# Patient Record
Sex: FEMALE | Race: WHITE | NOT HISPANIC OR LATINO | Employment: OTHER | URBAN - METROPOLITAN AREA
[De-identification: names, ages, dates, MRNs, and addresses within clinical notes are randomized per-mention and may not be internally consistent; named-entity substitution may affect disease eponyms.]

---

## 2019-06-26 ENCOUNTER — TRANSFERRED RECORDS (OUTPATIENT)
Dept: HEALTH INFORMATION MANAGEMENT | Facility: CLINIC | Age: 74
End: 2019-06-26

## 2019-06-26 LAB
CREAT SERPL-MCNC: 0.7 MG/DL (ref 0.6–1.3)
GLUCOSE SERPL-MCNC: 92 MG/DL (ref 70–99)
POTASSIUM SERPL-SCNC: 4.7 MMOL/L (ref 3.5–5.1)

## 2019-07-09 ENCOUNTER — TRANSFERRED RECORDS (OUTPATIENT)
Dept: HEALTH INFORMATION MANAGEMENT | Facility: CLINIC | Age: 74
End: 2019-07-09

## 2019-07-17 ENCOUNTER — HOSPITAL ENCOUNTER (INPATIENT)
Facility: CLINIC | Age: 74
LOS: 1 days | Discharge: HOME HEALTH | DRG: 312 | End: 2019-07-20
Attending: EMERGENCY MEDICINE | Admitting: INTERNAL MEDICINE
Payer: MEDICARE

## 2019-07-17 ENCOUNTER — APPOINTMENT (OUTPATIENT)
Dept: GENERAL RADIOLOGY | Facility: CLINIC | Age: 74
DRG: 312 | End: 2019-07-17
Attending: EMERGENCY MEDICINE
Payer: MEDICARE

## 2019-07-17 DIAGNOSIS — R52 PAIN: ICD-10-CM

## 2019-07-17 DIAGNOSIS — R53.1 GENERALIZED WEAKNESS: Primary | ICD-10-CM

## 2019-07-17 DIAGNOSIS — I95.1 ORTHOSTATIC HYPOTENSION: ICD-10-CM

## 2019-07-17 DIAGNOSIS — R26.81 UNSTEADY GAIT: ICD-10-CM

## 2019-07-17 DIAGNOSIS — R45.87 IMPULSIVE: ICD-10-CM

## 2019-07-17 DIAGNOSIS — M62.81 GENERALIZED MUSCLE WEAKNESS: ICD-10-CM

## 2019-07-17 LAB
ALBUMIN UR-MCNC: NEGATIVE MG/DL
AMORPH CRY #/AREA URNS HPF: ABNORMAL /HPF
ANION GAP SERPL CALCULATED.3IONS-SCNC: 6 MMOL/L (ref 3–14)
APPEARANCE UR: ABNORMAL
BACTERIA #/AREA URNS HPF: ABNORMAL /HPF
BASOPHILS # BLD AUTO: 0.1 10E9/L (ref 0–0.2)
BASOPHILS NFR BLD AUTO: 1.1 %
BILIRUB UR QL STRIP: NEGATIVE
BUN SERPL-MCNC: 17 MG/DL (ref 7–30)
CALCIUM SERPL-MCNC: 9.4 MG/DL (ref 8.5–10.1)
CHLORIDE SERPL-SCNC: 94 MMOL/L (ref 94–109)
CK SERPL-CCNC: 111 U/L (ref 30–225)
CO2 SERPL-SCNC: 27 MMOL/L (ref 20–32)
COLOR UR AUTO: ABNORMAL
CREAT SERPL-MCNC: 0.54 MG/DL (ref 0.52–1.04)
DIFFERENTIAL METHOD BLD: ABNORMAL
EOSINOPHIL # BLD AUTO: 0.2 10E9/L (ref 0–0.7)
EOSINOPHIL NFR BLD AUTO: 1.9 %
ERYTHROCYTE [DISTWIDTH] IN BLOOD BY AUTOMATED COUNT: 12.6 % (ref 10–15)
GFR SERPL CREATININE-BSD FRML MDRD: >90 ML/MIN/{1.73_M2}
GLUCOSE SERPL-MCNC: 111 MG/DL (ref 70–99)
GLUCOSE UR STRIP-MCNC: NEGATIVE MG/DL
HCT VFR BLD AUTO: 35.6 % (ref 35–47)
HGB BLD-MCNC: 12.1 G/DL (ref 11.7–15.7)
HGB UR QL STRIP: NEGATIVE
IMM GRANULOCYTES # BLD: 0.1 10E9/L (ref 0–0.4)
IMM GRANULOCYTES NFR BLD: 0.8 %
KETONES UR STRIP-MCNC: NEGATIVE MG/DL
LEUKOCYTE ESTERASE UR QL STRIP: NEGATIVE
LYMPHOCYTES # BLD AUTO: 2.6 10E9/L (ref 0.8–5.3)
LYMPHOCYTES NFR BLD AUTO: 32.6 %
MCH RBC QN AUTO: 31.7 PG (ref 26.5–33)
MCHC RBC AUTO-ENTMCNC: 34 G/DL (ref 31.5–36.5)
MCV RBC AUTO: 93 FL (ref 78–100)
MONOCYTES # BLD AUTO: 0.9 10E9/L (ref 0–1.3)
MONOCYTES NFR BLD AUTO: 11.5 %
MUCOUS THREADS #/AREA URNS LPF: PRESENT /LPF
NEUTROPHILS # BLD AUTO: 4.1 10E9/L (ref 1.6–8.3)
NEUTROPHILS NFR BLD AUTO: 52.1 %
NITRATE UR QL: NEGATIVE
NRBC # BLD AUTO: 0 10*3/UL
NRBC BLD AUTO-RTO: 0 /100
PH UR STRIP: 7 PH (ref 5–7)
PLATELET # BLD AUTO: 608 10E9/L (ref 150–450)
POTASSIUM SERPL-SCNC: 4.3 MMOL/L (ref 3.4–5.3)
RBC # BLD AUTO: 3.82 10E12/L (ref 3.8–5.2)
RBC #/AREA URNS AUTO: 2 /HPF (ref 0–2)
SODIUM SERPL-SCNC: 127 MMOL/L (ref 133–144)
SOURCE: ABNORMAL
SP GR UR STRIP: 1.01 (ref 1–1.03)
SQUAMOUS #/AREA URNS AUTO: 3 /HPF (ref 0–1)
TROPONIN I SERPL-MCNC: <0.015 UG/L (ref 0–0.04)
TSH SERPL DL<=0.005 MIU/L-ACNC: 1.16 MU/L (ref 0.4–4)
UROBILINOGEN UR STRIP-MCNC: NORMAL MG/DL (ref 0–2)
WBC # BLD AUTO: 7.9 10E9/L (ref 4–11)
WBC #/AREA URNS AUTO: 3 /HPF (ref 0–5)

## 2019-07-17 PROCEDURE — 93005 ELECTROCARDIOGRAM TRACING: CPT

## 2019-07-17 PROCEDURE — 82550 ASSAY OF CK (CPK): CPT | Performed by: EMERGENCY MEDICINE

## 2019-07-17 PROCEDURE — 84484 ASSAY OF TROPONIN QUANT: CPT | Performed by: EMERGENCY MEDICINE

## 2019-07-17 PROCEDURE — 99285 EMERGENCY DEPT VISIT HI MDM: CPT | Mod: 25

## 2019-07-17 PROCEDURE — 84443 ASSAY THYROID STIM HORMONE: CPT | Performed by: EMERGENCY MEDICINE

## 2019-07-17 PROCEDURE — 96361 HYDRATE IV INFUSION ADD-ON: CPT

## 2019-07-17 PROCEDURE — 87086 URINE CULTURE/COLONY COUNT: CPT | Performed by: EMERGENCY MEDICINE

## 2019-07-17 PROCEDURE — 25800030 ZZH RX IP 258 OP 636: Performed by: PHYSICIAN ASSISTANT

## 2019-07-17 PROCEDURE — G0378 HOSPITAL OBSERVATION PER HR: HCPCS

## 2019-07-17 PROCEDURE — 80048 BASIC METABOLIC PNL TOTAL CA: CPT | Performed by: EMERGENCY MEDICINE

## 2019-07-17 PROCEDURE — 99222 1ST HOSP IP/OBS MODERATE 55: CPT | Mod: AI | Performed by: PHYSICIAN ASSISTANT

## 2019-07-17 PROCEDURE — 96360 HYDRATION IV INFUSION INIT: CPT

## 2019-07-17 PROCEDURE — 25000132 ZZH RX MED GY IP 250 OP 250 PS 637: Mod: GY | Performed by: PHYSICIAN ASSISTANT

## 2019-07-17 PROCEDURE — 25000132 ZZH RX MED GY IP 250 OP 250 PS 637: Mod: GY | Performed by: EMERGENCY MEDICINE

## 2019-07-17 PROCEDURE — 85025 COMPLETE CBC W/AUTO DIFF WBC: CPT | Performed by: EMERGENCY MEDICINE

## 2019-07-17 PROCEDURE — 71046 X-RAY EXAM CHEST 2 VIEWS: CPT

## 2019-07-17 PROCEDURE — 81001 URINALYSIS AUTO W/SCOPE: CPT | Performed by: EMERGENCY MEDICINE

## 2019-07-17 RX ORDER — TRAMADOL HYDROCHLORIDE 50 MG/1
50 TABLET ORAL EVERY 6 HOURS PRN
COMMUNITY
End: 2019-07-17

## 2019-07-17 RX ORDER — DOXYCYCLINE HYCLATE 100 MG/1
TABLET, DELAYED RELEASE ORAL
COMMUNITY
End: 2019-07-17

## 2019-07-17 RX ORDER — POLYETHYLENE GLYCOL 3350 17 G/17G
17 POWDER, FOR SOLUTION ORAL DAILY PRN
Status: DISCONTINUED | OUTPATIENT
Start: 2019-07-17 | End: 2019-07-17

## 2019-07-17 RX ORDER — AMOXICILLIN 250 MG
2 CAPSULE ORAL 2 TIMES DAILY PRN
Status: DISCONTINUED | OUTPATIENT
Start: 2019-07-17 | End: 2019-07-20 | Stop reason: HOSPADM

## 2019-07-17 RX ORDER — ACETAMINOPHEN 325 MG/1
975 TABLET ORAL ONCE
Status: COMPLETED | OUTPATIENT
Start: 2019-07-17 | End: 2019-07-17

## 2019-07-17 RX ORDER — ACETAMINOPHEN 325 MG/1
650 TABLET ORAL EVERY 4 HOURS PRN
Status: DISCONTINUED | OUTPATIENT
Start: 2019-07-17 | End: 2019-07-20 | Stop reason: HOSPADM

## 2019-07-17 RX ORDER — POLYETHYLENE GLYCOL 3350 17 G/17G
17 POWDER, FOR SOLUTION ORAL DAILY
Status: DISCONTINUED | OUTPATIENT
Start: 2019-07-17 | End: 2019-07-20 | Stop reason: HOSPADM

## 2019-07-17 RX ORDER — ONDANSETRON 4 MG/1
4 TABLET, ORALLY DISINTEGRATING ORAL EVERY 6 HOURS PRN
Status: DISCONTINUED | OUTPATIENT
Start: 2019-07-17 | End: 2019-07-20 | Stop reason: HOSPADM

## 2019-07-17 RX ORDER — DOXYCYCLINE 100 MG/1
100 CAPSULE ORAL 2 TIMES DAILY
Status: DISCONTINUED | OUTPATIENT
Start: 2019-07-17 | End: 2019-07-19

## 2019-07-17 RX ORDER — NALOXONE HYDROCHLORIDE 0.4 MG/ML
.1-.4 INJECTION, SOLUTION INTRAMUSCULAR; INTRAVENOUS; SUBCUTANEOUS
Status: DISCONTINUED | OUTPATIENT
Start: 2019-07-17 | End: 2019-07-20 | Stop reason: HOSPADM

## 2019-07-17 RX ORDER — SODIUM CHLORIDE 9 MG/ML
INJECTION, SOLUTION INTRAVENOUS CONTINUOUS
Status: ACTIVE | OUTPATIENT
Start: 2019-07-17 | End: 2019-07-17

## 2019-07-17 RX ORDER — AMOXICILLIN 250 MG
1 CAPSULE ORAL 2 TIMES DAILY PRN
Status: DISCONTINUED | OUTPATIENT
Start: 2019-07-17 | End: 2019-07-20 | Stop reason: HOSPADM

## 2019-07-17 RX ORDER — ONDANSETRON 2 MG/ML
4 INJECTION INTRAMUSCULAR; INTRAVENOUS EVERY 6 HOURS PRN
Status: DISCONTINUED | OUTPATIENT
Start: 2019-07-17 | End: 2019-07-20 | Stop reason: HOSPADM

## 2019-07-17 RX ORDER — LOSARTAN POTASSIUM 25 MG/1
12.5-25 TABLET ORAL DAILY PRN
Status: ON HOLD | COMMUNITY
End: 2019-07-20

## 2019-07-17 RX ORDER — DOXYCYCLINE 100 MG/1
100 CAPSULE ORAL 2 TIMES DAILY
Status: ON HOLD | COMMUNITY
Start: 2019-07-10 | End: 2019-07-20

## 2019-07-17 RX ORDER — LIDOCAINE 40 MG/G
CREAM TOPICAL
Status: DISCONTINUED | OUTPATIENT
Start: 2019-07-17 | End: 2019-07-20 | Stop reason: HOSPADM

## 2019-07-17 RX ADMIN — DOXYCYCLINE HYCLATE 100 MG: 100 CAPSULE ORAL at 11:20

## 2019-07-17 RX ADMIN — SODIUM CHLORIDE: 9 INJECTION, SOLUTION INTRAVENOUS at 11:12

## 2019-07-17 RX ADMIN — DOXYCYCLINE HYCLATE 100 MG: 100 CAPSULE ORAL at 20:09

## 2019-07-17 RX ADMIN — ACETAMINOPHEN 975 MG: 325 TABLET, FILM COATED ORAL at 07:59

## 2019-07-17 RX ADMIN — POLYETHYLENE GLYCOL 3350 17 G: 17 POWDER, FOR SOLUTION ORAL at 11:20

## 2019-07-17 RX ADMIN — ACETAMINOPHEN 650 MG: 325 TABLET, FILM COATED ORAL at 16:16

## 2019-07-17 ASSESSMENT — ENCOUNTER SYMPTOMS
BACK PAIN: 1
FEVER: 0
SLEEP DISTURBANCE: 1
DIZZINESS: 1
COUGH: 1
ARTHRALGIAS: 1
VOMITING: 0
ACTIVITY CHANGE: 1
SHORTNESS OF BREATH: 0
WEAKNESS: 1
DIARRHEA: 0
APPETITE CHANGE: 1

## 2019-07-17 ASSESSMENT — MIFFLIN-ST. JEOR: SCORE: 984.85

## 2019-07-17 NOTE — H&P
[facility] Outpatient / Observation Unit  History and Physical Exam     David Hogan MRN# [id number]   YOB: 1945 Age: 74 year old      Date of Admission:  7/17/2019    Primary care provider: Jessica Thompson          Assessment:   David Hogan is a 74 year old female with a PMH significant for HTN and chronic back pain due to lumbar stenosis and recent hx of influenza and subsequent pneumonia, still on doxycycline, who presents with generalized weakness.  Work up in the ED reveals: VSS. BMP and CBC are fairly unremarkable other than Na of 127. Troponin is undetectable, CK is within normal limits and TSH is 1.16. UA is unremarkable. CXR shows markings in left lung base and anterior inferior lungs consistent with middle lobe infiltrate. She received 975 mg PO Tylenol in the ED.   Patient will be registered to Observation for further evaluation and symptom management.     1. Generalized weakness - likely multifactorial, but most likely deconditioning from recent influenza and pneumonia illness and mild dehydration from poor PO intake due to previous illness. Also has lumbar stenosis and is supposed to have surgery, which has been delayed due to recent illness. Continues on PO abx. IVFs, encourage PO intake and PT consult. May need home care services vs TCU  2. Abdominal discomfort - seems to be more of a chronic issue. Does not recent constipation, will start daily Miralax. Discussed trial of elimination diet with pt. Has not had a colonoscopy in her lifetime.  3. HTN - resume home losartan with parameters    Will request records from [facility]         Plan:     1. [place] to Observation  2. Continue home abx  3. IV hydration with Normal saline, @, 100 ml/hr for 10 hrs  4. Supportive care with anti-emetics, pain meds PRN  5. Regular diet as tolerated  6. Follow labs  7. PT consult  8. DVT prophylaxis: pt at low risk, encourage ambulation.    9. SW consult                Chief Complaint:   Generalized  weakness         History of Present Illness:   David Hogan is a 74 year old female with a PMH significant for HTN and chronic back pain due to lumbar stenosis and recent hx of influenza and subsequent pneumonia, still on doxycycline, who presents with generalized weakness. The  and pt were diagnosed with influenza ~3 weeks ago. The patient subsequently developed pneumonia and was started on PO Doxycycline. She was seen at [facility]. Over the past 3 weeks, she has gotten progressively weaker. She states her cough is improving but has had a poor appetite and poor PO intake. She has been fairly immobile due to fatigue. She also has chronic back pain due to spinal stenosis and was scheduled for surgery but this was postponed due to acute illness. She also notes dizziness with sitting or standing up. She denies fever, chills, chest pain, SOB, nausea, vomiting, diarrhea or dysuria. She does note intermittent mild diffuse abd pain that has been present for quite some time. She also notes recent constipation, had a small BM 2 days ago after going 4 days without a BM. She has never had a colonoscopy.           Past Medical History:     Past Medical History:   Diagnosis Date     Hypertension                Past Surgical History:   History reviewed. No pertinent surgical history.            Social History:     Social History     Socioeconomic History     Marital status:      Spouse name: Not on file     Number of children: Not on file     Years of education: Not on file     Highest education level: Not on file   Occupational History     Not on file   Social Needs     Financial resource strain: Not on file     Food insecurity:     Worry: Not on file     Inability: Not on file     Transportation needs:     Medical: Not on file     Non-medical: Not on file   Tobacco Use     Smoking status: Current Every Day Smoker     Packs/day: 0.25   Substance and Sexual Activity     Alcohol use: Not Currently     Drug use: Not  on file     Sexual activity: Not on file   Lifestyle     Physical activity:     Days per week: Not on file     Minutes per session: Not on file     Stress: Not on file   Relationships     Social connections:     Talks on phone: Not on file     Gets together: Not on file     Attends Restorationist service: Not on file     Active member of club or organization: Not on file     Attends meetings of clubs or organizations: Not on file     Relationship status: Not on file     Intimate partner violence:     Fear of current or ex partner: Not on file     Emotionally abused: Not on file     Physically abused: Not on file     Forced sexual activity: Not on file   Other Topics Concern     Not on file   Social History Narrative     Not on file               Family History:   Reviewed and non contributory         Allergies:      Allergies   Allergen Reactions     Amoxicillin      Hydrochlorothiazide W/Triamterene Other (See Comments)     Hyponatremia     Sertraline Other (See Comments)     Diarrhea               Medications:     Prior to Admission medications    Medication Sig Last Dose Taking? Auth Provider   doxycycline hyclate (VIBRAMYCIN) 100 MG capsule Take 100 mg by mouth 2 times daily 7/16/2019 at has 5 doses remaining Yes Unknown, Entered By History   losartan (COZAAR) 25 MG tablet Take 12.5-25 mg by mouth daily as needed (high BP (measures at home))  Past Month at Unknown time Yes Reported, Patient              Review of Systems:   A Comprehensive greater than 10 system review of systems was carried out.  Pertinent positives and negatives are noted above.  Otherwise negative for contributory information.     Constitutional, neuro, ENT, endocrine, pulmonary, cardiac, gastrointestinal, genitourinary, musculoskeletal, integument and psychiatric systems are negative, except as otherwise noted.         Physical Exam:   Blood pressure (!) 145/91, pulse 81, temperature 97.8  F (36.6  C), temperature source Oral, resp. rate 14,  "height 1.626 m (5' 4\"), weight 50.5 kg (111 lb 4.8 oz), SpO2 97 %.    GENERAL:  Comfortable.  PSYCH: pleasant, oriented, No acute distress.  HEENT:  Atraumatic, normocephalic. Normal conjunctiva, normal hearing, and oropharynx is normal.  NECK:  Supple, no neck vein distention  HEART:  Normal S1, S2 with no murmur, no pericardial rub, gallops or S3 or S4.  LUNGS:  Clear to auscultation, normal Respiratory effort. No wheezing, rales or ronchi.  GI:  Soft, normal bowel sounds. Mild diffuse tenderness, non distended.   EXTREMITIES:  No pedal edema, +2 pulses bilateral and equal.  SKIN:  Dry to touch, No rash, wound or ulcerations.  NEUROLOGIC:  CN 2-12 intact, BL 5/5 symmetric upper and lower extremity strength, sensation is intact with no focal deficits.              Data:     EKG demonstrates:  Normal Sinus Rhythm.    Recent Labs   Lab 07/17/19  0617   WBC 7.9   HGB 12.1   HCT 35.6   MCV 93   *     Recent Labs   Lab 07/17/19  0617   *   POTASSIUM 4.3   CHLORIDE 94   CO2 27   ANIONGAP 6   *   BUN 17   CR 0.54   GFRESTIMATED >90   GFRESTBLACK >90   ANN-MARIE 9.4     Recent Labs   Lab 07/17/19  0617        Recent Labs   Lab 07/17/19  0617   TSH 1.16     Recent Labs   Lab 07/17/19  0617   TROPI <0.015     Recent Labs   Lab 07/17/19  0700   COLOR Light Yellow   APPEARANCE Slightly Cloudy   URINEGLC Negative   URINEBILI Negative   URINEKETONE Negative   SG 1.012   UBLD Negative   URINEPH 7.0   PROTEIN Negative   NITRITE Negative   LEUKEST Negative   RBCU 2   WBCU 3         Recent Results (from the past 48 hour(s))   XR Chest 2 Views    Narrative    CHEST TWO VIEWS  7/17/2019 7:10 AM     HISTORY: Cough, right lower lobe wheezing    COMPARISON: None.      Impression    IMPRESSION: Hyperinflated lungs consistent with emphysematous changes.  Mild apical scarring. Minimal increased markings at the left lung base  and anterior inferior lungs on lateral film consistent with mild  lingular or middle lobe " infiltrate. Follow-up to ensure complete  resolution. No evidence for pleural effusion. Heart and pulmonary  vessels within normal limits.    MD Whitney BARRIENTOS PA-C

## 2019-07-17 NOTE — CONSULTS
RN CTS acknowledges consult.  SW consulted for discharge assessment and planning.     CM available for any further discharge planning or needs.    Mary Ann Nava MA-CAL  Care Transition Services  [phone]

## 2019-07-17 NOTE — ED NOTES
C/o increasing weakness over last few weeks, assisted to bed by 2, also c/o pain RUE with no know injury

## 2019-07-17 NOTE — PROGRESS NOTES
ROOM #221    Living Situation (if not independent, order SW consult): Lives with , single family home  Facility name:  : [facility]    Activity level at baseline: Assist  Of 1  Activity level on admit: assist 1-2, gait belt      Patient registered to observation; given Patient Bill of Rights; given the opportunity to ask questions about observation status and their plan of care.  Patient has been oriented to the observation room, bathroom and call light is in place.    Discussed discharge goals and expectations with patient/family.

## 2019-07-17 NOTE — ED TRIAGE NOTES
Here for generalized weakness since 2 weeks with dizziness. Recently diagnose with pneumonia and is currently on antibiotics. ABCs intact.

## 2019-07-17 NOTE — PLAN OF CARE
PRIMARY DIAGNOSIS: GENERALIZED WEAKNESS    OUTPATIENT/OBSERVATION GOALS TO BE MET BEFORE DISCHARGE  1. Orthostatic performed: No    2. Tolerating PO medications: Yes    3. Return to near baseline physical activity: No    4. Cleared for discharge by consultants (if involved): N/A    Discharge Planner Nurse   Safe discharge environment identified: Yes  Barriers to discharge: Yes       Entered by: Era Cooper 07/17/2019 11:31 AM    Arrived from ED 1015. A & Ox4, forgetful and impulsive.  Restless, frequently moving from bed to chair, readjusting in bed.  Bed alarm on. Instructed to call for assist with all activity.  Up with assist 1, unsteady.  Reports generalized weakness.    at bedside at present.  Reports chronic low back pain rated 6/10.  Tylenol given in ED. Offered ice pack, declined at present, declines heating pad. Na 127.  NS at 100cc/hr infusing.  Voiding without difficulty.  Continues on Doxycycline for previously diagnosed pneumonia.  Miralax daily, last BM 7/15.  Plan for IV fluids, pain management, PT consult.  Will continue to monitor.     Please review provider order for any additional goals.   Nurse to notify provider when observation goals have been met and patient is ready for discharge.

## 2019-07-17 NOTE — ED NOTES
[facility]  ED Nurse Handoff Report    David Hogan is a 74 year old female   ED Chief complaint: Generalized Weakness  . ED Diagnosis:   Final diagnoses:   Generalized muscle weakness   Pain   Unsteady gait   Impulsive     Allergies:   Allergies   Allergen Reactions     Amoxicillin      Hydrochlorothiazide W/Triamterene Other (See Comments)     Hyponatremia     Sertraline Other (See Comments)     Diarrhea       Code Status: Full Code  Activity level - Baseline/Home:  Assist X 1. Activity Level - Current:   Assist X 1. Lift room needed: No. Bariatric: No   Needed: No   Isolation: No. Infection: Not Applicable.     Vital Signs:   Vitals:    07/17/19 0730 07/17/19 0745 07/17/19 0800 07/17/19 0815   BP: 122/59 167/85 112/63 (!) 126/91   Pulse: 87  82 93   Resp: 22 19 9 20   Temp:       TempSrc:       SpO2: 96%          Cardiac Rhythm:  ,      Pain level:    Patient confused: No. Patient Falls Risk: Yes.   Elimination Status: Has voided   Patient Report - Initial Complaint: weakness. Focused Assessment:weakness s/p pneumonia   Tests Performed:   Labs Ordered and Resulted from Time of ED Arrival Up to the Time of Departure from the ED   CBC WITH PLATELETS DIFFERENTIAL - Abnormal; Notable for the following components:       Result Value    Platelet Count 608 (*)     All other components within normal limits   BASIC METABOLIC PANEL - Abnormal; Notable for the following components:    Sodium 127 (*)     Glucose 111 (*)     All other components within normal limits   ROUTINE UA WITH MICROSCOPIC - Abnormal; Notable for the following components:    Bacteria Urine Few (*)     Squamous Epithelial /HPF Urine 3 (*)     Mucous Urine Present (*)     Amorphous Crystals Few (*)     All other components within normal limits   TSH WITH FREE T4 REFLEX   TROPONIN I   CK TOTAL   URINE CULTURE AEROBIC BACTERIAL   . Abnormal Results:see above   Treatments provided: labs , iv , ambulation  Family Comments:   at bedside  OBS brochure/video discussed/provided to patient:  Yes  ED Medications:   Medications   acetaminophen (TYLENOL) tablet 975 mg (975 mg Oral Given 7/17/19 9080)     Drips infusing:  No  For the majority of the shift, the patient's behavior Green. Interventions performed were patient has become increasing weak and uncomfortable , restless legs , c/o back pain and leg pain,  having increasing difficulty handling her .recently on antibiotics for pneumonia .     Severe Sepsis OR Septic Shock Diagnosis Present: No      ED Nurse Name/Phone Number: Aylin Real,   8:55 AM    RECEIVING UNIT ED HANDOFF REVIEW    Above ED Nurse Handoff Report was reviewed: Yes  Reviewed by: Era Cooper on July 17, 2019 at 9:31 AM

## 2019-07-17 NOTE — PHARMACY-ADMISSION MEDICATION HISTORY
Admission medication history interview status for this patient is complete. See [facility] admission navigator for allergy information, prior to admission medications and immunization status.     Medication history interview source(s):Patient  Medication history resources (including written lists, pill bottles, clinic record):None  Primary pharmacy: [facility]/pharmacy #[id number] - [place], [place] - [address]    Changes made to PTA medication list:  Added: none  Deleted: tramadol  Changed: added dose/sig to doxycycline, losartan changed to PRN    Actions taken by pharmacist (provider contacted, etc):None   Additional medication history information: has 5 doses remaining of antibiotic prescription  Medication reconciliation/reorder completed by provider prior to medication history? No        Prior to Admission medications    Medication Sig Last Dose Taking? Auth Provider   doxycycline hyclate (VIBRAMYCIN) 100 MG capsule Take 100 mg by mouth 2 times daily 7/16/2019 at has 5 doses remaining Yes Unknown, Entered By History   losartan (COZAAR) 25 MG tablet Take 12.5-25 mg by mouth daily as needed (high BP (measures at home))  Past Month at Unknown time Yes Reported, Patient

## 2019-07-17 NOTE — ED PROVIDER NOTES
"  History     Chief Complaint:  Generalized Weakness      HPI   David Hogan is a 74 year old female with a history of hypertension, smoking (about one cigarette daily currently), adenoma of left adrenal gland, lumbar spondylosis and recent pneumonia on doxycycline who presents with generalized weakness.  states that he was diagnosed with Influenza A about 3 weeks ago, and the patient was subsequently diagnosed with this. Then, she was diagnosed with pneumonia, and has been on a course of doxycycline for this.  Patient became increasingly weak over the last 2-3 weeks with these illnesses, alongside her cough, decreased appetite, decreased activity level, and fatigue.  describes that she has had to crawl to get to the bed, but he is not sure if this is related to her ongoing pain or her illness; in addition to the influenza and pneumonia, patient has ongoing pain from spinal stenosis and sciatica. She is supposed to have surgery for this soon, and has had ongoing decreased mobility. She had a pre-op scheduled for the surgery, but because of these two illnesses and her worsening symptoms, they have not gone forward with this. Alongside her weakness, patient has been more dizzy about \"50%\" of the day, and has been unable to sleep well at night. Patient and  made an appointment at [facility] today, but decided to present to the emergency department because of the severity of her symptoms. Denies fever, chest pain, shortness of breath, diarrhea, and vomiting.     Allergies:  Amoxicillin  Hydrochlorothiazide W/Triamterene  Sertraline      Medications:     Cozaar   Doxycycline   Tramadol     Past Medical History:    Hypertension   Adenoma of left adrenal gland   Lumbar spondylosis   Smoking   Sciatica, left side  Myofascial pain   Age related osteoporosis     Past Surgical History:    History reviewed. No pertinent past surgical history.    Family History:    History reviewed. No pertinent family " history.      Social History:  The patient was accompanied to the ED by her .  Smoking Status: Current every day smoker  Smokeless Tobacco: Never  Alcohol Use: yes   Marital Status:       Review of Systems   Constitutional: Positive for activity change and appetite change. Negative for fever.   Respiratory: Positive for cough. Negative for shortness of breath.    Cardiovascular: Negative for chest pain.   Gastrointestinal: Negative for diarrhea and vomiting.   Musculoskeletal: Positive for arthralgias and back pain.   Neurological: Positive for dizziness and weakness.   Psychiatric/Behavioral: Positive for sleep disturbance.   All other systems reviewed and are negative.      Physical Exam     Patient Vitals for the past 24 hrs:   BP Temp Temp src Pulse Heart Rate Resp SpO2   07/17/19 0525 134/80 97.8  F (36.6  C) Oral 93 93 18 94 %      Physical Exam  General: Resting on the bed.  Head: No obvious trauma to head.  Ears, Nose, Throat:  External ears normal.  Nose normal.  No pharyngeal erythema, swelling or exudate.  Midline uvula.    Eyes:  Conjunctivae clear.  Pupils are equal, round, and reactive.   Neck: Normal range of motion.  Neck supple.   CV: Regular rate and rhythm.      Respiratory: Effort normal and breath sounds normal.  coarse with wheezing in the LLL   Gastrointestinal: Soft.  No distension. There is no tenderness.    Musculoskeletal:Non tender non edematous calves  Neuro: Alert. Moving all extremities appropriately.  Normal speech.  CN II-XII grossly intact,  visual fields intact.  Gross muscle strength intact of the proximal and distal bilateral upper and lower extremities.  Sensation intact to light touch in all 4 extremities.    Skin: Skin is warm and dry.  No rash noted.       Emergency Department Course     ECG:  Indication: Generalized weakness  Completed at 0534.  Read at 0605.   Normal sinus rhythm   Normal ECG   Rate 86 bpm. WV interval 114. QRS duration 92. QT/QTc 364/435.  P-R-T axes -7 70 75.    Imaging:  Radiology findings were communicated with the patient, family and Admitting MD who voiced understanding of the findings.    XR Chest 2 views  IMPRESSION: Hyperinflated lungs consistent with emphysematous changes.  Mild apical scarring. Minimal increased markings at the left lung base  and anterior inferior lungs on lateral film consistent with mild  lingular or middle lobe infiltrate. Follow-up to ensure complete  resolution. No evidence for pleural effusion. Heart and pulmonary  vessels within normal limits.  Report per radiology     Laboratory:  Laboratory findings were communicated with the patient, family and Admitting MD who voiced understanding of the findings.    CBC:  (H) o/w WNL. (WBC 7.9, HGB 12.1)   BMP: Sodium 127 (L), Glucose 111 (H) o/w WNL (Creatinine 0.54)    TSH with free T4 reflex: 1.16  Troponin (Collected 0617): <0.015    UA: Light yellow, slightly cloudy urine. Bacteria urine few, squamous epithelial/HPF urine 3 (H), mucous urine present, amorphous crystals few o/w WNL   Urine Culture Aerobic Bacterial: Pending    CK total: 111     Interventions:  0759 - Tylenol 975mg PO      Emergency Department Course:  Nursing notes and vitals reviewed.  EKG obtained in the ED, see results above.   IV was inserted and blood was drawn for laboratory testing, results above.  The patient provided a urine sample here in the emergency department. This was sent for laboratory testing, findings above.  The patient was sent for a CXR while in the emergency department, results above.     0612: I performed an exam of the patient as documented above.    0757: Patient rechecked and updated.      0841: I spoke with the Feliciano BURNHAM, on for Dr. Monahan of the Hospitalist service regarding patient's presentation, findings, and plan of care.    Findings and plan explained to the Patient and spouse who consents to admission. Discussed the patient with the Feliciano BURNHAM, for Dr. Monahan, who will  admit the patient to a Observation bed for further monitoring, evaluation, and treatment.     I personally reviewed the laboratory and imavging results with the Patient and spouse and answered all related questions prior to admission.     Impression & Plan      Medical Decision Making:  David Hogan is a 74 year old female who presents for evaluation of weakness.  Associated symptoms today have included fatigue, decreased appetite and dizziness. The workup here in the emergency room was to evaluate for infections, metabolic, electrolyte, neurologic or cardiovascular causes.  There are no signs of worsening pneumonia or new UTI causing the symptoms. She is undergoing treatment for pneumonia with a course of doxycycline, and CXR today is negative for new or worsening infiltrate.  In addition, I do not believe symptoms are due to CVA, TIA, myasthesia gravis, myopathy or acute coronary syndromes and there are no indications at this point for a further workup with a benign history, exam and laboratory tests.  EKG showed sinus rhythm no acute ST-T wave changes.  No evidence of ischemia.  Patient denies any chest pain or shortness of breath.  Troponin is negative.  TSH is normal not concerning for hyper or hypothyroidism.  CBC shows no leukocytosis or anemia.  BMP shows no acute electrolyte metabolic or renal dysfunction.  Mild hyponatremia likely secondary to poor p.o. intake and possible mild dehydration.  CK is normal not consistent with myositis.  Patient's neurologic symptoms are nonfocal, just generalized weakness, do not suspect CVA.  Patient and  do note chronic back pain associated with her lumbar spondylosis and sciatica, and this could certainly be contributing to her overall malaise.  Patient does not have any acute red flags related to her lumbar stenosis such as incontinence, groin paresthesias, new weakness in the legs, no numbness or tingling that is one-sided.  Do not feel any emergent MRI is  required of her back at this time.    The patient remains persistently weak and is a fall risk.  She was not able to ambulate well in the emergency department due to weakness and pain. I spoke with the hospitalist Dr. Monahan who graciously agreed to admit the patient for further evaluation and treatment.     Diagnosis:    ICD-10-CM   1. Generalized muscle weakness M62.81   2. Pain R52   3. Unsteady gait R26.81   4. Impulsive R45.87       Disposition:  Admitted to Observation      Scribe Disclosure:  I, Malia Murrell, am serving as a scribe at 6:12 AM on 7/17/2019 to document services personally performed by Yaz Feliz MD based on my observations and the provider's statements to me.   7/17/2019   [facility] EMERGENCY DEPARTMENT       Yaz Feliz MD  07/17/19 1942

## 2019-07-17 NOTE — PLAN OF CARE
"PRIMARY DIAGNOSIS: GENERALIZED WEAKNESS     OUTPATIENT/OBSERVATION GOALS TO BE MET BEFORE DISCHARGE  1. Orthostatic performed: No     2. Tolerating PO medications: Yes     3. Return to near baseline physical activity: Yes     4. Cleared for discharge by consultants (if involved): N/A     Discharge Planner Nurse   Safe discharge environment identified: Yes  Barriers to discharge: Yes         A&Ox4, able to answer orientation questions, forgetful. Continues with Low back pain/chronic. Tylenol prn and repositioning.  Up to void with assist 1/gait belt. Unsteady.  IV fluids as ordered.  Tolerating diet, good appetite.  Had a coughing spell, stated \"its hard to breath\", O2 sats upper 90's on RA.  Stated she has had these \"coughing fits\" since diagnosed with pneumonia.  LS clear/diminished.  Bed alarm on for safety.  Will continue to monitor.  Please review provider order for any additional goals.   Nurse to notify provider when observation goals have been met and patient is ready for discharge.        "

## 2019-07-18 ENCOUNTER — APPOINTMENT (OUTPATIENT)
Dept: PHYSICAL THERAPY | Facility: CLINIC | Age: 74
DRG: 312 | End: 2019-07-18
Attending: PHYSICIAN ASSISTANT
Payer: MEDICARE

## 2019-07-18 LAB
ANION GAP SERPL CALCULATED.3IONS-SCNC: 5 MMOL/L (ref 3–14)
BACTERIA SPEC CULT: NORMAL
BUN SERPL-MCNC: 9 MG/DL (ref 7–30)
CALCIUM SERPL-MCNC: 9.1 MG/DL (ref 8.5–10.1)
CHLORIDE SERPL-SCNC: 98 MMOL/L (ref 94–109)
CO2 SERPL-SCNC: 27 MMOL/L (ref 20–32)
CREAT SERPL-MCNC: 0.44 MG/DL (ref 0.52–1.04)
ERYTHROCYTE [DISTWIDTH] IN BLOOD BY AUTOMATED COUNT: 12.7 % (ref 10–15)
GFR SERPL CREATININE-BSD FRML MDRD: >90 ML/MIN/{1.73_M2}
GLUCOSE SERPL-MCNC: 98 MG/DL (ref 70–99)
HCT VFR BLD AUTO: 35.3 % (ref 35–47)
HGB BLD-MCNC: 11.8 G/DL (ref 11.7–15.7)
INTERPRETATION ECG - MUSE: NORMAL
Lab: NORMAL
MCH RBC QN AUTO: 31.5 PG (ref 26.5–33)
MCHC RBC AUTO-ENTMCNC: 33.4 G/DL (ref 31.5–36.5)
MCV RBC AUTO: 94 FL (ref 78–100)
PLATELET # BLD AUTO: 584 10E9/L (ref 150–450)
POTASSIUM SERPL-SCNC: 3.6 MMOL/L (ref 3.4–5.3)
RBC # BLD AUTO: 3.75 10E12/L (ref 3.8–5.2)
SODIUM SERPL-SCNC: 130 MMOL/L (ref 133–144)
SPECIMEN SOURCE: NORMAL
WBC # BLD AUTO: 6.5 10E9/L (ref 4–11)

## 2019-07-18 PROCEDURE — 25000128 H RX IP 250 OP 636: Performed by: PHYSICIAN ASSISTANT

## 2019-07-18 PROCEDURE — 99207 ZZC CDG-CODE CATEGORY CHANGED: CPT | Performed by: PHYSICIAN ASSISTANT

## 2019-07-18 PROCEDURE — 36415 COLL VENOUS BLD VENIPUNCTURE: CPT | Performed by: PHYSICIAN ASSISTANT

## 2019-07-18 PROCEDURE — 99232 SBSQ HOSP IP/OBS MODERATE 35: CPT | Performed by: PHYSICIAN ASSISTANT

## 2019-07-18 PROCEDURE — G0378 HOSPITAL OBSERVATION PER HR: HCPCS

## 2019-07-18 PROCEDURE — 80048 BASIC METABOLIC PNL TOTAL CA: CPT | Performed by: PHYSICIAN ASSISTANT

## 2019-07-18 PROCEDURE — 25000132 ZZH RX MED GY IP 250 OP 250 PS 637: Mod: GY | Performed by: PHYSICIAN ASSISTANT

## 2019-07-18 PROCEDURE — 85027 COMPLETE CBC AUTOMATED: CPT | Performed by: PHYSICIAN ASSISTANT

## 2019-07-18 PROCEDURE — 97161 PT EVAL LOW COMPLEX 20 MIN: CPT | Mod: GP | Performed by: PHYSICAL THERAPIST

## 2019-07-18 PROCEDURE — 25800030 ZZH RX IP 258 OP 636: Performed by: PHYSICIAN ASSISTANT

## 2019-07-18 PROCEDURE — 96361 HYDRATE IV INFUSION ADD-ON: CPT

## 2019-07-18 RX ORDER — SODIUM CHLORIDE 9 MG/ML
INJECTION, SOLUTION INTRAVENOUS CONTINUOUS
Status: DISCONTINUED | OUTPATIENT
Start: 2019-07-18 | End: 2019-07-19

## 2019-07-18 RX ORDER — SODIUM CHLORIDE AND POTASSIUM CHLORIDE 150; 900 MG/100ML; MG/100ML
INJECTION, SOLUTION INTRAVENOUS CONTINUOUS
Status: DISCONTINUED | OUTPATIENT
Start: 2019-07-18 | End: 2019-07-20

## 2019-07-18 RX ORDER — ALENDRONATE SODIUM 70 MG/1
70 TABLET ORAL WEEKLY
Refills: 1 | Status: ON HOLD | COMMUNITY
Start: 2019-05-08 | End: 2020-10-10

## 2019-07-18 RX ORDER — ALENDRONATE SODIUM 70 MG/1
70 TABLET ORAL WEEKLY
Status: DISCONTINUED | OUTPATIENT
Start: 2019-07-19 | End: 2019-07-19

## 2019-07-18 RX ORDER — SODIUM CHLORIDE 9 MG/ML
INJECTION, SOLUTION INTRAVENOUS CONTINUOUS
Status: DISCONTINUED | OUTPATIENT
Start: 2019-07-18 | End: 2019-07-18

## 2019-07-18 RX ADMIN — DOXYCYCLINE HYCLATE 100 MG: 100 CAPSULE ORAL at 19:01

## 2019-07-18 RX ADMIN — ACETAMINOPHEN 650 MG: 325 TABLET, FILM COATED ORAL at 18:56

## 2019-07-18 RX ADMIN — DOXYCYCLINE HYCLATE 100 MG: 100 CAPSULE ORAL at 08:44

## 2019-07-18 RX ADMIN — POTASSIUM CHLORIDE AND SODIUM CHLORIDE: 900; 150 INJECTION, SOLUTION INTRAVENOUS at 18:53

## 2019-07-18 RX ADMIN — SODIUM CHLORIDE: 9 INJECTION, SOLUTION INTRAVENOUS at 08:43

## 2019-07-18 RX ADMIN — POLYETHYLENE GLYCOL 3350 17 G: 17 POWDER, FOR SOLUTION ORAL at 08:44

## 2019-07-18 RX ADMIN — ACETAMINOPHEN 650 MG: 325 TABLET, FILM COATED ORAL at 08:49

## 2019-07-18 NOTE — PLAN OF CARE
PRIMARY DIAGNOSIS: GENERALIZED WEAKNESS    OUTPATIENT/OBSERVATION GOALS TO BE MET BEFORE DISCHARGE  1. Orthostatic performed: N/A    2. Tolerating PO medications: Yes    3. Return to near baseline physical activity: Yes    4. Cleared for discharge by consultants (if involved): N/A    Discharge Planner Nurse   Safe discharge environment identified: Yes  Barriers to discharge: Yes- NA level, PT in AM.       Entered by: Mike Swanson 07/17/2019 9:23 PM     Please review provider order for any additional goals.   Nurse to notify provider when observation goals have been met and patient is ready for discharge.

## 2019-07-18 NOTE — PLAN OF CARE
PRIMARY DIAGNOSIS: GENERALIZED WEAKNESS    OUTPATIENT/OBSERVATION GOALS TO BE MET BEFORE DISCHARGE  1. Orthostatic performed: Yes:          Lying Orthostatic BP: 160/79         Sitting Orthostatic BP: 106/60         Standing Orthostatic BP: 71/52     2. Tolerating PO medications: Yes    3. Return to near baseline physical activity: Yes    4. Cleared for discharge by consultants (if involved): N/A    Discharge Planner Nurse   Safe discharge environment identified: Yes  Barriers to discharge: No       Entered by: Na Hernandez 07/18/2019 4:11 PM     Please review provider order for any additional goals.   Nurse to notify provider when observation goals have been met and patient is ready for discharge.      VSS. A and Ox4, but impulsive and forgetful. Pt. Resting in bed. Per pt. , he had disarmed bed alarm, bed alarm is now on and audible. Will continue to monitor.

## 2019-07-18 NOTE — CONSULTS
Care Transition Initial Assessment - SW     Met with: Patient and spouse  Active Problems:    Generalized weakness       DATA  Lives With: spouse      Quality of Family Relationships: helpful, involved  Description of Support System: Supportive, Involved  Who is your support system?:   Support Assessment: Adequate family and caregiver support, Adequate social supports.   Identified issues/concerns regarding health management: Pt resides at home with her . Admitted under observation for generalized weakness. At baseline, she ambulates independently and is fairly independent with ADLs and IADLs.  assists as needed. Pt currently going to outpatient PT at [facility]. PT evaluated pt and recommended HH PT. Pt agreeable, and has elected [facility]. Referral sent via email. Pt to be sent home with a walker.     Quality of Family Relationships: helpful, involved     ASSESSMENT  Cognitive Status:  awake, alert and oriented, some confusion  Concerns to be addressed: Discharge planning. Referral sent to [facility] via email.      PLAN  Financial costs for the patient includes: None anticipated.  Patient given options and choices for discharge: Yes.  Patient/family is agreeable to the plan?  Yes: Agreeable to HC.  Transportation/person available to transport on day of discharge  is , time TBD.  Patient Goals and Preferences: Return home.  Patient anticipates discharging to:  Return home with HH PT-referral sent to [facility]. Anticipated discharge tomorrow.  to transport. SW to continue following.

## 2019-07-18 NOTE — PROGRESS NOTES
07/18/19 1124   Quick Adds   Type of Visit Initial PT Evaluation   Living Environment   Lives With spouse   Living Arrangements house   Home Accessibility stairs to enter home   Number of Stairs, Main Entrance 2   Stair Railings, Main Entrance none   Transportation Anticipated family or friend will provide   Self-Care   Usual Activity Tolerance moderate   Current Activity Tolerance fair   Regular Exercise No   Equipment Currently Used at Home none   Functional Level Prior   Ambulation 0-->independent   Transferring 0-->independent   Toileting 0-->independent   Bathing 0-->independent   Fall history within last six months no   Which of the above functional risks had a recent onset or change? ambulation;transferring;toileting;bathing;dressing   General Information   Onset of Illness/Injury or Date of Surgery - Date 07/17/19   Referring Physician Whitney Johnson PA-C   Patient/Family Goals Statement Discharge home   Pertinent History of Current Problem (include personal factors and/or comorbidities that impact the POC) David Hogan is a 74 year old female with PMH significant for HTN and chronic back pain due to lumbar stenosis and recent h/o influenza and subsequent pneumonia, still on doxycycline, who was admitted on 7/17/2019 for generalized weakness.   Precautions/Limitations fall precautions   Weight-Bearing Status - LLE full weight-bearing   Weight-Bearing Status - RLE full weight-bearing   General Observations Pt supine upon initiation, agreeable to session.    Cognitive Status Examination   Orientation orientation to person, place and time   Level of Consciousness alert   Follows Commands and Answers Questions 100% of the time   Personal Safety and Judgment intact   Memory intact   Pain Assessment   Patient Currently in Pain Yes, see Vital Sign flowsheet   Integumentary/Edema   Integumentary/Edema no deficits were identifed   Posture    Posture Forward head position;Protracted shoulders   Range of  "Motion (ROM)   ROM Comment LE ROM WNL   Strength   Strength Comments Able to SLR B LE   Bed Mobility   Bed Mobility Comments sit<>supine SBA, pt requesitng assist throughout   Transfer Skills   Transfer Comments sit<>stand SBA/indep   Gait   Gait Comments SBA with no AD, Leon with walker   Balance   Balance Comments benefits from B UE support for safe dynamic mobility   Sensory Examination   Sensory Perception no deficits were identified   Coordination   Coordination no deficits were identified   Muscle Tone   Muscle Tone no deficits were identified   Clinical Impression   Criteria for Skilled Therapeutic Intervention evaluation only   PT Diagnosis impaired functional mobility   Influenced by the following impairments weakness   Functional limitations due to impairments Difficulty with bed mobility, transfers, ambulation   Clinical Presentation Stable/Uncomplicated   Clinical Presentation Rationale clear medical POC   Clinical Decision Making (Complexity) Low complexity   Predicted Duration of Therapy Intervention (days/wks) eval only   Anticipated Equipment Needs at Discharge walker   Anticipated Discharge Disposition Home with Home Therapy   Risk & Benefits of therapy have been explained Yes   Patient, Family & other staff in agreement with plan of care Yes   [facility] [employer] \"6 Clicks\"   2016, Trustees of [facility], under license to [employer].  All rights reserved.   6 Clicks Short Forms Basic Mobility Inpatient Short Form   [facility] [employer]  \"6 Clicks\" V.2 Basic Mobility Inpatient Short Form   1. Turning from your back to your side while in a flat bed without using bedrails? 4 - None   2. Moving from lying on your back to sitting on the side of a flat bed without using bedrails? 4 - None   3. Moving to and from a bed to a chair (including a wheelchair)? 4 - None   4. Standing up from a chair using your arms (e.g., wheelchair, or bedside chair)? 4 - None   5. To walk in hospital " room? 3 - A Little   6. Climbing 3-5 steps with a railing? 3 - A Little   Basic Mobility Raw Score (Score out of 24.Lower scores equate to lower levels of function) 22   Total Evaluation Time   Total Evaluation Time (Minutes) 16

## 2019-07-18 NOTE — PLAN OF CARE
PRIMARY DIAGNOSIS: GENERALIZED WEAKNESS     OUTPATIENT/OBSERVATION GOALS TO BE MET BEFORE DISCHARGE  1. Orthostatic performed: N/A     2. Tolerating PO medications: Yes     3. Return to near baseline physical activity: Yes     4. Cleared for discharge by consultants (if involved): N/A     Discharge Planner Nurse   Safe discharge environment identified: Yes  Barriers to discharge: Yes- NA level, PT in AM.         PT in AM. Pt is up frequently through the night to use the BR. A&O but forgetful. Pt felt restless but declined any pain meds.    Entered by: Mike Swanson 07/18/2019 1:13 AM  Please review provider order for any additional goals.   Nurse to notify provider when observation goals have been met and patient is ready for discharge.

## 2019-07-18 NOTE — PROGRESS NOTES
[facility]    Observation Unit  Hospitalist Progress Note  Name: David Hogan    MRN: [id number]  Provider:  Anh Wiggins PA-C  Date of Service: 07/18/2019    Assessment & Plan   Summary of Stay: David Hogan is a 74 year old female with PMH significant for HTN and chronic back pain due to lumbar stenosis and recent h/o influenza and subsequent pneumonia, still on doxycycline, who was admitted on 7/17/2019 for generalized weakness.     #Orthostatic hypotension: due to reports of ongoing dizziness today, checked orthostatic blood pressures with acute drop in SBP from 130/71 lying to 71/52 standing. Will continue IVFs today and continue to monitor.   - IVF hydration with NS at 100 ml/hour   - Recheck orthostatics in AM    #Generalized weakness: likely multifactorial and related to orthostatic hypotension from dehydration and deconditioning from influenza and pneumonia illness. As well patient has decreased mobility due to lumbar stenosis and was supposed to have surgery, which has been delayed due to recent illness.   - Continue PTA Doxycycline   - IVF hydration with NS  - PT consulted and recommended discharging with use of walker and home PT  - SW consulted to assist with setting up home PT    #Hyponatremia: sodium of 127 on admission with improvement to 130 today. Likely 2/2 dehydration, should improve with additional IVFs.     #Abdominal discomfort: reportedly a chronic issue. Started on daily Miralax on admission. Admitting provider discussed elimination diet with patient. Denies abdominal pain or distention today.   - continue Miralax daily     #HTN: only takes Losartan PRN, currently on hold     Still awaiting records requested from [facility].      DVT Prophylaxis: Low Risk/Ambulatory with no VTE prophylaxis indicated  Code Status: Full Code  Disposition: Expected discharge tomorrow once orthostatic blood pressure improves     Interval History   Patient reports ongoing dizziness today,  especially with standing. Patient was unable to walk very far with PT due to dizziness. She reports mild improvement in her weakness today. She notes mild intermittent dry cough. Denies lightheadedness, chest pain, shortness of breath, nausea, or vomiting.     -Data reviewed today: I reviewed all new labs and imaging reports over the last 24 hours.  Physical Exam   Temp: 97.4  F (36.3  C) Temp src: Oral BP: 105/69 Pulse: 83 Heart Rate: 80 Resp: 16 SpO2: 95 % O2 Device: None (Room air)    Vitals:    07/17/19 1023   Weight: 50.5 kg (111 lb 4.8 oz)     Vital Signs with Ranges  Temp:  [96.9  F (36.1  C)-98.6  F (37  C)] 97.4  F (36.3  C)  Pulse:  [76-83] 83  Heart Rate:  [76-88] 80  Resp:  [14-20] 16  BP: (105-153)/(66-87) 105/69  SpO2:  [93 %-98 %] 95 %  No intake/output data recorded.    GEN:  Alert, oriented x 3, appears comfortable, NAD.  HEENT:  Normocephalic/atraumatic, no scleral icterus, no nasal discharge, mouth moist.  CV:  Regular rate and rhythm, no murmur or JVD.  S1 + S2 noted, no S3 or S4.  LUNGS:  Clear to auscultation bilaterally without rales/rhonchi/wheezing/retractions.  Symmetric chest rise on inhalation noted.  ABD:  Active bowel sounds, soft, non-tender/non-distended.  No rebound/guarding/rigidity.  EXT:  No edema.  No cyanosis.  No acute joint synovitis noted.  SKIN:  Dry to touch, no exanthems noted in the visualized areas.    Medications     sodium chloride 100 mL/hr at 07/18/19 1243       [START ON 7/19/2019] alendronate  70 mg Oral Weekly     doxycycline hyclate  100 mg Oral BID     polyethylene glycol  17 g Oral Daily     sodium chloride (PF)  3 mL Intracatheter Q8H     Data     Results for orders placed or performed during the hospital encounter of 07/17/19   XR Chest 2 Views    Narrative    CHEST TWO VIEWS  7/17/2019 7:10 AM     HISTORY: Cough, right lower lobe wheezing    COMPARISON: None.      Impression    IMPRESSION: Hyperinflated lungs consistent with emphysematous changes.  Mild  apical scarring. Minimal increased markings at the left lung base  and anterior inferior lungs on lateral film consistent with mild  lingular or middle lobe infiltrate. Follow-up to ensure complete  resolution. No evidence for pleural effusion. Heart and pulmonary  vessels within normal limits.    SHIVANI MASSEY MD   CBC with platelets differential   Result Value Ref Range    WBC 7.9 4.0 - 11.0 10e9/L    RBC Count 3.82 3.8 - 5.2 10e12/L    Hemoglobin 12.1 11.7 - 15.7 g/dL    Hematocrit 35.6 35.0 - 47.0 %    MCV 93 78 - 100 fl    MCH 31.7 26.5 - 33.0 pg    MCHC 34.0 31.5 - 36.5 g/dL    RDW 12.6 10.0 - 15.0 %    Platelet Count 608 (H) 150 - 450 10e9/L    Diff Method Automated Method     % Neutrophils 52.1 %    % Lymphocytes 32.6 %    % Monocytes 11.5 %    % Eosinophils 1.9 %    % Basophils 1.1 %    % Immature Granulocytes 0.8 %    Nucleated RBCs 0 0 /100    Absolute Neutrophil 4.1 1.6 - 8.3 10e9/L    Absolute Lymphocytes 2.6 0.8 - 5.3 10e9/L    Absolute Monocytes 0.9 0.0 - 1.3 10e9/L    Absolute Eosinophils 0.2 0.0 - 0.7 10e9/L    Absolute Basophils 0.1 0.0 - 0.2 10e9/L    Abs Immature Granulocytes 0.1 0 - 0.4 10e9/L    Absolute Nucleated RBC 0.0    Basic metabolic panel   Result Value Ref Range    Sodium 127 (L) 133 - 144 mmol/L    Potassium 4.3 3.4 - 5.3 mmol/L    Chloride 94 94 - 109 mmol/L    Carbon Dioxide 27 20 - 32 mmol/L    Anion Gap 6 3 - 14 mmol/L    Glucose 111 (H) 70 - 99 mg/dL    Urea Nitrogen 17 7 - 30 mg/dL    Creatinine 0.54 0.52 - 1.04 mg/dL    GFR Estimate >90 >60 mL/min/[1.73_m2]    GFR Estimate If Black >90 >60 mL/min/[1.73_m2]    Calcium 9.4 8.5 - 10.1 mg/dL   UA with Microscopic   Result Value Ref Range    Color Urine Light Yellow     Appearance Urine Slightly Cloudy     Glucose Urine Negative NEG^Negative mg/dL    Bilirubin Urine Negative NEG^Negative    Ketones Urine Negative NEG^Negative mg/dL    Specific Gravity Urine 1.012 1.003 - 1.035    Blood Urine Negative NEG^Negative    pH Urine 7.0  5.0 - 7.0 pH    Protein Albumin Urine Negative NEG^Negative mg/dL    Urobilinogen mg/dL Normal 0.0 - 2.0 mg/dL    Nitrite Urine Negative NEG^Negative    Leukocyte Esterase Urine Negative NEG^Negative    Source Midstream Urine     WBC Urine 3 0 - 5 /HPF    RBC Urine 2 0 - 2 /HPF    Bacteria Urine Few (A) NEG^Negative /HPF    Squamous Epithelial /HPF Urine 3 (H) 0 - 1 /HPF    Mucous Urine Present (A) NEG^Negative /LPF    Amorphous Crystals Few (A) NEG^Negative /HPF   TSH with free T4 reflex   Result Value Ref Range    TSH 1.16 0.40 - 4.00 mU/L   Troponin I   Result Value Ref Range    Troponin I ES <0.015 0.000 - 0.045 ug/L   CK total   Result Value Ref Range    CK Total 111 30 - 225 U/L   Basic metabolic panel   Result Value Ref Range    Sodium 130 (L) 133 - 144 mmol/L    Potassium 3.6 3.4 - 5.3 mmol/L    Chloride 98 94 - 109 mmol/L    Carbon Dioxide 27 20 - 32 mmol/L    Anion Gap 5 3 - 14 mmol/L    Glucose 98 70 - 99 mg/dL    Urea Nitrogen 9 7 - 30 mg/dL    Creatinine 0.44 (L) 0.52 - 1.04 mg/dL    GFR Estimate >90 >60 mL/min/[1.73_m2]    GFR Estimate If Black >90 >60 mL/min/[1.73_m2]    Calcium 9.1 8.5 - 10.1 mg/dL   CBC with platelets   Result Value Ref Range    WBC 6.5 4.0 - 11.0 10e9/L    RBC Count 3.75 (L) 3.8 - 5.2 10e12/L    Hemoglobin 11.8 11.7 - 15.7 g/dL    Hematocrit 35.3 35.0 - 47.0 %    MCV 94 78 - 100 fl    MCH 31.5 26.5 - 33.0 pg    MCHC 33.4 31.5 - 36.5 g/dL    RDW 12.7 10.0 - 15.0 %    Platelet Count 584 (H) 150 - 450 10e9/L   EKG 12 lead   Result Value Ref Range    Interpretation ECG Click View Image link to view waveform and result    Care Coordinator IP Consult    Narrative    Mary Ann Nava RN     7/17/2019 12:03 PM  RN CTS acknowledges consult.  SW consulted for discharge assessment and planning.     CM available for any further discharge planning or needs.    Mayr Ann Nava MA-CAL  Care Transition Services  [phone]     Urine Culture   Result Value Ref Range    Specimen Description Midstream  Urine     Special Requests Specimen received in preservative     Culture Micro       <10,000 colonies/mL  urogenital amado  Susceptibility testing not routinely done       Anh Wiggins PA-C

## 2019-07-18 NOTE — PLAN OF CARE
PRIMARY DIAGNOSIS: GENERALIZED WEAKNESS     OUTPATIENT/OBSERVATION GOALS TO BE MET BEFORE DISCHARGE  1. Orthostatic performed: N/A     2. Tolerating PO medications: Yes     3. Return to near baseline physical activity: Yes     4. Cleared for discharge by consultants (if involved): N/A     Discharge Planner Nurse   Safe discharge environment identified: Yes  Barriers to discharge: Yes- NA level, PT in AM.     PT in AM. Pt is up frequently through the night to use the BR. A&O but forgetful. Pt states feeling stronger. SL. PT/SW will see this AM.     Entered by: Mike Swanson 07/18/2019 3:52 AM.  Please review provider order for any additional goals.   Nurse to notify provider when observation goals have been met and patient is ready for discharge.

## 2019-07-18 NOTE — PLAN OF CARE
"PT: Orders received. PT evaluation completed. Pt reports living in a house with 2 steps to enter and no stairs within. Pt lives with her spouse who is able to help/supervise as needed. Pt planning on lumbar surgery later this month.     Discharge Planner PT   Patient plan for discharge: Home  Current status: Pt supine upon initiation, agreeable to session. Pt completes supine>sit with SBA. Pt completes sit<>stand with SBA. Pt ambulates ~10' with use of no assistive device. Pt initially steady, but then reports feeling very weak. Returns to sitting EOB, BP assessed at 102/58. Pt supine at end of session, \"weakness\" improved. BP reassessed at 121/61. Pt encouraged to ambulate with nursing later this date. Also recommend use of walker at discharge-pt appears agreeable-will place order. Throughout session, pt asking this writer for assist with mobility-however, when encouraged to complete on her own she is able to. Recommend nursing encouraged pt to complete basic mobility with SBA.   Barriers to return to prior living situation: Decreased activity tolerance  Recommendations for discharge: home with HHPT  Rationale for recommendations: Anticipate pt will be able to mobilize safely at home with use of a walker. Recommend HHPT as pt would require assist of a person and an assistive device to leave the home for OPPT. Pt needs to maximize strength/mobility in the home before spinal surgery later this month.        Entered by: Hannah Cottrell 07/18/2019 11:27 AM       "

## 2019-07-18 NOTE — PLAN OF CARE
PRIMARY DIAGNOSIS: GENERALIZED WEAKNESS     OUTPATIENT/OBSERVATION GOALS TO BE MET BEFORE DISCHARGE  1. Orthostatic performed: N/A     2. Tolerating PO medications: Yes     3. Return to near baseline physical activity: Yes     4. Cleared for discharge by consultants (if involved): N/A     Discharge Planner Nurse   Safe discharge environment identified: Yes  Barriers to discharge: Yes     A&O, forgetful.  VSS.  Back pain managed with rest, repositioning, prn Tylenol.  Up with walker/gait belt/assist 1, unsteady at times, holds onto furniture if no walker.  Na 130, NS at 100cc/hr IV for 6 hrs infusing as ordered.   at bedside.  PT pending.  Will continue to monitor.       Please review provider order for any additional goals.   Nurse to notify provider when observation goals have been met and patient is ready for discharge.

## 2019-07-18 NOTE — PROGRESS NOTES
"Patient calling about every 15 minutes to use bathroom, voiding small amounts.  When approached on frequency denies pain/burning, became agitated when encouraged to wait to use bathroom, stated \"I can use the bathroom when I need to\", pushed IV pole aggressively, calmed when nurse addressed behavior and safety of patient and staff.  Bed alarm on for safety.  Will continue to monitor.  "

## 2019-07-18 NOTE — PLAN OF CARE
PRIMARY DIAGNOSIS: GENERALIZED WEAKNESS     OUTPATIENT/OBSERVATION GOALS TO BE MET BEFORE DISCHARGE  1. Orthostatic performed: N/A     2. Tolerating PO medications: Yes     3. Return to near baseline physical activity: Yes     4. Cleared for discharge by consultants (if involved): N/A     Discharge Planner Nurse   Safe discharge environment identified: Yes  Barriers to discharge: Yes     A&O, forgetful.  VSS. BP softer than this am 105/69.   Back pain managed with rest, repositioning, prn Tylenol.  Up with walker/gait belt/assist 1, up with PT, was too weak to ambulate outside room.   Will attempt walk in [place] with staff after lunch.   Na 130, NS at 100cc/hr IV for 6 hrs infusing as ordered.   at bedside.   Will continue to monitor.        Please review provider order for any additional goals.   Nurse to notify provider when observation goals have been met and patient is ready for discharge.

## 2019-07-19 PROBLEM — I95.1 ORTHOSTATIC HYPOTENSION: Status: ACTIVE | Noted: 2019-07-19

## 2019-07-19 LAB
ANION GAP SERPL CALCULATED.3IONS-SCNC: 4 MMOL/L (ref 3–14)
BUN SERPL-MCNC: 9 MG/DL (ref 7–30)
CALCIUM SERPL-MCNC: 8.6 MG/DL (ref 8.5–10.1)
CHLORIDE SERPL-SCNC: 100 MMOL/L (ref 94–109)
CO2 SERPL-SCNC: 27 MMOL/L (ref 20–32)
CREAT SERPL-MCNC: 0.45 MG/DL (ref 0.52–1.04)
GFR SERPL CREATININE-BSD FRML MDRD: >90 ML/MIN/{1.73_M2}
GLUCOSE SERPL-MCNC: 90 MG/DL (ref 70–99)
POTASSIUM SERPL-SCNC: 3.9 MMOL/L (ref 3.4–5.3)
SODIUM SERPL-SCNC: 131 MMOL/L (ref 133–144)

## 2019-07-19 PROCEDURE — 25000132 ZZH RX MED GY IP 250 OP 250 PS 637: Mod: GY | Performed by: PHYSICIAN ASSISTANT

## 2019-07-19 PROCEDURE — 12000011 ZZH R&B MS OVERFLOW

## 2019-07-19 PROCEDURE — 80048 BASIC METABOLIC PNL TOTAL CA: CPT | Performed by: PHYSICIAN ASSISTANT

## 2019-07-19 PROCEDURE — G0378 HOSPITAL OBSERVATION PER HR: HCPCS

## 2019-07-19 PROCEDURE — 36415 COLL VENOUS BLD VENIPUNCTURE: CPT | Performed by: PHYSICIAN ASSISTANT

## 2019-07-19 PROCEDURE — 99232 SBSQ HOSP IP/OBS MODERATE 35: CPT | Performed by: PHYSICIAN ASSISTANT

## 2019-07-19 PROCEDURE — 25000128 H RX IP 250 OP 636: Performed by: PHYSICIAN ASSISTANT

## 2019-07-19 RX ORDER — SENNOSIDES 8.6 MG
1-2 TABLET ORAL DAILY
Status: DISCONTINUED | OUTPATIENT
Start: 2019-07-19 | End: 2019-07-20 | Stop reason: HOSPADM

## 2019-07-19 RX ORDER — ALENDRONATE SODIUM 70 MG/1
70 TABLET ORAL WEEKLY
Status: DISCONTINUED | OUTPATIENT
Start: 2019-07-20 | End: 2019-07-20 | Stop reason: HOSPADM

## 2019-07-19 RX ADMIN — ACETAMINOPHEN 650 MG: 325 TABLET, FILM COATED ORAL at 16:50

## 2019-07-19 RX ADMIN — DOXYCYCLINE HYCLATE 100 MG: 100 CAPSULE ORAL at 10:27

## 2019-07-19 RX ADMIN — POTASSIUM CHLORIDE AND SODIUM CHLORIDE: 900; 150 INJECTION, SOLUTION INTRAVENOUS at 15:59

## 2019-07-19 RX ADMIN — SENNOSIDES 1 TABLET: 8.6 TABLET, FILM COATED ORAL at 16:04

## 2019-07-19 RX ADMIN — SODIUM CHLORIDE 1000 ML: 9 INJECTION, SOLUTION INTRAVENOUS at 13:45

## 2019-07-19 RX ADMIN — POLYETHYLENE GLYCOL 3350 17 G: 17 POWDER, FOR SOLUTION ORAL at 10:26

## 2019-07-19 RX ADMIN — POTASSIUM CHLORIDE AND SODIUM CHLORIDE: 900; 150 INJECTION, SOLUTION INTRAVENOUS at 04:51

## 2019-07-19 NOTE — PLAN OF CARE
PRIMARY DIAGNOSIS: GENERALIZED WEAKNESS     OUTPATIENT/OBSERVATION GOALS TO BE MET BEFORE DISCHARGE  1. Orthostatic performed: Yes:          Lying Orthostatic BP: 160/79         Sitting Orthostatic BP: 106/60         Standing Orthostatic BP: 71/52      2. Tolerating PO medications: Yes     3. Return to near baseline physical activity: Yes     4. Cleared for discharge by consultants (if involved): N/A     Discharge Planner Nurse   Safe discharge environment identified: Yes  Barriers to discharge: No       Entered by: Na Hernandez 07/18/2019 9:20 PM  Please review provider order for any additional goals.   Nurse to notify provider when observation goals have been met and patient is ready for discharge.        VSS. A and Ox4, but impulsive and forgetful. Pt. Has been restless and up to bathroom several times throughout the evening, bed alarm is on and audible. Pt. Is resting in bed, will continue to monitor.

## 2019-07-19 NOTE — PROGRESS NOTES
[facility]    Observation Unit  Hospitalist Progress Note  Name: David Hogan    MRN: [id number]  Provider:  Anh Wiggins PA-C  Date of Service: 07/19/2019    Assessment & Plan   Summary of Stay: David Hogan is a 74 year old female with PMH significant for HTN and chronic back pain due to lumbar stenosis and recent h/o influenza and subsequent pneumonia, still on doxycycline, who was admitted on 7/17/2019 for generalized weakness.     #Orthostatic hypotension: continues to have positive orthostatics today with SBP dropping from 140 to 80 when standing. Patient is symptomatic with reported dizziness.   - Give 1 L NS bolus over 2 hours then restart maintenance fluids   - Continue IVF hydration with NS at 100 ml/hour   - Continue to monitor orthostatic blood pressures  - Trial compression stockings     #Generalized weakness: slowly improving, likely multifactorial and related to orthostatic hypotension from dehydration and deconditioning from influenza and pneumonia illness. As well patient has decreased mobility due to lumbar stenosis and was supposed to have surgery, which has been delayed due to recent illness.   - Completed last dose of Doxycycline this morning (had 5 doses to finish at time of admission)  - IVF hydration with NS  - PT consulted and recommended discharging with use of walker and home PT  - SW consulted to assist with setting up home PT    #Hyponatremia: sodium of 127 on admission with improvement to 131 today. Likely 2/2 dehydration with slow improvement with IVFs.     #Abdominal discomfort: reportedly a chronic issue. Started on daily Miralax on admission. Admitting provider discussed elimination diet with patient. Denies abdominal pain or distention today.   - continue Miralax daily     #HTN: only takes Losartan PRN, currently on hold     Still awaiting records requested from [facility].      DVT Prophylaxis: Low Risk/Ambulatory with no VTE prophylaxis indicated  Code Status:  Full Code  Disposition: Expected discharge tomorrow once orthostatic blood pressure improves     Interval History   Patient reports ongoing weakness that has improved since admission. She notes dizziness with standing. Patient is upset and wishes she could discharge home today.    Patient's  is not in the room during interview today, will update when he returns this afternoon.     -Data reviewed today: I reviewed all new labs and imaging reports over the last 24 hours.  Physical Exam   Temp: 96.4  F (35.8  C) Temp src: Oral BP: 146/77 Pulse: 83 Heart Rate: 84 Resp: 18 SpO2: 99 % O2 Device: None (Room air)    Vitals:    07/17/19 1023   Weight: 50.5 kg (111 lb 4.8 oz)     Vital Signs with Ranges  Temp:  [96.4  F (35.8  C)-98.3  F (36.8  C)] 96.4  F (35.8  C)  Pulse:  [81-83] 83  Heart Rate:  [84-87] 84  Resp:  [14-18] 18  BP: (117-175)/(72-86) 146/77  SpO2:  [93 %-99 %] 99 %  I/O last 3 completed shifts:  In: -   Out: 200 [Urine:200]    GEN:  Alert, oriented x 3, appears comfortable, NAD.  HEENT:  Normocephalic/atraumatic, no scleral icterus, no nasal discharge, mouth moist.  CV:  Regular rate and rhythm, no murmur or JVD.  S1 + S2 noted, no S3 or S4.  LUNGS:  Clear to auscultation bilaterally without rales/rhonchi/wheezing/retractions. Symmetric chest rise on inhalation noted.  ABD:  Active bowel sounds, soft, non-tender/non-distended.  No rebound/guarding/rigidity.  EXT:  No edema.  No cyanosis.  No acute joint synovitis noted.  SKIN:  Dry to touch, no exanthems noted in the visualized areas.    Medications     0.9% sodium chloride + KCl 20 mEq/L 100 mL/hr at 07/19/19 0451     sodium chloride 100 mL/hr at 07/18/19 1243       alendronate  70 mg Oral Weekly     doxycycline hyclate  100 mg Oral BID     polyethylene glycol  17 g Oral Daily     sodium chloride (PF)  3 mL Intracatheter Q8H     Data     Results for orders placed or performed during the hospital encounter of 07/17/19   XR Chest 2 Views    Narrative     CHEST TWO VIEWS  7/17/2019 7:10 AM     HISTORY: Cough, right lower lobe wheezing    COMPARISON: None.      Impression    IMPRESSION: Hyperinflated lungs consistent with emphysematous changes.  Mild apical scarring. Minimal increased markings at the left lung base  and anterior inferior lungs on lateral film consistent with mild  lingular or middle lobe infiltrate. Follow-up to ensure complete  resolution. No evidence for pleural effusion. Heart and pulmonary  vessels within normal limits.    SHIVANI MASSEY MD   CBC with platelets differential   Result Value Ref Range    WBC 7.9 4.0 - 11.0 10e9/L    RBC Count 3.82 3.8 - 5.2 10e12/L    Hemoglobin 12.1 11.7 - 15.7 g/dL    Hematocrit 35.6 35.0 - 47.0 %    MCV 93 78 - 100 fl    MCH 31.7 26.5 - 33.0 pg    MCHC 34.0 31.5 - 36.5 g/dL    RDW 12.6 10.0 - 15.0 %    Platelet Count 608 (H) 150 - 450 10e9/L    Diff Method Automated Method     % Neutrophils 52.1 %    % Lymphocytes 32.6 %    % Monocytes 11.5 %    % Eosinophils 1.9 %    % Basophils 1.1 %    % Immature Granulocytes 0.8 %    Nucleated RBCs 0 0 /100    Absolute Neutrophil 4.1 1.6 - 8.3 10e9/L    Absolute Lymphocytes 2.6 0.8 - 5.3 10e9/L    Absolute Monocytes 0.9 0.0 - 1.3 10e9/L    Absolute Eosinophils 0.2 0.0 - 0.7 10e9/L    Absolute Basophils 0.1 0.0 - 0.2 10e9/L    Abs Immature Granulocytes 0.1 0 - 0.4 10e9/L    Absolute Nucleated RBC 0.0    Basic metabolic panel   Result Value Ref Range    Sodium 127 (L) 133 - 144 mmol/L    Potassium 4.3 3.4 - 5.3 mmol/L    Chloride 94 94 - 109 mmol/L    Carbon Dioxide 27 20 - 32 mmol/L    Anion Gap 6 3 - 14 mmol/L    Glucose 111 (H) 70 - 99 mg/dL    Urea Nitrogen 17 7 - 30 mg/dL    Creatinine 0.54 0.52 - 1.04 mg/dL    GFR Estimate >90 >60 mL/min/[1.73_m2]    GFR Estimate If Black >90 >60 mL/min/[1.73_m2]    Calcium 9.4 8.5 - 10.1 mg/dL   UA with Microscopic   Result Value Ref Range    Color Urine Light Yellow     Appearance Urine Slightly Cloudy     Glucose Urine Negative  NEG^Negative mg/dL    Bilirubin Urine Negative NEG^Negative    Ketones Urine Negative NEG^Negative mg/dL    Specific Gravity Urine 1.012 1.003 - 1.035    Blood Urine Negative NEG^Negative    pH Urine 7.0 5.0 - 7.0 pH    Protein Albumin Urine Negative NEG^Negative mg/dL    Urobilinogen mg/dL Normal 0.0 - 2.0 mg/dL    Nitrite Urine Negative NEG^Negative    Leukocyte Esterase Urine Negative NEG^Negative    Source Midstream Urine     WBC Urine 3 0 - 5 /HPF    RBC Urine 2 0 - 2 /HPF    Bacteria Urine Few (A) NEG^Negative /HPF    Squamous Epithelial /HPF Urine 3 (H) 0 - 1 /HPF    Mucous Urine Present (A) NEG^Negative /LPF    Amorphous Crystals Few (A) NEG^Negative /HPF   TSH with free T4 reflex   Result Value Ref Range    TSH 1.16 0.40 - 4.00 mU/L   Troponin I   Result Value Ref Range    Troponin I ES <0.015 0.000 - 0.045 ug/L   CK total   Result Value Ref Range    CK Total 111 30 - 225 U/L   Basic metabolic panel   Result Value Ref Range    Sodium 130 (L) 133 - 144 mmol/L    Potassium 3.6 3.4 - 5.3 mmol/L    Chloride 98 94 - 109 mmol/L    Carbon Dioxide 27 20 - 32 mmol/L    Anion Gap 5 3 - 14 mmol/L    Glucose 98 70 - 99 mg/dL    Urea Nitrogen 9 7 - 30 mg/dL    Creatinine 0.44 (L) 0.52 - 1.04 mg/dL    GFR Estimate >90 >60 mL/min/[1.73_m2]    GFR Estimate If Black >90 >60 mL/min/[1.73_m2]    Calcium 9.1 8.5 - 10.1 mg/dL   CBC with platelets   Result Value Ref Range    WBC 6.5 4.0 - 11.0 10e9/L    RBC Count 3.75 (L) 3.8 - 5.2 10e12/L    Hemoglobin 11.8 11.7 - 15.7 g/dL    Hematocrit 35.3 35.0 - 47.0 %    MCV 94 78 - 100 fl    MCH 31.5 26.5 - 33.0 pg    MCHC 33.4 31.5 - 36.5 g/dL    RDW 12.7 10.0 - 15.0 %    Platelet Count 584 (H) 150 - 450 10e9/L   Basic metabolic panel   Result Value Ref Range    Sodium 131 (L) 133 - 144 mmol/L    Potassium 3.9 3.4 - 5.3 mmol/L    Chloride 100 94 - 109 mmol/L    Carbon Dioxide 27 20 - 32 mmol/L    Anion Gap 4 3 - 14 mmol/L    Glucose 90 70 - 99 mg/dL    Urea Nitrogen 9 7 - 30 mg/dL     Creatinine 0.45 (L) 0.52 - 1.04 mg/dL    GFR Estimate >90 >60 mL/min/[1.73_m2]    GFR Estimate If Black >90 >60 mL/min/[1.73_m2]    Calcium 8.6 8.5 - 10.1 mg/dL   EKG 12 lead   Result Value Ref Range    Interpretation ECG Click View Image link to view waveform and result    Care Coordinator IP Consult    Mary Ann Cota RN     7/17/2019 12:03 PM  RN CTS acknowledges consult.  SW consulted for discharge assessment and planning.     CM available for any further discharge planning or needs.    Mary Ann DEMPSEY  Care Transition Services  [phone]     Social Work IP Consult    Narrative    Emely Ellison BSW     7/18/2019  2:38 PM  Care Transition Initial Assessment - SW     Met with: Patient and spouse  Active Problems:    Generalized weakness       DATA  Lives With: spouse      Quality of Family Relationships: helpful, involved  Description of Support System: Supportive, Involved  Who is your support system?:   Support Assessment: Adequate family and caregiver support,   Adequate social supports.   Identified issues/concerns regarding health management: Pt   resides at home with her . Admitted under observation for   generalized weakness. At baseline, she ambulates independently   and is fairly independent with ADLs and IADLs.  assists as   needed. Pt currently going to outpatient PT at [facility]. PT   evaluated pt and recommended HH PT. Pt agreeable, and has elected   [facility]. Referral sent via email. Pt to be sent home with a walker.     Quality of Family Relationships: helpful, involved     ASSESSMENT  Cognitive Status:  awake, alert and oriented, some confusion  Concerns to be addressed: Discharge planning. Referral sent to   [facility] via email.      PLAN  Financial costs for the patient includes: None anticipated.  Patient given options and choices for discharge: Yes.  Patient/family is agreeable to the plan?  Yes: Agreeable to HC.  Transportation/person available to transport on  day of discharge    is , time TBD.  Patient Goals and Preferences: Return home.  Patient anticipates discharging to:  Return home with HH   PT-referral sent to [facility]. Anticipated discharge tomorrow.    to transport. SW to continue following.          Urine Culture   Result Value Ref Range    Specimen Description Midstream Urine     Special Requests Specimen received in preservative     Culture Micro       <10,000 colonies/mL  urogenital amado  Susceptibility testing not routinely done       Anh Wiggins PA-C

## 2019-07-19 NOTE — PLAN OF CARE
Patient continues to have orthostatic hypotension, with SBP dropping into 70-80's today. Pt received 1 liter NS bolus. Encouraged guarded activity as able. Afebrile. Tolerating diet. Pt's  at bedside. Of note, the patient's  has been helping the patient sit up at the side of the bed and turns the bed alarm off. Patient and family was educated on safety reasons for alarms.     Of note, patient did ambulate to bathroom without dizziness. Pt then sat up in the chair for approximately 15 minutes; BP at that time was 127/70. Pt continued to be asymptomatic (no dizziness/lightheadedness).

## 2019-07-19 NOTE — PLAN OF CARE
PRIMARY DIAGNOSIS: GENERALIZED WEAKNESS    OUTPATIENT/OBSERVATION GOALS TO BE MET BEFORE DISCHARGE  1. Orthostatic performed: Yes:          Lying Orthostatic BP: 157/82         Sitting Orthostatic BP: 138/85         Standing Orthostatic BP: 97/62     2. Tolerating PO medications: Yes    3. Return to near baseline physical activity: Yes    4. Cleared for discharge by consultants (if involved): Yes    Discharge Planner Nurse   Safe discharge environment identified: Yes  Barriers to discharge: Yes       Entered by: Whitney Allen 07/19/2019 4:03 AM     Please review provider order for any additional goals.   Nurse to notify provider when observation goals have been met and patient is ready for discharge.    AxO, Forgetful. VSS. Afebrile. 3/10 right wrist pain. Declined PRN pain mediation. Tolerating regular diet. Up frequently to bathroom. Refusing bladder scan. Upset about hat in toilet to measure urine. Hat removed with improvement in patient's demeanor. Wanting IV removed. Spoke to pt at length regarding plan of care and reasoning for IV. Ok with remaining in place at this time. Will continue to monitor.

## 2019-07-19 NOTE — PLAN OF CARE
PRIMARY DIAGNOSIS: GENERALIZED WEAKNESS    OUTPATIENT/OBSERVATION GOALS TO BE MET BEFORE DISCHARGE  1. Orthostatic performed: Yes:          Lying Orthostatic BP: 157/82         Sitting Orthostatic BP: 138/85         Standing Orthostatic BP: 97/62     2. Tolerating PO medications: Yes    3. Return to near baseline physical activity: Yes    4. Cleared for discharge by consultants (if involved): Yes    Discharge Planner Nurse   Safe discharge environment identified: Yes  Barriers to discharge: Yes       Entered by: Whitney Allen 07/19/2019 12:57 AM     Please review provider order for any additional goals.   Nurse to notify provider when observation goals have been met and patient is ready for discharge.    AxO, Forgetful. VSS. Afebrile. 3/10 right wrist pain. Declined PRN pain mediation. Tolerating regular diet. Up frequently to bathroom. Refusing bladder scan. Upset about hat in toilet to measure urine. Wanting IV removed. Spoke to pt at length regarding plan of care and reasoning for IV. Ok with remaining in place at this time. Will continue to monitor.

## 2019-07-20 ENCOUNTER — APPOINTMENT (OUTPATIENT)
Dept: PHYSICAL THERAPY | Facility: CLINIC | Age: 74
DRG: 312 | End: 2019-07-20
Attending: INTERNAL MEDICINE
Payer: MEDICARE

## 2019-07-20 VITALS
OXYGEN SATURATION: 97 % | HEART RATE: 76 BPM | DIASTOLIC BLOOD PRESSURE: 74 MMHG | TEMPERATURE: 97.6 F | WEIGHT: 111.3 LBS | SYSTOLIC BLOOD PRESSURE: 137 MMHG | HEIGHT: 64 IN | RESPIRATION RATE: 16 BRPM | BODY MASS INDEX: 19 KG/M2

## 2019-07-20 LAB
ANION GAP SERPL CALCULATED.3IONS-SCNC: 5 MMOL/L (ref 3–14)
BASOPHILS # BLD AUTO: 0.1 10E9/L (ref 0–0.2)
BASOPHILS NFR BLD AUTO: 1.8 %
BUN SERPL-MCNC: 7 MG/DL (ref 7–30)
CALCIUM SERPL-MCNC: 8.6 MG/DL (ref 8.5–10.1)
CHLORIDE SERPL-SCNC: 100 MMOL/L (ref 94–109)
CO2 SERPL-SCNC: 26 MMOL/L (ref 20–32)
CREAT SERPL-MCNC: 0.41 MG/DL (ref 0.52–1.04)
DIFFERENTIAL METHOD BLD: ABNORMAL
EOSINOPHIL # BLD AUTO: 0.1 10E9/L (ref 0–0.7)
EOSINOPHIL NFR BLD AUTO: 2.1 %
ERYTHROCYTE [DISTWIDTH] IN BLOOD BY AUTOMATED COUNT: 12.7 % (ref 10–15)
GFR SERPL CREATININE-BSD FRML MDRD: >90 ML/MIN/{1.73_M2}
GLUCOSE SERPL-MCNC: 91 MG/DL (ref 70–99)
HCT VFR BLD AUTO: 33 % (ref 35–47)
HGB BLD-MCNC: 10.9 G/DL (ref 11.7–15.7)
IMM GRANULOCYTES # BLD: 0 10E9/L (ref 0–0.4)
IMM GRANULOCYTES NFR BLD: 0.4 %
LYMPHOCYTES # BLD AUTO: 2.3 10E9/L (ref 0.8–5.3)
LYMPHOCYTES NFR BLD AUTO: 33.6 %
MCH RBC QN AUTO: 31 PG (ref 26.5–33)
MCHC RBC AUTO-ENTMCNC: 33 G/DL (ref 31.5–36.5)
MCV RBC AUTO: 94 FL (ref 78–100)
MONOCYTES # BLD AUTO: 0.8 10E9/L (ref 0–1.3)
MONOCYTES NFR BLD AUTO: 12.4 %
NEUTROPHILS # BLD AUTO: 3.4 10E9/L (ref 1.6–8.3)
NEUTROPHILS NFR BLD AUTO: 49.7 %
NRBC # BLD AUTO: 0 10*3/UL
NRBC BLD AUTO-RTO: 0 /100
PLATELET # BLD AUTO: 472 10E9/L (ref 150–450)
POTASSIUM SERPL-SCNC: 3.9 MMOL/L (ref 3.4–5.3)
RBC # BLD AUTO: 3.52 10E12/L (ref 3.8–5.2)
SODIUM SERPL-SCNC: 131 MMOL/L (ref 133–144)
WBC # BLD AUTO: 6.8 10E9/L (ref 4–11)

## 2019-07-20 PROCEDURE — 80048 BASIC METABOLIC PNL TOTAL CA: CPT | Performed by: PHYSICIAN ASSISTANT

## 2019-07-20 PROCEDURE — 99239 HOSP IP/OBS DSCHRG MGMT >30: CPT | Performed by: INTERNAL MEDICINE

## 2019-07-20 PROCEDURE — 85025 COMPLETE CBC W/AUTO DIFF WBC: CPT | Performed by: PHYSICIAN ASSISTANT

## 2019-07-20 PROCEDURE — 97530 THERAPEUTIC ACTIVITIES: CPT | Mod: GP | Performed by: PHYSICAL THERAPIST

## 2019-07-20 PROCEDURE — 97164 PT RE-EVAL EST PLAN CARE: CPT | Mod: GP | Performed by: PHYSICAL THERAPIST

## 2019-07-20 PROCEDURE — 25000132 ZZH RX MED GY IP 250 OP 250 PS 637: Mod: GY | Performed by: PHYSICIAN ASSISTANT

## 2019-07-20 PROCEDURE — 36415 COLL VENOUS BLD VENIPUNCTURE: CPT | Performed by: PHYSICIAN ASSISTANT

## 2019-07-20 PROCEDURE — 97116 GAIT TRAINING THERAPY: CPT | Mod: GP | Performed by: PHYSICAL THERAPIST

## 2019-07-20 RX ADMIN — ACETAMINOPHEN 650 MG: 325 TABLET, FILM COATED ORAL at 12:33

## 2019-07-20 RX ADMIN — POLYETHYLENE GLYCOL 3350 17 G: 17 POWDER, FOR SOLUTION ORAL at 07:50

## 2019-07-20 RX ADMIN — ALENDRONATE SODIUM 70 MG: 70 TABLET ORAL at 07:42

## 2019-07-20 RX ADMIN — ACETAMINOPHEN 650 MG: 325 TABLET, FILM COATED ORAL at 07:50

## 2019-07-20 NOTE — PLAN OF CARE
Physical Therapy Discharge Summary    Reason for therapy discharge:    Discharged to home with home therapy.    Progress towards therapy goal(s). See goals on Care Plan in [facility] electronic health record for goal details.  Goals partially met.  Barriers to achieving goals:   discharge from facility.    Therapy recommendation(s):    Continued therapy is recommended.  Rationale/Recommendations:  Home PT/OT.

## 2019-07-20 NOTE — DISCHARGE INSTRUCTIONS
[facility] [phone]  They should call you in 24-48 hours following d.c     1. Make sure to drink and eat throughout the day to prevent dehydration.  2. Follow up with your primary care doctor within one week.  At that time you should have your sodium rechecked. You will also need your blood pressure checked (I would also recommend checking orthostatics at that time).  3. You will need a repeat chest xray in 6-8 weeks to ensure that it has normalized after being treated for pneumonia.

## 2019-07-20 NOTE — PLAN OF CARE
"/66 (BP Location: Left arm)   Pulse 83   Temp 97.4  F (36.3  C) (Oral)   Resp 16   Ht 1.626 m (5' 4\")   Wt 50.5 kg (111 lb 4.8 oz)   SpO2 94%   BMI 19.10 kg/m    Neuro: A&Ox4 with some forgetfulness  Cardiac: WDL  Lungs: WDL, LS clear and equal bilateral  GI: WDL  : Very frequent, calls for assistance  Pain: Denies  IV: NS w/ K+ 20 mEq infusing @ 75mL/hr  Diet: Reg  Activity: SBA  Plan: discharge home w/ PT today    "

## 2019-07-20 NOTE — PLAN OF CARE
"/74 (BP Location: Left arm)   Pulse 83   Temp 97.6  F (36.4  C)   Resp 14   Ht 1.626 m (5' 4\")   Wt 50.5 kg (111 lb 4.8 oz)   SpO2 98%   BMI 19.10 kg/m        Neuro: Ex. Has intermittent forgetfulness.   Cardiac: WNL   Lungs: WNL, Infrequent cough at times.   GI: WNL. LBM: 07/19/19  : Frequency. Continent   Pain: Denied on assessment.   IV: IVF running per orders.   Meds: PO meds ordered.   Diet: Regular diet   Activity: SBA.     Plan: Discharge tomorrow with  and home care PT.     Will continue to monitor and provide cares.    "

## 2019-07-20 NOTE — DISCHARGE SUMMARY
[facility]  Discharge Summary  Name: David Hogan    MRN: [id number]  YOB: 1945    Age: 74 year old  Date of Discharge:  7/20/2019  Date of Admission: 7/17/2019  Primary Care Provider: Jessica Thompson  Discharge Physician:  Amanda Paige MD  Discharging Service:  Hospitalist      Discharge Diagnoses:  1.  Orthostatic hypotension  2.  Generalized weakness  3.  Hyponatremia  4.  Hypertension     Follow-ups Needed After Discharge   Follow-up with primary care doctor within 1 week, at that time BMP should be repeated.  Will need repeat chest x-ray in approximately 8 weeks.    Unresulted Labs Ordered in the Past 30 Days of this Admission     No orders found from 10/16/2018 to 12/16/2018.        Hospital Course:  David Hogan is a 74 year old female with PMH significant for HTN and chronic back pain due to lumbar stenosis and recent h/o influenza and subsequent pneumonia (still on doxycycline on admission) who was admitted on 7/17/2019 for generalized weakness and was subsequently found to have hyponatremia and orthostatic hypotension.  Patient has improved and will discharge home with home therapies.     Orthostatic hypotension:  Patient initially had dizziness and weakness.  She persistently had positive orthostatics during her hospital stay.  She was treated with IV fluids and on the day of discharge despite having positive orthostatics she no longer felt dizzy or lightheaded with movement.  I suspect given persistent positive orthostatics without symptoms she does have some orthostasis at baseline.  She had recently been taken off blood pressure medications for relative hypotension.  Discussed extensively with the patient and her .  Encouraged her to drink fluids throughout the day as well as eat regularly.  Recommend blood pressure check with orthostatics at her follow-up primary care appointment.       Generalized weakness: slowly improving, likely multifactorial and  "related to orthostatic hypotension from dehydration and deconditioning from influenza and pneumonia illness. As well patient has decreased mobility due to lumbar stenosis and was supposed to have surgery, which has been delayed due to recent illness.   was very concerned about the patient possibly needing TCU.  Physical therapy performed a reassessment on the day of discharge and she did very well, does not need TCU.  She will go home with home PT and OT.    Recent diagnosis of pneumonia: Patient had been on doxycycline prior to admission, she continued her course of antibiotics prior to discharge.  She will need repeat chest x-ray in 6 to 8 weeks to ensure resolution of infiltrates.  This was discussed with the patient and her .     Hyponatremia: sodium of 127 on admission with improvement to 131 today. Likely 2/2 dehydration with slow improvement with IVFs.   Recommend repeat BMP with primary care follow-up.     HTN: Had been on losartan but recently this was changed to only as needed.  She did not receive any antihypertensives while here.  Follow-up with primary care within 1 week to recheck blood pressure.     Discharge Disposition:  Discharged to home     Allergies:  Allergies   Allergen Reactions     Amoxicillin      Hydrochlorothiazide W/Triamterene Other (See Comments)     Hyponatremia     Sertraline Other (See Comments)     Diarrhea        Condition on Discharge:  Discharge condition: Stable   Discharge vitals: Blood pressure 170/85, pulse 76, temperature 97.8  F (36.6  C), temperature source Oral, resp. rate 16, height 1.626 m (5' 4\"), weight 50.5 kg (111 lb 4.8 oz), SpO2 98 %.   Code status on discharge: Full Code     History of Illness:  See detailed admission note for full details.    Physical Exam:  Blood pressure 170/85, pulse 76, temperature 97.8  F (36.6  C), temperature source Oral, resp. rate 16, height 1.626 m (5' 4\"), weight 50.5 kg (111 lb 4.8 oz), SpO2 98 %.  Wt Readings from " Last 1 Encounters:   07/17/19 50.5 kg (111 lb 4.8 oz)     General: Alert, awake, no acute distress.  HEENT: Normocephalic, atraumatic, eyes anicteric and without scleral injection, EOMI, MMM.  Cardiac: RRR, normal S1, S2.  No m/g/r. No LE edema.  Pulmonary: Normal chest rise, normal work of breathing.  Lungs CTAB  Abdomen: soft, non-tender, non-distended.  Normoactive BS.  No guarding or rebound tenderness.  Extremities: no deformities.  Warm, well perfused.  Skin: no rashes or lesions noted.  Warm and Dry.  Neuro: No focal deficits noted.  Speech clear.  Coordination and strength grossly normal. Ambulates with walker  Psych: Appropriate affect. Alert to self, place, situation.    Procedures other than Imaging:  IVF     Imaging:  Results for orders placed or performed during the hospital encounter of 07/17/19   XR Chest 2 Views    Narrative    CHEST TWO VIEWS  7/17/2019 7:10 AM     HISTORY: Cough, right lower lobe wheezing    COMPARISON: None.      Impression    IMPRESSION: Hyperinflated lungs consistent with emphysematous changes.  Mild apical scarring. Minimal increased markings at the left lung base  and anterior inferior lungs on lateral film consistent with mild  lingular or middle lobe infiltrate. Follow-up to ensure complete  resolution. No evidence for pleural effusion. Heart and pulmonary  vessels within normal limits.    SHIVANI MASSEY MD        Consultations:  Consultations This Hospital Stay   PHYSICAL THERAPY ADULT IP CONSULT  CARE COORDINATOR IP CONSULT  SOCIAL WORK IP CONSULT  PHYSICAL THERAPY ADULT IP CONSULT     Recent Lab Results:  Recent Labs   Lab 07/20/19  0614 07/18/19  0557 07/17/19  0617   WBC 6.8 6.5 7.9   HGB 10.9* 11.8 12.1   HCT 33.0* 35.3 35.6   MCV 94 94 93   * 584* 608*     Recent Labs   Lab 07/20/19  0614 07/19/19  0621 07/18/19  0557   * 131* 130*   POTASSIUM 3.9 3.9 3.6   CHLORIDE 100 100 98   CO2 26 27 27   ANIONGAP 5 4 5   GLC 91 90 98   BUN 7 9 9   CR 0.41* 0.45*  0.44*   GFRESTIMATED >90 >90 >90   GFRESTBLACK >90 >90 >90   ANN-MARIE 8.6 8.6 9.1          Pending Results:    Unresulted Labs Ordered in the Past 30 Days of this Admission     No orders found from 6/17/2019 to 7/18/2019.           Discharge Instructions and Follow-Up:   Discharge Orders      Home Care PT Referral for Hospital Discharge      Home Care OT Referral for Hospital Discharge      Reason for your hospital stay    You were hospitalized for weakness and low blood pressure in the setting of having pneumonia.  You are improving and will have home therapies to continue to work on improving your strength.     Follow-up and recommended labs and tests     Follow up with primary care provider, Jessica Thompson, within 7 days for hospital follow- up.  The following labs/tests are recommended: BMP (to check your sodium).     Activity    Your activity upon discharge: activity as tolerated     MD face to face encounter    Documentation of Face to Face and Certification for Home Health Services    I certify that patient: David Hogan is under my care and that I, or a nurse practitioner or physician's assistant working with me, had a face-to-face encounter that meets the physician face-to-face encounter requirements with this patient on: 7/20/2019.    This encounter with the patient was in whole, or in part, for the following medical condition, which is the primary reason for home health care: PNA, weakness, dehydration.    I certify that, based on my findings, the following services are medically necessary home health services: Occupational Therapy and Physical Therapy.    My clinical findings support the need for the above services because: Occupational Therapy Services are needed to assess and treat cognitive ability and address ADL safety due to impairment in memory, cognition. and Physical Therapy Services are needed to assess and treat the following functional impairments: weakness, strength.    Further, I  certify that my clinical findings support that this patient is homebound (i.e. absences from home require considerable and taxing effort and are for medical reasons or [employer] services or infrequently or of short duration when for other reasons) because: Requires assistance of another person or specialized equipment to access medical services because patient: Is prone to wander/get lost without assistance...    Based on the above findings. I certify that this patient is confined to the home and needs intermittent skilled nursing care, physical therapy and/or speech therapy.  The patient is under my care, and I have initiated the establishment of the plan of care.  This patient will be followed by a physician who will periodically review the plan of care.  Physician/Provider to provide follow up care: Jessica Thompson    Attending hospital physician (the Medicare certified [place] provider): Amanda Paige  Physician Signature: See electronic signature associated with these discharge orders.  Date: 7/20/2019     Full Code     Diet    Follow this diet upon discharge: Orders Placed This Encounter      Regular Diet Adult     Discharge Medications   Current Discharge Medication List      START taking these medications    Details   order for DME Equipment being ordered: Walker Wheels () and Walker ()  Treatment Diagnosis: Impaired functional mobility  Qty: 1 each, Refills: 0    Associated Diagnoses: Generalized weakness         CONTINUE these medications which have NOT CHANGED    Details   alendronate (FOSAMAX) 70 MG tablet Take 70 mg by mouth once a week 30 minutes before food on Fridays.  Refills: 1         STOP taking these medications       doxycycline hyclate (VIBRAMYCIN) 100 MG capsule Comments:   Reason for Stopping:         losartan (COZAAR) 25 MG tablet Comments:   Reason for Stopping:               Time Spent on this Encounter   I, Amanda Paige, personally saw the patient today and spent greater  than 30 minutes discharging this patient.    Amanda Paige MD

## 2019-07-20 NOTE — PROGRESS NOTES
Discharge Planner   Discharge Plans in progress: Met with spouse and Pt following PT session today. Recommendations are to return home with home care. Adding OT with PT for support   Barriers to discharge plan: none anticipated. Spouse would like to speak with MD about how to manage low BP issues home home  Follow up plan: will update HC on d.c referral        Entered by: Corinne C. White 07/20/2019 11:26 AM

## 2019-07-20 NOTE — PLAN OF CARE
Discharge Planner PT   Patient plan for discharge: Home with assist of spouse and Home therapy  Current status: PT: Order received for re-evaluation as spouse concerned patient needing a TCU stay; prior recommendation was home with Home therapy; patient with low BP but not symptomatic; noted to have cognitive impairment and is very forgetful; oriented to self; difficulty following commands at times; impulsive but redirectable; able to perform bed mobility with min A and cues; spouse educated in how to best assist her; sit>stand with CGA to SBA and safety cues; use of walker; impulsively standing x 3 secondary to wanting to return to bed; able to ambulate 200 feet with use of walker and SBA; cues for proximity to walker and assist to manage IV pole; issued walker for discharge per spouse request and current patient need. Appears cognitive needs are > physical needs currently;Plans for home today with Home PT/OT; No significant changes from prior eval noted  Barriers to return to prior living situation: current need for 24/7 assist of spouse for all mobility and ADL's; impaired cognition (has assist of spouse for this)  Recommendations for discharge: 24/7 assist of spouse for all mobility and ADL's; Home PT/OT  Rationale for recommendations: Patient's cognitive concerns appear greater than physical needs; spouse has noticed some decline of memory; may benefit from further testing by Home OT; Home PT/OT safety evals; spouse with need to provide assist and safety cues for mobility/assist with cares as needed. Patient would benefit from ongoing PT for strengthening and maximize return of independence with all functional mobility       Entered by: Joseline Rothman 07/20/2019 11:32 AM

## 2019-07-20 NOTE — PLAN OF CARE
Patient's After Visit Summary was reviewed with patient and/or family and spouse.   Patient verbalized understanding of After Visit Summary, recommended follow up and was given an opportunity to ask questions.   Discharge medications sent home with patient/family: No, Not applicable   Discharged with spouse.  All personal belongings returned. IV access discontinued. Patient received walker for home from PT. Sent with patient. Escorted to exit via wheelchair.     Patient PASSED the Vehicle Transfer Screen    Passed:  Walker and Gait Belt/Mobility    Failed:  Care Transitions Notified No, Not applicable     Plan for discharge: Home with family or friend    Name and relation of person helping if known: YARI BERGER, spouse    Person completing Vehicle Transfer Screen: Kendra Zhang

## 2019-07-20 NOTE — PLAN OF CARE
"A&Ox2. Forgetful at times. Up w/ SBA, gait belt and walker. Ambulated in hallway multiple times, tolerated well. Stated she felt \"weak\" but denied dizziness. Ortho's positive. Encouraged fluid intake. C/o generalized weakness, relieved w/ po Tylenol prn. BS active x4, tolerating regular diet, passing flatus. Voiding in adequate amt.'s w/ some frequency. PT and Social Work following. SL.  remains at bedside. Plan is to discharge to home with spouse w/ home PT and OT. Will continue to monitor.       "

## 2020-08-11 ENCOUNTER — TRANSFERRED RECORDS (OUTPATIENT)
Dept: HEALTH INFORMATION MANAGEMENT | Facility: CLINIC | Age: 75
End: 2020-08-11

## 2020-08-11 ENCOUNTER — HOSPITAL ENCOUNTER (OUTPATIENT)
Dept: LAB | Facility: CLINIC | Age: 75
Discharge: HOME | End: 2020-08-11
Attending: PSYCHIATRY & NEUROLOGY | Admitting: PSYCHIATRY & NEUROLOGY
Payer: MEDICARE

## 2020-08-11 DIAGNOSIS — M54.50 LUMBAGO: Primary | ICD-10-CM

## 2020-08-11 DIAGNOSIS — R20.0 TACTILE ANESTHESIA: ICD-10-CM

## 2020-08-11 DIAGNOSIS — I95.1 ORTHOSTATIC HYPOTENSION: ICD-10-CM

## 2020-08-11 DIAGNOSIS — M54.16 LUMBAR RADICULOPATHY: ICD-10-CM

## 2020-08-11 DIAGNOSIS — M54.2 CERVICALGIA: ICD-10-CM

## 2020-08-11 DIAGNOSIS — R53.1 GENERALIZED WEAKNESS: ICD-10-CM

## 2020-08-11 LAB
ALT SERPL W P-5'-P-CCNC: 23 U/L (ref 0–50)
ANION GAP SERPL CALCULATED.3IONS-SCNC: 6 MMOL/L (ref 3–14)
AST SERPL W P-5'-P-CCNC: 18 U/L (ref 0–45)
BASOPHILS # BLD AUTO: 0.1 10E9/L (ref 0–0.2)
BASOPHILS NFR BLD AUTO: 1 %
BILIRUB DIRECT SERPL-MCNC: 0.3 MG/DL (ref 0–0.2)
BILIRUB SERPL-MCNC: 0.3 MG/DL (ref 0.2–1.3)
BUN SERPL-MCNC: 16 MG/DL (ref 7–30)
CALCIUM SERPL-MCNC: 9 MG/DL (ref 8.5–10.1)
CHLORIDE SERPL-SCNC: 97 MMOL/L (ref 94–109)
CK SERPL-CCNC: 61 U/L (ref 30–225)
CO2 SERPL-SCNC: 26 MMOL/L (ref 20–32)
CREAT SERPL-MCNC: 0.61 MG/DL (ref 0.52–1.04)
DIFFERENTIAL METHOD BLD: NORMAL
EOSINOPHIL # BLD AUTO: 0.2 10E9/L (ref 0–0.7)
EOSINOPHIL NFR BLD AUTO: 2.3 %
ERYTHROCYTE [DISTWIDTH] IN BLOOD BY AUTOMATED COUNT: 12.6 % (ref 10–15)
ERYTHROCYTE [SEDIMENTATION RATE] IN BLOOD BY WESTERGREN METHOD: 12 MM/H (ref 0–30)
GFR SERPL CREATININE-BSD FRML MDRD: 88 ML/MIN/{1.73_M2}
GLUCOSE SERPL-MCNC: 90 MG/DL (ref 70–99)
HCT VFR BLD AUTO: 39 % (ref 35–47)
HGB BLD-MCNC: 12.6 G/DL (ref 11.7–15.7)
IMM GRANULOCYTES # BLD: 0 10E9/L (ref 0–0.4)
IMM GRANULOCYTES NFR BLD: 0.4 %
LYMPHOCYTES # BLD AUTO: 2.2 10E9/L (ref 0.8–5.3)
LYMPHOCYTES NFR BLD AUTO: 26.9 %
MCH RBC QN AUTO: 31 PG (ref 26.5–33)
MCHC RBC AUTO-ENTMCNC: 32.3 G/DL (ref 31.5–36.5)
MCV RBC AUTO: 96 FL (ref 78–100)
MISCELLANEOUS TEST: NORMAL
MONOCYTES # BLD AUTO: 0.6 10E9/L (ref 0–1.3)
MONOCYTES NFR BLD AUTO: 7.9 %
NEUTROPHILS # BLD AUTO: 4.9 10E9/L (ref 1.6–8.3)
NEUTROPHILS NFR BLD AUTO: 61.5 %
NRBC # BLD AUTO: 0 10*3/UL
NRBC BLD AUTO-RTO: 0 /100
PLATELET # BLD AUTO: 350 10E9/L (ref 150–450)
POTASSIUM SERPL-SCNC: 4.1 MMOL/L (ref 3.4–5.3)
RBC # BLD AUTO: 4.07 10E12/L (ref 3.8–5.2)
SODIUM SERPL-SCNC: 129 MMOL/L (ref 133–144)
WBC # BLD AUTO: 8 10E9/L (ref 4–11)

## 2020-08-11 PROCEDURE — 83520 IMMUNOASSAY QUANT NOS NONAB: CPT | Performed by: PSYCHIATRY & NEUROLOGY

## 2020-08-11 PROCEDURE — 86235 NUCLEAR ANTIGEN ANTIBODY: CPT | Performed by: PSYCHIATRY & NEUROLOGY

## 2020-08-11 PROCEDURE — 84460 ALANINE AMINO (ALT) (SGPT): CPT | Performed by: PSYCHIATRY & NEUROLOGY

## 2020-08-11 PROCEDURE — 86038 ANTINUCLEAR ANTIBODIES: CPT | Performed by: PSYCHIATRY & NEUROLOGY

## 2020-08-11 PROCEDURE — 82085 ASSAY OF ALDOLASE: CPT | Performed by: PSYCHIATRY & NEUROLOGY

## 2020-08-11 PROCEDURE — 84450 TRANSFERASE (AST) (SGOT): CPT | Performed by: PSYCHIATRY & NEUROLOGY

## 2020-08-11 PROCEDURE — 83516 IMMUNOASSAY NONANTIBODY: CPT | Mod: 91 | Performed by: PSYCHIATRY & NEUROLOGY

## 2020-08-11 PROCEDURE — 84999 UNLISTED CHEMISTRY PROCEDURE: CPT | Performed by: PSYCHIATRY & NEUROLOGY

## 2020-08-11 PROCEDURE — 82248 BILIRUBIN DIRECT: CPT | Performed by: PSYCHIATRY & NEUROLOGY

## 2020-08-11 PROCEDURE — 80048 BASIC METABOLIC PNL TOTAL CA: CPT | Performed by: PSYCHIATRY & NEUROLOGY

## 2020-08-11 PROCEDURE — 85652 RBC SED RATE AUTOMATED: CPT | Performed by: PSYCHIATRY & NEUROLOGY

## 2020-08-11 PROCEDURE — 36415 COLL VENOUS BLD VENIPUNCTURE: CPT | Performed by: PSYCHIATRY & NEUROLOGY

## 2020-08-11 PROCEDURE — 82247 BILIRUBIN TOTAL: CPT | Performed by: PSYCHIATRY & NEUROLOGY

## 2020-08-11 PROCEDURE — 82550 ASSAY OF CK (CPK): CPT | Performed by: PSYCHIATRY & NEUROLOGY

## 2020-08-11 PROCEDURE — 85025 COMPLETE CBC W/AUTO DIFF WBC: CPT | Performed by: PSYCHIATRY & NEUROLOGY

## 2020-08-12 LAB
ALDOLASE SERPL-CCNC: 2 U/L (ref 1.5–8.1)
ANA SER QL IF: NEGATIVE

## 2020-08-28 ENCOUNTER — MEDICAL CORRESPONDENCE (OUTPATIENT)
Dept: HEALTH INFORMATION MANAGEMENT | Facility: CLINIC | Age: 75
End: 2020-08-28

## 2020-08-28 LAB — LAB SCANNED RESULT: NORMAL

## 2020-08-31 DIAGNOSIS — G72.9 MYOPATHY, UNSPECIFIED: ICD-10-CM

## 2020-08-31 DIAGNOSIS — R53.1 WEAKNESS: Primary | ICD-10-CM

## 2020-08-31 DIAGNOSIS — R94.131 ELECTROMYOGRAM (EMG) ABNORMAL: ICD-10-CM

## 2020-08-31 DIAGNOSIS — Z11.59 ENCOUNTER FOR SCREENING FOR OTHER VIRAL DISEASES: Primary | ICD-10-CM

## 2020-09-06 DIAGNOSIS — Z11.59 ENCOUNTER FOR SCREENING FOR OTHER VIRAL DISEASES: ICD-10-CM

## 2020-09-06 PROCEDURE — U0003 INFECTIOUS AGENT DETECTION BY NUCLEIC ACID (DNA OR RNA); SEVERE ACUTE RESPIRATORY SYNDROME CORONAVIRUS 2 (SARS-COV-2) (CORONAVIRUS DISEASE [COVID-19]), AMPLIFIED PROBE TECHNIQUE, MAKING USE OF HIGH THROUGHPUT TECHNOLOGIES AS DESCRIBED BY CMS-2020-01-R: HCPCS | Performed by: SURGERY

## 2020-09-07 LAB
SARS-COV-2 RNA SPEC QL NAA+PROBE: NOT DETECTED
SPECIMEN SOURCE: NORMAL

## 2020-09-09 ENCOUNTER — TELEPHONE (OUTPATIENT)
Dept: OTHER | Facility: CLINIC | Age: 75
End: 2020-09-09

## 2020-09-09 RX ORDER — CLINDAMYCIN PHOSPHATE 900 MG/50ML
900 INJECTION, SOLUTION INTRAVENOUS
Status: CANCELLED | OUTPATIENT
Start: 2020-09-09

## 2020-09-10 ENCOUNTER — APPOINTMENT (OUTPATIENT)
Dept: SURGERY | Facility: PHYSICIAN GROUP | Age: 75
End: 2020-09-10
Payer: MEDICARE

## 2020-09-10 ENCOUNTER — HOSPITAL ENCOUNTER (OUTPATIENT)
Facility: CLINIC | Age: 75
Discharge: HOME | End: 2020-09-10
Attending: SURGERY | Admitting: SURGERY
Payer: MEDICARE

## 2020-09-10 VITALS
SYSTOLIC BLOOD PRESSURE: 133 MMHG | HEIGHT: 64 IN | WEIGHT: 135.4 LBS | OXYGEN SATURATION: 100 % | RESPIRATION RATE: 16 BRPM | TEMPERATURE: 98 F | BODY MASS INDEX: 23.12 KG/M2 | DIASTOLIC BLOOD PRESSURE: 74 MMHG

## 2020-09-10 DIAGNOSIS — G72.9 MYOPATHY, UNSPECIFIED: ICD-10-CM

## 2020-09-10 DIAGNOSIS — R94.131 ELECTROMYOGRAM (EMG) ABNORMAL: ICD-10-CM

## 2020-09-10 DIAGNOSIS — R53.1 WEAKNESS: ICD-10-CM

## 2020-09-10 LAB
COPATH REPORT: NORMAL
SEE SCANNED REPORT: NORMAL

## 2020-09-10 PROCEDURE — 71000012 ZZH RECOVERY PHASE 1 LEVEL 1 FIRST HR: Performed by: SURGERY

## 2020-09-10 PROCEDURE — 36000050 ZZH SURGERY LEVEL 2 1ST 30 MIN: Performed by: SURGERY

## 2020-09-10 PROCEDURE — 88305 TISSUE EXAM BY PATHOLOGIST: CPT | Performed by: SURGERY

## 2020-09-10 PROCEDURE — 88319 ENZYME HISTOCHEMISTRY: CPT | Performed by: SURGERY

## 2020-09-10 PROCEDURE — 40000169 ZZH STATISTIC PRE-PROCEDURE ASSESSMENT I: Performed by: SURGERY

## 2020-09-10 PROCEDURE — 00000159 ZZHCL STATISTIC H-SEND OUTS PREP: Performed by: SURGERY

## 2020-09-10 PROCEDURE — 27210794 ZZH OR GENERAL SUPPLY STERILE: Performed by: SURGERY

## 2020-09-10 PROCEDURE — 88348 ELECTRON MICROSCOPY DX: CPT | Performed by: SURGERY

## 2020-09-10 PROCEDURE — 88313 SPECIAL STAINS GROUP 2: CPT | Performed by: SURGERY

## 2020-09-10 PROCEDURE — 25000125 ZZHC RX 250: Performed by: SURGERY

## 2020-09-10 PROCEDURE — 36000052 ZZH SURGERY LEVEL 2 EA 15 ADDTL MIN: Performed by: SURGERY

## 2020-09-10 RX ORDER — ACETAMINOPHEN 325 MG/1
325-650 TABLET ORAL EVERY 6 HOURS PRN
Status: ON HOLD | COMMUNITY
End: 2020-10-10

## 2020-09-10 RX ORDER — BUPIVACAINE HYDROCHLORIDE AND EPINEPHRINE 2.5; 5 MG/ML; UG/ML
INJECTION, SOLUTION EPIDURAL; INFILTRATION; INTRACAUDAL; PERINEURAL
Status: DISCONTINUED
Start: 2020-09-10 | End: 2020-09-10 | Stop reason: HOSPADM

## 2020-09-10 RX ORDER — LIDOCAINE HYDROCHLORIDE 10 MG/ML
INJECTION, SOLUTION INFILTRATION; PERINEURAL
Status: DISCONTINUED
Start: 2020-09-10 | End: 2020-09-10 | Stop reason: HOSPADM

## 2020-09-10 RX ORDER — MAGNESIUM HYDROXIDE 1200 MG/15ML
LIQUID ORAL PRN
Status: DISCONTINUED | OUTPATIENT
Start: 2020-09-10 | End: 2020-09-10 | Stop reason: HOSPADM

## 2020-09-10 RX ORDER — SWAB
1 SWAB, NON-MEDICATED MISCELLANEOUS DAILY
Status: ON HOLD | COMMUNITY
End: 2020-10-10

## 2020-09-10 RX ORDER — BUPIVACAINE HYDROCHLORIDE AND EPINEPHRINE 2.5; 5 MG/ML; UG/ML
INJECTION, SOLUTION INFILTRATION; PERINEURAL PRN
Status: DISCONTINUED | OUTPATIENT
Start: 2020-09-10 | End: 2020-09-10 | Stop reason: HOSPADM

## 2020-09-10 RX ORDER — OMEGA-3 FATTY ACIDS/FISH OIL 300-1000MG
200 CAPSULE ORAL 3 TIMES DAILY
Status: ON HOLD | COMMUNITY
End: 2020-10-14

## 2020-09-10 ASSESSMENT — MIFFLIN-ST. JEOR: SCORE: 1089.17

## 2020-09-10 NOTE — OR NURSING
PNDS met, po per I&O sheet. Pt dressed, up in recliner and transported to Phase 2.   Discharge instructions read and given to family.

## 2020-09-10 NOTE — BRIEF OP NOTE
[facility]    Brief Operative Note    Pre-operative diagnosis: Weakness [R53.1]  Electromyogram (EMG) abnormal [R94.131]  Myopathy, unspecified [G72.9]  Post-operative diagnosis Same as pre-operative diagnosis    Procedure: Procedure(s):  LEFT QUAD MUSCLE BIOPSY  Surgeon: Surgeon(s) and Role:     * Yazan Chanel MD - Primary     * Micah Esteves MD - Assisting  Anesthesia: Local   Estimated blood loss: Less than 10 ml  Drains: None  Specimens:   ID Type Source Tests Collected by Time Destination   A : Left quad muscle biopsy Tissue Leg, left SURGICAL PATHOLOGY EXAM Yazan Chanel MD 9/10/2020  7:49 AM      Findings:   Muscle sent for biopsy and physiology studies, appeared grossly normal   Complications: None.  Implants: * No implants in log *

## 2020-09-10 NOTE — OP NOTE
Procedure Date: 09/10/2020      PREOPERATIVE DIAGNOSIS:  Muscle weakness and pain.      POSTOPERATIVE DIAGNOSIS:  Muscle weakness and pain.      PROCEDURE:  Excisional left quadriceps muscle biopsy.      SURGEON:  Yazan Chanel MD      FIRST ASSISTANT:  Micah Esteves ([facility] resident).      ANESTHESIA:  Local.      PREOPERATIVE MEDICATIONS:  None.      INDICATIONS:  A 75-year-old patient who has had progressive primarily leg muscle weakness along with discomfort.  She has been evaluated by Dr. Shubham Cagle who requested a left quadriceps muscle biopsy.  Risks and benefits discussed with the patient and her  preoperatively.      DESCRIPTION OF PROCEDURE:  The patient was brought to the operating room.  The left anterior thigh was prepped and draped.  A timeout was called and the sites were identified.  1% lidocaine was injected in a field block fashion to the skin and subcutaneous, but avoiding the fascia and muscle.  A 3 cm vertical incision was made with a #15 blade scalpel over the proximal one-third of the anterior thigh.  Electrocautery was used for hemostasis.  Retractors were placed and we opened the quadriceps fascia with the Metzenbaum scissors.  We then removed 2 segments of clinically normal muscle, transecting this with the Metzenbaum scissors proximally and distally.  Wounds were then infiltrated with 0.5% Marcaine.  The fascia was closed with interrupted 3-0 Vicryl.  Subcutaneous tissue was closed in layers with interrupted 3-0 Vicryl and the skin was closed with 4-0 Vicryl in a subcuticular fashion, followed by Steri-Strips, gauze and Tegaderm dressing.      On the back table, the first specimen was placed in a sterile tongue blade and secured in a resting normal length with 2-0 silk suture and placed within the EM fixative.  Two other specimens were placed on a sterile tongue blade followed by sterile 4 x 4s saturated with normal saline and placed within a urine cup on wet ice.  Both  these specimens were delivered personally by myself to the laboratory for appropriate transport to [facility] for further evaluation.      The patient tolerated the procedure well.      ESTIMATED BLOOD LOSS:  Less than 2 mL      COMPLICATIONS:  None.         YAZAN DE LA CRUZ MD             D: 09/10/2020   T: 09/10/2020   MT: SANGEETA      Name:     GEORGES BERGER   MRN:      [id number]        Account:        [id number]   :      1945           Procedure Date: 09/10/2020      Document: [id number]       cc: Yazan De La Cruz MD

## 2020-09-10 NOTE — DISCHARGE INSTRUCTIONS
Reasons to contact your surgeon:    1. Signs of possible infection: Check your incision daily for redness, swelling, warmth, red streaks or foul drainage.   2. Elevated temperature.  3. Pain not controlled with pain medication and/or rest.   4. Uncontrolled nausea or vomiting.  5. Any questions or concerns.      **If you have questions or concerns about your procedure  call Dr Yazan Chanel at [phone]**

## 2020-10-06 ENCOUNTER — HOSPITAL ENCOUNTER (OUTPATIENT)
Dept: LAB | Facility: CLINIC | Age: 75
End: 2020-10-06
Attending: PSYCHIATRY & NEUROLOGY
Payer: MEDICARE

## 2020-10-08 ENCOUNTER — HOSPITAL ENCOUNTER (OUTPATIENT)
Dept: LAB | Facility: CLINIC | Age: 75
Discharge: HOME | DRG: 481 | End: 2020-10-08
Attending: PSYCHIATRY & NEUROLOGY | Admitting: PSYCHIATRY & NEUROLOGY
Payer: MEDICARE

## 2020-10-08 DIAGNOSIS — G72.9 MYOPATHY, UNSPECIFIED: ICD-10-CM

## 2020-10-08 DIAGNOSIS — G71.11 MYOTONIC MUSCULAR DYSTROPHY (H): Primary | ICD-10-CM

## 2020-10-08 DIAGNOSIS — R53.1 WEAKNESS: ICD-10-CM

## 2020-10-08 DIAGNOSIS — R53.1 GENERALIZED WEAKNESS: ICD-10-CM

## 2020-10-08 PROCEDURE — 36415 COLL VENOUS BLD VENIPUNCTURE: CPT | Performed by: PSYCHIATRY & NEUROLOGY

## 2020-10-08 PROCEDURE — 81187 CNBP GENE DETC ABNOR ALLELE: CPT | Performed by: PSYCHIATRY & NEUROLOGY

## 2020-10-10 ENCOUNTER — APPOINTMENT (OUTPATIENT)
Dept: GENERAL RADIOLOGY | Facility: CLINIC | Age: 75
DRG: 481 | End: 2020-10-10
Attending: EMERGENCY MEDICINE
Payer: MEDICARE

## 2020-10-10 ENCOUNTER — HOSPITAL ENCOUNTER (INPATIENT)
Facility: CLINIC | Age: 75
LOS: 4 days | Discharge: NURSING, REHAB OR LONG-TERM CARE | DRG: 481 | End: 2020-10-14
Attending: EMERGENCY MEDICINE | Admitting: INTERNAL MEDICINE
Payer: MEDICARE

## 2020-10-10 DIAGNOSIS — S72.145A CLOSED NONDISPLACED INTERTROCHANTERIC FRACTURE OF LEFT FEMUR, INITIAL ENCOUNTER (H): ICD-10-CM

## 2020-10-10 DIAGNOSIS — M25.552 HIP PAIN, LEFT: ICD-10-CM
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
## 2020-10-10 LAB
ABO + RH BLD: NORMAL
ABO + RH BLD: NORMAL
ANION GAP SERPL CALCULATED.3IONS-SCNC: 7 MMOL/L (ref 3–14)
APTT PPP: 28 SEC (ref 22–37)
BASOPHILS # BLD AUTO: 0 10E9/L (ref 0–0.2)
BASOPHILS NFR BLD AUTO: 0.5 %
BLD GP AB SCN SERPL QL: NORMAL
BLOOD BANK CMNT PATIENT-IMP: NORMAL
BUN SERPL-MCNC: 13 MG/DL (ref 7–30)
CALCIUM SERPL-MCNC: 8.7 MG/DL (ref 8.5–10.1)
CHLORIDE SERPL-SCNC: 99 MMOL/L (ref 94–109)
CO2 SERPL-SCNC: 25 MMOL/L (ref 20–32)
CREAT SERPL-MCNC: 0.64 MG/DL (ref 0.52–1.04)
DIFFERENTIAL METHOD BLD: ABNORMAL
EOSINOPHIL # BLD AUTO: 0.1 10E9/L (ref 0–0.7)
EOSINOPHIL NFR BLD AUTO: 0.7 %
ERYTHROCYTE [DISTWIDTH] IN BLOOD BY AUTOMATED COUNT: 13 % (ref 10–15)
GFR SERPL CREATININE-BSD FRML MDRD: 86 ML/MIN/{1.73_M2}
GLUCOSE SERPL-MCNC: 93 MG/DL (ref 70–99)
HCT VFR BLD AUTO: 36.8 % (ref 35–47)
HGB BLD-MCNC: 11.6 G/DL (ref 11.7–15.7)
IMM GRANULOCYTES # BLD: 0.1 10E9/L (ref 0–0.4)
IMM GRANULOCYTES NFR BLD: 0.6 %
INR PPP: 0.95 (ref 0.86–1.14)
LABORATORY COMMENT REPORT: NORMAL
LYMPHOCYTES # BLD AUTO: 0.9 10E9/L (ref 0.8–5.3)
LYMPHOCYTES NFR BLD AUTO: 11.2 %
MCH RBC QN AUTO: 30.4 PG (ref 26.5–33)
MCHC RBC AUTO-ENTMCNC: 31.5 G/DL (ref 31.5–36.5)
MCV RBC AUTO: 97 FL (ref 78–100)
MONOCYTES # BLD AUTO: 0.3 10E9/L (ref 0–1.3)
MONOCYTES NFR BLD AUTO: 3.1 %
NEUTROPHILS # BLD AUTO: 6.8 10E9/L (ref 1.6–8.3)
NEUTROPHILS NFR BLD AUTO: 83.9 %
NRBC # BLD AUTO: 0 10*3/UL
NRBC BLD AUTO-RTO: 0 /100
PLATELET # BLD AUTO: 360 10E9/L (ref 150–450)
POTASSIUM SERPL-SCNC: 5 MMOL/L (ref 3.4–5.3)
RBC # BLD AUTO: 3.81 10E12/L (ref 3.8–5.2)
SARS-COV-2 RNA SPEC QL NAA+PROBE: NEGATIVE
SARS-COV-2 RNA SPEC QL NAA+PROBE: NORMAL
SODIUM SERPL-SCNC: 131 MMOL/L (ref 133–144)
SODIUM UR-SCNC: 90 MMOL/L
SPECIMEN EXP DATE BLD: NORMAL
SPECIMEN SOURCE: NORMAL
SPECIMEN SOURCE: NORMAL
WBC # BLD AUTO: 8.1 10E9/L (ref 4–11)

## 2020-10-10 PROCEDURE — 86900 BLOOD TYPING SEROLOGIC ABO: CPT | Performed by: EMERGENCY MEDICINE

## 2020-10-10 PROCEDURE — 73552 X-RAY EXAM OF FEMUR 2/>: CPT | Mod: LT

## 2020-10-10 PROCEDURE — 120N000001 HC R&B MED SURG/OB

## 2020-10-10 PROCEDURE — 84300 ASSAY OF URINE SODIUM: CPT | Performed by: PHYSICIAN ASSISTANT

## 2020-10-10 PROCEDURE — 258N000003 HC RX IP 258 OP 636: Performed by: INTERNAL MEDICINE

## 2020-10-10 PROCEDURE — 86901 BLOOD TYPING SEROLOGIC RH(D): CPT | Performed by: EMERGENCY MEDICINE

## 2020-10-10 PROCEDURE — 80048 BASIC METABOLIC PNL TOTAL CA: CPT | Performed by: EMERGENCY MEDICINE

## 2020-10-10 PROCEDURE — 93005 ELECTROCARDIOGRAM TRACING: CPT

## 2020-10-10 PROCEDURE — 85610 PROTHROMBIN TIME: CPT | Performed by: EMERGENCY MEDICINE

## 2020-10-10 PROCEDURE — U0003 INFECTIOUS AGENT DETECTION BY NUCLEIC ACID (DNA OR RNA); SEVERE ACUTE RESPIRATORY SYNDROME CORONAVIRUS 2 (SARS-COV-2) (CORONAVIRUS DISEASE [COVID-19]), AMPLIFIED PROBE TECHNIQUE, MAKING USE OF HIGH THROUGHPUT TECHNOLOGIES AS DESCRIBED BY CMS-2020-01-R: HCPCS | Performed by: EMERGENCY MEDICINE

## 2020-10-10 PROCEDURE — 85730 THROMBOPLASTIN TIME PARTIAL: CPT | Performed by: EMERGENCY MEDICINE

## 2020-10-10 PROCEDURE — 83930 ASSAY OF BLOOD OSMOLALITY: CPT | Performed by: INTERNAL MEDICINE

## 2020-10-10 PROCEDURE — 250N000013 HC RX MED GY IP 250 OP 250 PS 637: Performed by: PHYSICIAN ASSISTANT

## 2020-10-10 PROCEDURE — 96374 THER/PROPH/DIAG INJ IV PUSH: CPT

## 2020-10-10 PROCEDURE — 36415 COLL VENOUS BLD VENIPUNCTURE: CPT | Performed by: EMERGENCY MEDICINE

## 2020-10-10 PROCEDURE — 85025 COMPLETE CBC W/AUTO DIFF WBC: CPT | Performed by: EMERGENCY MEDICINE

## 2020-10-10 PROCEDURE — 72170 X-RAY EXAM OF PELVIS: CPT

## 2020-10-10 PROCEDURE — 250N000011 HC RX IP 250 OP 636: Performed by: EMERGENCY MEDICINE

## 2020-10-10 PROCEDURE — 99285 EMERGENCY DEPT VISIT HI MDM: CPT | Mod: 25

## 2020-10-10 PROCEDURE — 250N000011 HC RX IP 250 OP 636: Performed by: PHYSICIAN ASSISTANT

## 2020-10-10 PROCEDURE — 99223 1ST HOSP IP/OBS HIGH 75: CPT | Mod: AI | Performed by: PHYSICIAN ASSISTANT

## 2020-10-10 PROCEDURE — 86850 RBC ANTIBODY SCREEN: CPT | Performed by: EMERGENCY MEDICINE

## 2020-10-10 PROCEDURE — C9803 HOPD COVID-19 SPEC COLLECT: HCPCS

## 2020-10-10 RX ORDER — LOSARTAN POTASSIUM 25 MG/1
12.5 TABLET ORAL DAILY PRN
Status: ON HOLD | COMMUNITY
End: 2023-11-11

## 2020-10-10 RX ORDER — MEXILETINE HYDROCHLORIDE 150 MG/1
150 CAPSULE ORAL 2 TIMES DAILY
Status: ON HOLD | COMMUNITY
End: 2023-11-11

## 2020-10-10 RX ORDER — POLYETHYLENE GLYCOL 3350 17 G/17G
17 POWDER, FOR SOLUTION ORAL DAILY PRN
Status: DISCONTINUED | OUTPATIENT
Start: 2020-10-10 | End: 2020-10-14 | Stop reason: HOSPADM

## 2020-10-10 RX ORDER — HYDROMORPHONE HYDROCHLORIDE 1 MG/ML
0.2 INJECTION, SOLUTION INTRAMUSCULAR; INTRAVENOUS; SUBCUTANEOUS
Status: COMPLETED | OUTPATIENT
Start: 2020-10-10 | End: 2020-10-10

## 2020-10-10 RX ORDER — HYDROMORPHONE HYDROCHLORIDE 1 MG/ML
0.2 INJECTION, SOLUTION INTRAMUSCULAR; INTRAVENOUS; SUBCUTANEOUS
Status: DISCONTINUED | OUTPATIENT
Start: 2020-10-10 | End: 2020-10-12

## 2020-10-10 RX ORDER — MULTIVIT-MIN/IRON/FOLIC ACID/K 18-600-40
2 CAPSULE ORAL DAILY
Status: ON HOLD | COMMUNITY
End: 2023-11-11

## 2020-10-10 RX ORDER — SODIUM CHLORIDE 9 MG/ML
INJECTION, SOLUTION INTRAVENOUS CONTINUOUS
Status: DISCONTINUED | OUTPATIENT
Start: 2020-10-11 | End: 2020-10-14

## 2020-10-10 RX ORDER — ONDANSETRON 4 MG/1
4 TABLET, ORALLY DISINTEGRATING ORAL EVERY 6 HOURS PRN
Status: DISCONTINUED | OUTPATIENT
Start: 2020-10-10 | End: 2020-10-11

## 2020-10-10 RX ORDER — LIDOCAINE 40 MG/G
CREAM TOPICAL
Status: DISCONTINUED | OUTPATIENT
Start: 2020-10-10 | End: 2020-10-14 | Stop reason: HOSPADM

## 2020-10-10 RX ORDER — KETOROLAC TROMETHAMINE 15 MG/ML
15 INJECTION, SOLUTION INTRAMUSCULAR; INTRAVENOUS EVERY 6 HOURS PRN
Status: COMPLETED | OUTPATIENT
Start: 2020-10-10 | End: 2020-10-10

## 2020-10-10 RX ORDER — OXYCODONE HYDROCHLORIDE 5 MG/1
5-10 TABLET ORAL
Status: DISCONTINUED | OUTPATIENT
Start: 2020-10-10 | End: 2020-10-13

## 2020-10-10 RX ORDER — ONDANSETRON 2 MG/ML
4 INJECTION INTRAMUSCULAR; INTRAVENOUS EVERY 6 HOURS PRN
Status: DISCONTINUED | OUTPATIENT
Start: 2020-10-10 | End: 2020-10-11

## 2020-10-10 RX ORDER — ACETAMINOPHEN 325 MG/1
650 TABLET ORAL EVERY 4 HOURS PRN
Status: DISCONTINUED | OUTPATIENT
Start: 2020-10-10 | End: 2020-10-11

## 2020-10-10 RX ORDER — HYDRALAZINE HYDROCHLORIDE 20 MG/ML
10 INJECTION INTRAMUSCULAR; INTRAVENOUS EVERY 4 HOURS PRN
Status: DISCONTINUED | OUTPATIENT
Start: 2020-10-10 | End: 2020-10-14 | Stop reason: HOSPADM

## 2020-10-10 RX ORDER — OXYBUTYNIN CHLORIDE 5 MG/1
5 TABLET, EXTENDED RELEASE ORAL AT BEDTIME
Status: ON HOLD | COMMUNITY
End: 2023-11-11

## 2020-10-10 RX ORDER — ACETAMINOPHEN 500 MG
500 TABLET ORAL 3 TIMES DAILY
Status: ON HOLD | COMMUNITY
End: 2023-11-13

## 2020-10-10 RX ORDER — PREDNISONE 5 MG/1
25 TABLET ORAL
COMMUNITY

## 2020-10-10 RX ORDER — NALOXONE HYDROCHLORIDE 0.4 MG/ML
.1-.4 INJECTION, SOLUTION INTRAMUSCULAR; INTRAVENOUS; SUBCUTANEOUS
Status: DISCONTINUED | OUTPATIENT
Start: 2020-10-10 | End: 2020-10-14 | Stop reason: HOSPADM

## 2020-10-10 RX ADMIN — HYDROMORPHONE HYDROCHLORIDE 0.2 MG: 1 INJECTION, SOLUTION INTRAMUSCULAR; INTRAVENOUS; SUBCUTANEOUS at 18:33

## 2020-10-10 RX ADMIN — HYDROMORPHONE HYDROCHLORIDE 0.2 MG: 1 INJECTION, SOLUTION INTRAMUSCULAR; INTRAVENOUS; SUBCUTANEOUS at 11:58

## 2020-10-10 RX ADMIN — ACETAMINOPHEN 650 MG: 325 TABLET, FILM COATED ORAL at 16:51

## 2020-10-10 RX ADMIN — OXYCODONE HYDROCHLORIDE 5 MG: 5 TABLET ORAL at 15:52

## 2020-10-10 RX ADMIN — SODIUM CHLORIDE: 9 INJECTION, SOLUTION INTRAVENOUS at 23:58

## 2020-10-10 RX ADMIN — HYDROMORPHONE HYDROCHLORIDE 0.2 MG: 1 INJECTION, SOLUTION INTRAMUSCULAR; INTRAVENOUS; SUBCUTANEOUS at 21:37

## 2020-10-10 RX ADMIN — OXYCODONE HYDROCHLORIDE 5 MG: 5 TABLET ORAL at 16:51

## 2020-10-10 RX ADMIN — KETOROLAC TROMETHAMINE 15 MG: 15 INJECTION, SOLUTION INTRAMUSCULAR; INTRAVENOUS at 19:41

## 2020-10-10 ASSESSMENT — ACTIVITIES OF DAILY LIVING (ADL)
ADLS_ACUITY_SCORE: 19
ADLS_ACUITY_SCORE: 17

## 2020-10-10 NOTE — ED PROVIDER NOTES
"  History     Chief Complaint:  Fall      The history is provided by the patient.      David Hogan is a 75 year old female who presents with fall.  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  Today she was walking and her sock caught and she fell onto her left side as she was pivoting to sit into a chair.  She denies hitting her head.  Denies loss of consciousness.  Denies any preceding chest pain, shortness of breath, weakness, numbness or tingling.  This appears to be secondary to her ongoing issues with her thighs.  They report that her thighs tend to \"lock up\".  This seems to be a likely ongoing issue.  She denies any other injuries besides her left hip hurting.    Allergies:  Amoxicillin  Hydrochlorothiazide w/ Triamterene  Sertraline    Medications:    Alendronate  Vitamin D    Past Medical History:    Hypertension  Weakness    Past Surgical History:    Back surgery  Left quadricept muscle biopsy    Family History:    History reviewed. No pertinent family history.     Social History:  The patient was accompanied to the ED by .  Smoking Status: Current every day smoker - 0.25 packs per day  Smokeless Tobacco: Never used  Alcohol Use: Not currently  Drug Use: No  Marital Status:      Review of Systems   All other systems reviewed and are negative.    Physical Exam     Patient Vitals for the past 24 hrs:   BP Temp Temp src Pulse Resp SpO2   10/10/20 1118 (!) 150/82 -- -- -- -- --   10/10/20 1117 -- 97.7  F (36.5  C) Oral 72 16 93 %       Physical Exam  General: Resting on the bed.  Head: No obvious trauma to head.  Ears, Nose, Throat:  External ears normal.  Nose normal.  Symmetric face.  Clear TMs.  Eyes:  Conjunctivae clear.  Pupils are equal, round, and reactive. Glasses in place.  Neck: Normal range of motion.  Neck supple.   CV: Regular rate and rhythm.  No murmurs.    2+ DP and radial pulses.  Respiratory: Effort normal and breath sounds normal.  No " wheezing or crackles.   Gastrointestinal: Soft.  No distension. There is no tenderness.   Musculoskeletal: Nontender cervical, thoracic, or lumbar spine without step-off or deformity.  Left hip with tenderness to palpation, left leg is shortened and externally rotated.  Neuro: Alert. Moving all extremities appropriately.  Normal speech.    Skin: Skin is warm and dry.  No rash noted.     Emergency Department Course     ECG:  Indication: Fall  Completed at 1150.  Read at 1157.   Sinus rhythm with short WA   Rate 72 bpm. WA interval 100. QRS duration 90. QT/QTc 384/420. P-R-T axes 71 65 72.  No significant change when compared to EKG dated 7/17/2019.  Agree with computer interpretation.     Imaging:  Radiology findings were communicated with the patient who voiced understanding of the findings.    XR Femur Left 2 Views:  1.  Left femur intertrochanteric fracture with mild anterior apex  angulation.  2.  No additional fractures are evident.  3.  Normal left hip and knee alignment.  4.  Bone demineralization.  Report per radiology.    XR Pelvis 1/2 Views:  1.  Nondisplaced intertrochanteric fracture of the left femur.  2.  No additional fractures are evident.  3.  Normal left hip joint spacing and alignment.  4.  Postoperative and degenerative changes in the lower lumbar spine.  Moderate right hip degenerative arthrosis. Bone demineralization.  Report per radiology.    Laboratory:  Laboratory findings were communicated with the patient who voiced understanding of the findings.    COVID-19 Virus (Coronavirus) by Nasopharyngeal (NP) Swab: Pending    Labs Ordered and Resulted from Time of ED Arrival Up to the Time of Departure from the ED   CBC WITH PLATELETS DIFFERENTIAL - Abnormal; Notable for the following components:       Result Value    Hemoglobin 11.6 (*)     All other components within normal limits   BASIC METABOLIC PANEL - Abnormal; Notable for the following components:    Sodium 131 (*)     All other components  within normal limits   COVID-19 VIRUS (CORONAVIRUS) BY PCR   INR   PARTIAL THROMBOPLASTIN TIME   PERIPHERAL IV CATHETER   ABO/RH TYPE AND SCREEN     Procedures  None.    Interventions:  1158 Dilaudid 0.2 mg IV    Emergency Department Course:  Past medical records, nursing notes, and vitals reviewed.    1128 I performed an exam of the patient as documented above.     EKG obtained in the ED, see results above.     IV was inserted and blood was drawn for laboratory testing, results above.    The patient was sent for X-rays of the pelvis and left femur while in the emergency department, results above.     The patient was swabbed for COVID-19, noted above.    1230 I rechecked the patient and discussed the results of her workup thus far.     1237 I spoke with Dr. Feliciano of the orthopedics service regarding patient's presentation, findings, and plan of care.    1328 I spoke with Hayden CENTENO for Dr. Keita of the hospitalist service regarding patient's presentation, findings, and plan of care.    Findings and plan explained to the Patient who consents to admission. Discussed the patient with Dr. Keita, who will admit the patient to a medical bed for further monitoring, evaluation, and treatment.    I personally reviewed the laboratory and imaging results with the Patient and answered all related questions prior to admission.     Impression & Plan     Covid-19  David Hogan was evaluated during a global COVID-19 pandemic, which necessitated consideration that the patient might be at risk for infection with the SARS-CoV-2 virus that causes COVID-19.   Applicable protocols for evaluation were followed during the patient's care.   COVID-19 was considered as part of the patient's evaluation. A test was obtained during this visit prior to the patient's admission.    Medical Decision Makin-year-old female presents after a fall.  Vital signs are stable.  Patient notes landing on her left hip.  Broad differential  was pursued including not limited to fracture, dislocation, neurovascular injury, intracranial hemorrhage, stroke, mass, etc.  Patient reports this was a mechanical fall.  She has ongoing issues with her muscular dystrophy and had a fall secondary to this.  She denies any her head injury or LOC.  No cervical, thoracic, or lumbar spine numbness without step-off or deformity.  CBC looks normal without evidence of significant anemia or leukocytosis.  BMP shows no acute electrolyte, metabolic or renal dysfunction.  EKG shows sinus rhythm, no evidence of acute ST-T wave change.  No active ischemia.  No evidence of arrhythmia.  Coags normal.  X-ray confirms fracture, nondisplaced intertrochanteric fracture.  Neurovascular intact distally.  No other injuries noted on head to toe exam.  Spoke with orthopedics who agrees.  They will plan to operative tomorrow.  Admitted to the hospitalist who graciously excepted.    Diagnosis:    ICD-10-CM    1. Closed nondisplaced intertrochanteric fracture of left femur, initial encounter (H)  S72.145A    2. Fall, initial encounter  W19.XXXA    3. Hip pain, left  M25.552        Disposition:  Admitted to Dr. Keita.      Scribe Disclosure:  IBarbra, am serving as a scribe at 11:28 AM on 10/10/2020 to document services personally performed by Yaz Feliz MD based on my observations and the provider's statements to me.     Barbra Hair  10/10/2020   [facility] EMERGENCY DEPT       Yaz Feliz MD  10/10/20 6826

## 2020-10-10 NOTE — PROGRESS NOTES
Discussed this physician with Dr Feliz.    Patient undergoing a neuromuscular workup for a myotonic disorder.  Otherwise relatively healthy per report.  Sustained a ground level fall today, imaging, demonstrates a left intertrochanteric femur fracture.    Patient is being admitted to internal medicine.  Will plan for OR tomorrow morning for IM nail left femur.  I will meet and discuss surgery with the patient in the morning.      Huan Feliciano MD  Orthopaedic Spine Surgery  [facility]     Never smoker

## 2020-10-10 NOTE — ED NOTES
Bed: ED32  Expected date: 10/10/20  Expected time: 11:02 AM  Means of arrival: Ambulance  Comments:  [id number]

## 2020-10-10 NOTE — PHARMACY-ADMISSION MEDICATION HISTORY
Admission medication history interview status for this patient is complete. See [facility] admission navigator for allergy information, prior to admission medications and immunization status.     Medication history interview done via telephone during Covid-19 pandemic, indicate source(s): Patient and spouse   Medication history resources (including written lists, pill bottles, clinic record):Epic List and Sure scripts  Pharmacy: [facility]    Changes made to PTA medication list:  Added: Oxybutynin, Prednisone, Mexiletine, losartan  Deleted: Vitamin B complex, Alendronate  Changed: Apap from 325mg to 500mg tid, Ibuprofen from prn to tid, vitamin d from 400 units to 2000    Actions taken by pharmacist (provider contacted, etc):None     Additional medication history information:None    Medication reconciliation/reorder completed by provider prior to medication history?  N    Prior to Admission medications    Medication Sig Last Dose Taking? Auth Provider   acetaminophen (TYLENOL) 500 MG tablet Take 500 mg by mouth 3 times daily  10/10/2020 at x1 Yes Unknown, Entered By History   ibuprofen (ADVIL/MOTRIN) 200 MG capsule Take 200 mg by mouth 3 times daily  10/10/2020 at x1 Yes Reported, Patient   losartan (COZAAR) 25 MG tablet Take 12.5 mg by mouth daily as needed (for BP > 160/90) Past Month at Unknown time Yes Unknown, Entered By History   mexiletine (MEXITIL) 150 MG capsule Take 150 mg by mouth 2 times daily 10/10/2020 at am Yes Unknown, Entered By History   oxybutynin ER (DITROPAN-XL) 5 MG 24 hr tablet Take 5 mg by mouth At Bedtime 10/9/2020 at Unknown time Yes Unknown, Entered By History   predniSONE (DELTASONE) 5 MG tablet Take 25 mg by mouth twice a week on Saturday and Kenneth 10/10/2020 at am Yes Unknown, Entered By History   Vitamin D, Cholecalciferol, 25 MCG (1000 UT) TABS Take 2 tablets by mouth daily 10/10/2020 at am Yes Unknown, Entered By History   order for DME Equipment being ordered: Walker Wheels () and  Justo ()  Treatment Diagnosis: Impaired functional mobility   Lisandra Wiggins PA-C

## 2020-10-10 NOTE — ED TRIAGE NOTES
Patient brought in by EMS. Patient had mechanical fall in home today.  was outside. Patient states she landed on left hip, c/o left hip pain. Denies hitting her head. Denies pain anywhere else.     Received 100 mcg Fentanyl in rig

## 2020-10-10 NOTE — ED NOTES
[facility]  ED Nurse Handoff Report    David Hogan is a 75 year old female   ED Chief complaint: Fall  . ED Diagnosis:   Final diagnoses:   Closed nondisplaced intertrochanteric fracture of left femur, initial encounter (H)   Fall, initial encounter   Hip pain, left     Allergies:   Allergies   Allergen Reactions     Amoxicillin      Hydrochlorothiazide W/Triamterene Other (See Comments)     Hyponatremia     Sertraline Other (See Comments)     Diarrhea       Code Status: Prior  Activity level - Baseline/Home:  Stand by Assist. Activity Level - Current:   Assist X 2. Lift room needed: No. Bariatric: No   Needed: No   Isolation: No. Infection: Not Applicable.     Vital Signs:   Vitals:    10/10/20 1117 10/10/20 1118 10/10/20 1130 10/10/20 1145   BP:  (!) 150/82 (!) 141/72 (!) 172/83   Pulse: 72  72 71   Resp: 16      Temp: 97.7  F (36.5  C)      TempSrc: Oral      SpO2: 93%  95% 92%       Cardiac Rhythm:  ,      Pain level:    Patient confused: No. Patient Falls Risk: Yes.   Elimination Status: Voided PTA   Patient Report - Initial Complaint: Patient brought in by EMS. Patient had mechanical fall in home today.  was outside. Patient states she landed on left hip, c/o left hip pain. Denies hitting her head. Denies pain anywhere else.      Received 100 mcg Fentanyl in rig . Focused Assessment: Musculoskeletal - Pain Body Location: hip (left) CMS Intact: Yes    Tests Performed: Labs, Imaging . Abnormal Results: Labs Ordered and Resulted from Time of ED Arrival Up to the Time of Departure from the ED - No data to display  XR Femur Left 2 Views   Final Result   IMPRESSION:    1.  Left femur intertrochanteric fracture with mild anterior apex   angulation.   2.  No additional fractures are evident.   3.  Normal left hip and knee alignment.   4.  Bone demineralization.      DENISHA COMBS MD      XR Pelvis 1/2 Views   Final Result   IMPRESSION:   1.  Nondisplaced intertrochanteric  fracture of the left femur.   2.  No additional fractures are evident.   3.  Normal left hip joint spacing and alignment.   4.  Postoperative and degenerative changes in the lower lumbar spine.   Moderate right hip degenerative arthrosis. Bone demineralization.      DENISHA COMBS MD        .   Treatments provided: Pain medications  Family Comments:  at bedside   OBS brochure/video discussed/provided to patient:  N/A  ED Medications:   Medications   HYDROmorphone (PF) (DILAUDID) injection 0.2 mg (0.2 mg Intravenous Given 10/10/20 1158)     Drips infusing:  No  For the majority of the shift, the patient's behavior Green. Interventions performed were NA.    Sepsis treatment initiated: No     Patient tested for COVID 19 prior to admission: YES    ED Nurse Name/Phone Number: Tea Peña RN,   1:06 PM  RECEIVING UNIT ED HANDOFF REVIEW    Above ED Nurse Handoff Report was reviewed: Yes  Reviewed by: Bettye Pearce RN on October 10, 2020 at 2:16 PM

## 2020-10-10 NOTE — H&P
[facility] Hospitalist Admission Note  Name: David Hogan    MRN: [id number]  YOB: 1945    Age: 75 year old  Date of admission: 10/10/2020  Primary care provider: Jessica Thompson        Assessment & Plan   David Hogan is a 75 year old female with PMhx of osteoporosis, osteoarthritis, hypertension, hyponatremia, prior tobacco dependence, chronic back pain due to lumbar stenosis, who presents after a fall from standing height with left intertrochanteric femur fracture.  The patient was admitted to inpatient status for further cares and surgical fixation.    Active Hospital Problems    #Fall  Patient sustained a fall 10/9 after attempting to transfer out of a seated position, landing on her left side. Secondary survey without acute injury.  Recalls the events of the fall, denies LOC, head injury, or pre-syncopal symptoms.   Currently the patient is process of work-up for muscular disorder with Dr. Cagle of [place] [facility] with a working diagnosis of Myotonic dystrophy per report (unable to review records at this time). Her symptoms include significant intermittent lower extremity weakness impairing her ambulation. She recently had a muscle biopsy on 9/10 with Dr. Chanel.  Is being treated with twice weekly doses of prednisone, 25 mg over the past 3 weeks.    -continue PTA prednisone therapy trial  -r/out orthostatic component once appropriate to obtain as cause for falls, hx of orthostatic intolerance  -records requested for muscle biopsy [place] [facility]  -surgical fixation for fracture as below  -Will eventually require PT eval for TCU. SW consulted    #Left intertrochanteric femur fracture  Sustained after fall. XR on admisison demonstrated left femur intertrochanteric fracture with mild anterior apex angulation. On admission well controlled pain and without physical exam findings of neurovascular compromise. The patient has history of osteoporosis for  which she is been recommended Prolia.  Prior bone density scans have demonstrated a  T-score at the hip -2.3.  - No prior issues with anesthesia or acute issues identified that would prevent surgery. Limited in activity tolerance due to potential myotonic disorder. No history of cardiac disease or decompensated cardiac symptoms identified.  -NWB  -Appreciate orthopedic surgery consult  -Plan for operative management 10/11  -N.p.o. after midnight  -Continue supportive cares and pain control  -Continue alendronate, vitamin D supplement and recommended ongoing follow-up with primary care for osteoporosis  -coags pending, type and screen ordered in ED    #Hyponatremia   Presented with a sodium of 131. Appears largely asymptomatic, with appropriate volume status. Doubt that her mild elyte disturbance is causation for significant LE weakness and the above presentation.    She has history of chronic hyponatremia thought to be related to hypovolemia with sodium 129-131.  Had been directed to restrict fluids but this was complicated by orthostatic intolerance.  No salt restrictions in the outpatient setting. She does report alcohol intake, does not appear to be on sodium lowering medications as contribution.  -Will check serum and urine osms   -recheck Na with AM labs  -no IV fluids at this time as blood pressures stable and able to tolerate PO  -strict I's O's    Chronic Medical Conditions     #HTN  Hypertensive in the ED likely secondary to pain.  Patient has a history of hypertension with orthostatic hypotension.  She is prescribed as needed doses of antihypertensive therapy.  Continue to monitor blood pressure and treat pain as noted above.      Awaiting formal pharmacy reconciliation to resume home medications.     DVT Prophylaxis: Pneumatic Compression Devices  Code Status: Full Code  Expected discharge: 2 - 3 days, recommended to transitional care unit once surgical fixation and safe disposition plan.    COVID PCR  "STATUS: Pending, asymptomatic. No precautions.   Family Updated with Plan of Care:  at bedside, updated with plan of care, Jorge.    Davida Meza PA-C    Primary Care Physician   Jessica Thompson    Chief Complaint   Hip pain    History is obtained from the patient   Services Used: No    History of Present Illness   David Hogan is a 75 year old female who presents with left hip pain after a fall from standing height in her home.  The patient was transferring off of the couch on 10/10, attempting to grab a throw pillow. Her thighs and upper legs \"locked up\" and she fell landing on her left side, hip.  She denies LOC and recalls the events of the fall. No head injury was reported and her glasses remained intact. The patient was unable to ambulate after this.  The patient's spouse heard her yell from outside and contacted EMS to assist in bringing the patient into the ED.  Prior to falling the patient denied any acute symptoms. She is currently in process of outpatient work up with Neurologist for potential myotonic disorder including a muscle biopsy in mid September.  Her symptoms include ongoing issue lower extremity weakness.  The patient spouse has been providing much of her cares with activities of daily living such as dressing and transfers.  She uses a walker to ambulate longer distances.      Patient presented hypertensive with a blood pressure 185/93, vital signs otherwise within normal limits.  Laboratory studies remarkable for sodium of 131, potassium of 5.0.  Hemoglobin 11.6    Discussed with Dr. Gibbs in the ED, full chart review including lab work, imaging, and vital signs were reviewed. Patient received 0.2 mg IV dialudid in the ED. Dr. Gibbs spoke with Dr. Feliciano of orthopedeics who recommended surgical fixation.     Past Medical History    Hypertension  Osteoarthritis  Chronic complete full-thickness rotator cuff tear, right shoulder  Chronic back pain due to lumbar " stenosis, spondylosis  Adenoma of left adrenal nodule  Prior history of tobacco dependence  Osteoporosis     Past Surgical History   I have reviewed this patient's surgical history and updated it with pertinent information if needed.  Past Surgical History:   Procedure Laterality Date     BACK SURGERY       BIOPSY MUSCLE DIAGNOSTIC (LOCATION) Left 9/10/2020    Procedure: LEFT QUAD MUSCLE BIOPSY;  Surgeon: Yazan Chanel MD;  Location:  OR       Prior to Admission Medications   Prior to Admission Medications   Prescriptions Last Dose Informant Patient Reported? Taking?   acetaminophen (TYLENOL) 325 MG tablet   Yes No   Sig: Take 325-650 mg by mouth every 6 hours as needed for mild pain   alendronate (FOSAMAX) 70 MG tablet   Yes No   Sig: Take 70 mg by mouth once a week 30 minutes before food on .   ibuprofen (ADVIL/MOTRIN) 200 MG capsule   Yes No   Sig: Take 200 mg by mouth every 4 hours as needed for fever   order for DME   No No   Sig: Equipment being ordered: Walker Wheels () and Walker ()  Treatment Diagnosis: Impaired functional mobility   vitamin B complex with vitamin C (VITAMIN  B COMPLEX) tablet   Yes No   Sig: Take 1 tablet by mouth daily   vitamin D3 (CHOLECALCIFEROL) 10 MCG (400 UNIT) capsule   Yes No   Sig: Take 1 capsule by mouth daily      Facility-Administered Medications: None     Allergies   Allergies   Allergen Reactions     Amoxicillin      Hydrochlorothiazide W/Triamterene Other (See Comments)     Hyponatremia     Sertraline Other (See Comments)     Diarrhea       Social History   Patient lives with her spouse.  At baseline uses a walker to ambulate longer distances.  She reports 1-2 alcohol-containing beverages daily. No history of alcohol withdrawal. Former tobacco smoker with cessation approximately 1 year prior to presentation.     Family History   Father , MI.  Mother, dementia .  Brain aneurysm, DM, in sister    Review of Systems   The 10 point  Review of Systems is negative other than noted in the HPI or here. Reports chronic baseline tremor of upper extremities. Also has lightheadedness.    Physical Exam   Temp: 97.7  F (36.5  C) Temp src: Oral BP: (!) 159/99 Pulse: 76   Resp: 16 SpO2: 92 % O2 Device: None (Room air)    Vital Signs with Ranges  Temp:  [97.7  F (36.5  C)] 97.7  F (36.5  C)  Pulse:  [71-76] 76  Resp:  [16] 16  BP: (141-185)/(72-93) 185/93  SpO2:  [92 %-95 %] 92 %  0 lbs 0 oz    Constitutional: Awake, alert,  no apparent distress.  Eyes: Conjunctiva and pupils examined and normal.  HEENT: Non traumatic. Moist mucous membranes, normal dentition.  Respiratory: Clear to auscultation bilaterally, no crackles or wheezing.  Cardiovascular: Regular rate and rhythm, normal S1 and S2, and no murmur noted.  GI: Soft, non-distended, non-tender, bowel sounds present. No rebound tenderness or guarding.  Lymph/Hematologic: No anterior cervical or supraclavicular adenopathy.  Skin: Warm, dry. No edema.  Musculoskeletal: No spinous process tenderness.  Left upper extremity without bruising or range of motion limitations.  Left extremity shortened and rotated. Capillary refill intact in lower extremities. DP pulses present and symmetrical.  Neurologic: No tremor. Speech is clear. Moving all extremities with symmetrical strength. CN 2-12 grossly intact.  Coordination and sensation intact.   Psychiatric: Appropriate affect.    Data   Data reviewed today:      EKG: Sinus rhythm, ventricular rate 72.   Imaging:   Recent Results (from the past 24 hour(s))   XR Pelvis 1/2 Views    Narrative    PELVIS ONE TO TWO VIEWS October 10, 2020 12:21 PM     INDICATION: Left hip pain.     COMPARISON: None.      Impression    IMPRESSION:  1.  Nondisplaced intertrochanteric fracture of the left femur.  2.  No additional fractures are evident.  3.  Normal left hip joint spacing and alignment.  4.  Postoperative and degenerative changes in the lower lumbar spine.  Moderate right  hip degenerative arthrosis. Bone demineralization.    DENISHA COMBS MD   XR Femur Left 2 Views    Narrative    FEMUR LEFT TWO VIEWS October 10, 2020 12:22 PM     INDICATION: Left femur pain.     COMPARISON: None.      Impression    IMPRESSION:   1.  Left femur intertrochanteric fracture with mild anterior apex  angulation.  2.  No additional fractures are evident.  3.  Normal left hip and knee alignment.  4.  Bone demineralization.    DENISHA COMBS MD       Recent Labs   Lab 10/10/20  1155   WBC 8.1   HGB 11.6*   MCV 97          Davida Meza PA-C on 10/10/2020 at 1:25 PM

## 2020-10-11 ENCOUNTER — APPOINTMENT (OUTPATIENT)
Dept: GENERAL RADIOLOGY | Facility: CLINIC | Age: 75
DRG: 481 | End: 2020-10-11
Payer: MEDICARE

## 2020-10-11 ENCOUNTER — ANESTHESIA (OUTPATIENT)
Dept: SURGERY | Facility: CLINIC | Age: 75
DRG: 481 | End: 2020-10-11
Payer: MEDICARE

## 2020-10-11 ENCOUNTER — ANESTHESIA EVENT (OUTPATIENT)
Dept: SURGERY | Facility: CLINIC | Age: 75
DRG: 481 | End: 2020-10-11
Payer: MEDICARE

## 2020-10-11 LAB
ALBUMIN UR-MCNC: NEGATIVE MG/DL
ANION GAP SERPL CALCULATED.3IONS-SCNC: 6 MMOL/L (ref 3–14)
APPEARANCE UR: CLEAR
BILIRUB UR QL STRIP: NEGATIVE
BUN SERPL-MCNC: 25 MG/DL (ref 7–30)
CALCIUM SERPL-MCNC: 8.7 MG/DL (ref 8.5–10.1)
CHLORIDE SERPL-SCNC: 102 MMOL/L (ref 94–109)
CO2 SERPL-SCNC: 24 MMOL/L (ref 20–32)
COLOR UR AUTO: ABNORMAL
CREAT SERPL-MCNC: 0.64 MG/DL (ref 0.52–1.04)
CREAT SERPL-MCNC: 0.72 MG/DL (ref 0.52–1.04)
ERYTHROCYTE [DISTWIDTH] IN BLOOD BY AUTOMATED COUNT: 13.1 % (ref 10–15)
GFR SERPL CREATININE-BSD FRML MDRD: 82 ML/MIN/{1.73_M2}
GFR SERPL CREATININE-BSD FRML MDRD: 86 ML/MIN/{1.73_M2}
GLUCOSE SERPL-MCNC: 89 MG/DL (ref 70–99)
GLUCOSE UR STRIP-MCNC: NEGATIVE MG/DL
HCT VFR BLD AUTO: 32.9 % (ref 35–47)
HGB BLD-MCNC: 10.4 G/DL (ref 11.7–15.7)
HGB UR QL STRIP: ABNORMAL
KETONES UR STRIP-MCNC: NEGATIVE MG/DL
LEUKOCYTE ESTERASE UR QL STRIP: NEGATIVE
MCH RBC QN AUTO: 31 PG (ref 26.5–33)
MCHC RBC AUTO-ENTMCNC: 31.6 G/DL (ref 31.5–36.5)
MCV RBC AUTO: 98 FL (ref 78–100)
MUCOUS THREADS #/AREA URNS LPF: PRESENT /LPF
NITRATE UR QL: NEGATIVE
OSMOLALITY SERPL: 280 MMOL/KG (ref 280–301)
PH UR STRIP: 6 PH (ref 5–7)
PLATELET # BLD AUTO: 298 10E9/L (ref 150–450)
POTASSIUM SERPL-SCNC: 4.3 MMOL/L (ref 3.4–5.3)
RBC # BLD AUTO: 3.35 10E12/L (ref 3.8–5.2)
RBC #/AREA URNS AUTO: 3 /HPF (ref 0–2)
SODIUM SERPL-SCNC: 132 MMOL/L (ref 133–144)
SOURCE: ABNORMAL
SP GR UR STRIP: 1.02 (ref 1–1.03)
SQUAMOUS #/AREA URNS AUTO: <1 /HPF (ref 0–1)
UROBILINOGEN UR STRIP-MCNC: NORMAL MG/DL (ref 0–2)
WBC # BLD AUTO: 13 10E9/L (ref 4–11)
WBC #/AREA URNS AUTO: 4 /HPF (ref 0–5)

## 2020-10-11 PROCEDURE — 0QS736Z REPOSITION LEFT UPPER FEMUR WITH INTRAMEDULLARY INTERNAL FIXATION DEVICE, PERCUTANEOUS APPROACH: ICD-10-PCS | Performed by: ORTHOPAEDIC SURGERY

## 2020-10-11 PROCEDURE — 258N000003 HC RX IP 258 OP 636: Performed by: PHYSICIAN ASSISTANT

## 2020-10-11 PROCEDURE — 250N000011 HC RX IP 250 OP 636: Performed by: PHYSICIAN ASSISTANT

## 2020-10-11 PROCEDURE — 80048 BASIC METABOLIC PNL TOTAL CA: CPT | Performed by: PHYSICIAN ASSISTANT

## 2020-10-11 PROCEDURE — 250N000013 HC RX MED GY IP 250 OP 250 PS 637: Performed by: PHYSICIAN ASSISTANT

## 2020-10-11 PROCEDURE — 250N000011 HC RX IP 250 OP 636: Performed by: NURSE ANESTHETIST, CERTIFIED REGISTERED

## 2020-10-11 PROCEDURE — 250N000013 HC RX MED GY IP 250 OP 250 PS 637: Performed by: ANESTHESIOLOGY

## 2020-10-11 PROCEDURE — 761N000001 HC RECOVERY PHASE 1 LEVEL 1 FIRST HR: Performed by: ORTHOPAEDIC SURGERY

## 2020-10-11 PROCEDURE — 258N000003 HC RX IP 258 OP 636: Performed by: INTERNAL MEDICINE

## 2020-10-11 PROCEDURE — 272N000001 HC OR GENERAL SUPPLY STERILE: Performed by: ORTHOPAEDIC SURGERY

## 2020-10-11 PROCEDURE — 82565 ASSAY OF CREATININE: CPT | Performed by: PHYSICIAN ASSISTANT

## 2020-10-11 PROCEDURE — 250N000009 HC RX 250: Performed by: NURSE ANESTHETIST, CERTIFIED REGISTERED

## 2020-10-11 PROCEDURE — 36415 COLL VENOUS BLD VENIPUNCTURE: CPT | Performed by: PHYSICIAN ASSISTANT

## 2020-10-11 PROCEDURE — 250N000013 HC RX MED GY IP 250 OP 250 PS 637: Performed by: INTERNAL MEDICINE

## 2020-10-11 PROCEDURE — C1713 ANCHOR/SCREW BN/BN,TIS/BN: HCPCS | Performed by: ORTHOPAEDIC SURGERY

## 2020-10-11 PROCEDURE — 250N000009 HC RX 250: Performed by: ORTHOPAEDIC SURGERY

## 2020-10-11 PROCEDURE — 81001 URINALYSIS AUTO W/SCOPE: CPT | Performed by: INTERNAL MEDICINE

## 2020-10-11 PROCEDURE — 120N000001 HC R&B MED SURG/OB

## 2020-10-11 PROCEDURE — 999N000179 XR SURGERY CARM FLUORO LESS THAN 5 MIN W STILLS: Mod: TC

## 2020-10-11 PROCEDURE — 250N000009 HC RX 250: Performed by: ANESTHESIOLOGY

## 2020-10-11 PROCEDURE — 85027 COMPLETE CBC AUTOMATED: CPT | Performed by: PHYSICIAN ASSISTANT

## 2020-10-11 PROCEDURE — 250N000009 HC RX 250: Performed by: PHYSICIAN ASSISTANT

## 2020-10-11 PROCEDURE — 360N000030 HC SURGERY LEVEL 4 W FLUORO 1ST 30 MIN: Performed by: ORTHOPAEDIC SURGERY

## 2020-10-11 PROCEDURE — 999N000136 HC STATISTIC PRE PROC ASSESS II: Performed by: ORTHOPAEDIC SURGERY

## 2020-10-11 PROCEDURE — 258N000003 HC RX IP 258 OP 636: Performed by: NURSE ANESTHETIST, CERTIFIED REGISTERED

## 2020-10-11 PROCEDURE — 99232 SBSQ HOSP IP/OBS MODERATE 35: CPT | Performed by: INTERNAL MEDICINE

## 2020-10-11 PROCEDURE — 360N000027 HC SURGERY LEVEL 4 EA 15 ADDTL MIN: Performed by: ORTHOPAEDIC SURGERY

## 2020-10-11 PROCEDURE — 370N000002 HC ANESTHESIA TECHNICAL FEE, EACH ADDTL 15 MIN: Performed by: ORTHOPAEDIC SURGERY

## 2020-10-11 PROCEDURE — 250N000011 HC RX IP 250 OP 636: Performed by: ANESTHESIOLOGY

## 2020-10-11 PROCEDURE — 370N000001 HC ANESTHESIA TECHNICAL FEE, 1ST 30 MIN: Performed by: ORTHOPAEDIC SURGERY

## 2020-10-11 DEVICE — IMP NAIL STRK TROCH IM GAMMA 125DEG 11MMX18CM 3125-1180S: Type: IMPLANTABLE DEVICE | Site: HIP | Status: FUNCTIONAL

## 2020-10-11 DEVICE — IMP SCR BONE STRK G3 LAG 10.5X90MM TI 3060-0090S: Type: IMPLANTABLE DEVICE | Site: HIP | Status: FUNCTIONAL

## 2020-10-11 DEVICE — IMP SCR STRK LOCK 5.0X35MM FT 1896-5035S: Type: IMPLANTABLE DEVICE | Site: HIP | Status: FUNCTIONAL

## 2020-10-11 RX ORDER — PROPOFOL 10 MG/ML
INJECTION, EMULSION INTRAVENOUS PRN
Status: DISCONTINUED | OUTPATIENT
Start: 2020-10-11 | End: 2020-10-11

## 2020-10-11 RX ORDER — CLINDAMYCIN PHOSPHATE 900 MG/50ML
900 INJECTION, SOLUTION INTRAVENOUS
Status: COMPLETED | OUTPATIENT
Start: 2020-10-11 | End: 2020-10-11

## 2020-10-11 RX ORDER — HYDROMORPHONE HYDROCHLORIDE 1 MG/ML
0.2 INJECTION, SOLUTION INTRAMUSCULAR; INTRAVENOUS; SUBCUTANEOUS
Status: DISCONTINUED | OUTPATIENT
Start: 2020-10-11 | End: 2020-10-14 | Stop reason: HOSPADM

## 2020-10-11 RX ORDER — SODIUM CHLORIDE, SODIUM LACTATE, POTASSIUM CHLORIDE, CALCIUM CHLORIDE 600; 310; 30; 20 MG/100ML; MG/100ML; MG/100ML; MG/100ML
INJECTION, SOLUTION INTRAVENOUS CONTINUOUS PRN
Status: DISCONTINUED | OUTPATIENT
Start: 2020-10-11 | End: 2020-10-11

## 2020-10-11 RX ORDER — ACETAMINOPHEN 325 MG/1
975 TABLET ORAL EVERY 8 HOURS
Status: DISCONTINUED | OUTPATIENT
Start: 2020-10-12 | End: 2020-10-14 | Stop reason: HOSPADM

## 2020-10-11 RX ORDER — LIDOCAINE 40 MG/G
CREAM TOPICAL
Status: DISCONTINUED | OUTPATIENT
Start: 2020-10-11 | End: 2020-10-14 | Stop reason: HOSPADM

## 2020-10-11 RX ORDER — ONDANSETRON 4 MG/1
4 TABLET, ORALLY DISINTEGRATING ORAL EVERY 6 HOURS PRN
Status: DISCONTINUED | OUTPATIENT
Start: 2020-10-11 | End: 2020-10-14 | Stop reason: HOSPADM

## 2020-10-11 RX ORDER — DIAZEPAM 10 MG/2ML
2.5 INJECTION, SOLUTION INTRAMUSCULAR; INTRAVENOUS
Status: DISCONTINUED | OUTPATIENT
Start: 2020-10-11 | End: 2020-10-11 | Stop reason: HOSPADM

## 2020-10-11 RX ORDER — SODIUM CHLORIDE, SODIUM LACTATE, POTASSIUM CHLORIDE, CALCIUM CHLORIDE 600; 310; 30; 20 MG/100ML; MG/100ML; MG/100ML; MG/100ML
INJECTION, SOLUTION INTRAVENOUS CONTINUOUS
Status: DISCONTINUED | OUTPATIENT
Start: 2020-10-11 | End: 2020-10-11 | Stop reason: HOSPADM

## 2020-10-11 RX ORDER — FENTANYL CITRATE 50 UG/ML
INJECTION, SOLUTION INTRAMUSCULAR; INTRAVENOUS PRN
Status: DISCONTINUED | OUTPATIENT
Start: 2020-10-11 | End: 2020-10-11

## 2020-10-11 RX ORDER — CLINDAMYCIN PHOSPHATE 900 MG/50ML
900 INJECTION, SOLUTION INTRAVENOUS SEE ADMIN INSTRUCTIONS
Status: DISCONTINUED | OUTPATIENT
Start: 2020-10-11 | End: 2020-10-11 | Stop reason: HOSPADM

## 2020-10-11 RX ORDER — LABETALOL 20 MG/4 ML (5 MG/ML) INTRAVENOUS SYRINGE
10
Status: COMPLETED | OUTPATIENT
Start: 2020-10-11 | End: 2020-10-11

## 2020-10-11 RX ORDER — DEXAMETHASONE SODIUM PHOSPHATE 4 MG/ML
INJECTION, SOLUTION INTRA-ARTICULAR; INTRALESIONAL; INTRAMUSCULAR; INTRAVENOUS; SOFT TISSUE PRN
Status: DISCONTINUED | OUTPATIENT
Start: 2020-10-11 | End: 2020-10-11

## 2020-10-11 RX ORDER — AMOXICILLIN 250 MG
1 CAPSULE ORAL 2 TIMES DAILY
Status: DISCONTINUED | OUTPATIENT
Start: 2020-10-11 | End: 2020-10-14 | Stop reason: HOSPADM

## 2020-10-11 RX ORDER — ACETAMINOPHEN 325 MG/1
650 TABLET ORAL EVERY 4 HOURS PRN
Status: DISCONTINUED | OUTPATIENT
Start: 2020-10-14 | End: 2020-10-14 | Stop reason: HOSPADM

## 2020-10-11 RX ORDER — DOCUSATE SODIUM 100 MG/1
100 CAPSULE, LIQUID FILLED ORAL 2 TIMES DAILY
Status: DISCONTINUED | OUTPATIENT
Start: 2020-10-11 | End: 2020-10-14 | Stop reason: HOSPADM

## 2020-10-11 RX ORDER — LIDOCAINE 40 MG/G
CREAM TOPICAL
Status: DISCONTINUED | OUTPATIENT
Start: 2020-10-11 | End: 2020-10-11 | Stop reason: HOSPADM

## 2020-10-11 RX ORDER — FENTANYL CITRATE 50 UG/ML
25-50 INJECTION, SOLUTION INTRAMUSCULAR; INTRAVENOUS
Status: DISCONTINUED | OUTPATIENT
Start: 2020-10-11 | End: 2020-10-11 | Stop reason: HOSPADM

## 2020-10-11 RX ORDER — HYDRALAZINE HYDROCHLORIDE 20 MG/ML
2.5-5 INJECTION INTRAMUSCULAR; INTRAVENOUS EVERY 10 MIN PRN
Status: DISCONTINUED | OUTPATIENT
Start: 2020-10-11 | End: 2020-10-11 | Stop reason: HOSPADM

## 2020-10-11 RX ORDER — GLYCOPYRROLATE 0.2 MG/ML
INJECTION, SOLUTION INTRAMUSCULAR; INTRAVENOUS PRN
Status: DISCONTINUED | OUTPATIENT
Start: 2020-10-11 | End: 2020-10-11

## 2020-10-11 RX ORDER — BUPIVACAINE HYDROCHLORIDE AND EPINEPHRINE 5; 5 MG/ML; UG/ML
INJECTION, SOLUTION EPIDURAL; INTRACAUDAL; PERINEURAL PRN
Status: DISCONTINUED | OUTPATIENT
Start: 2020-10-11 | End: 2020-10-11 | Stop reason: HOSPADM

## 2020-10-11 RX ORDER — NEOSTIGMINE METHYLSULFATE 1 MG/ML
VIAL (ML) INJECTION PRN
Status: DISCONTINUED | OUTPATIENT
Start: 2020-10-11 | End: 2020-10-11

## 2020-10-11 RX ORDER — CLINDAMYCIN PHOSPHATE 600 MG/50ML
600 INJECTION, SOLUTION INTRAVENOUS EVERY 8 HOURS
Status: COMPLETED | OUTPATIENT
Start: 2020-10-11 | End: 2020-10-12

## 2020-10-11 RX ORDER — SODIUM CHLORIDE, SODIUM LACTATE, POTASSIUM CHLORIDE, CALCIUM CHLORIDE 600; 310; 30; 20 MG/100ML; MG/100ML; MG/100ML; MG/100ML
INJECTION, SOLUTION INTRAVENOUS CONTINUOUS
Status: DISCONTINUED | OUTPATIENT
Start: 2020-10-11 | End: 2020-10-13

## 2020-10-11 RX ORDER — OXYCODONE HYDROCHLORIDE 5 MG/1
5 TABLET ORAL EVERY 4 HOURS PRN
Status: DISCONTINUED | OUTPATIENT
Start: 2020-10-11 | End: 2020-10-14 | Stop reason: HOSPADM

## 2020-10-11 RX ORDER — ONDANSETRON 2 MG/ML
4 INJECTION INTRAMUSCULAR; INTRAVENOUS EVERY 6 HOURS PRN
Status: DISCONTINUED | OUTPATIENT
Start: 2020-10-11 | End: 2020-10-14 | Stop reason: HOSPADM

## 2020-10-11 RX ORDER — KETOROLAC TROMETHAMINE 30 MG/ML
15 INJECTION, SOLUTION INTRAMUSCULAR; INTRAVENOUS ONCE
Status: COMPLETED | OUTPATIENT
Start: 2020-10-11 | End: 2020-10-11

## 2020-10-11 RX ORDER — ACETAMINOPHEN 325 MG/1
650 TABLET ORAL ONCE
Status: COMPLETED | OUTPATIENT
Start: 2020-10-11 | End: 2020-10-11

## 2020-10-11 RX ORDER — HYDROMORPHONE HYDROCHLORIDE 1 MG/ML
0.4 INJECTION, SOLUTION INTRAMUSCULAR; INTRAVENOUS; SUBCUTANEOUS
Status: DISCONTINUED | OUTPATIENT
Start: 2020-10-11 | End: 2020-10-14 | Stop reason: HOSPADM

## 2020-10-11 RX ORDER — PROCHLORPERAZINE MALEATE 5 MG
5 TABLET ORAL EVERY 6 HOURS PRN
Status: DISCONTINUED | OUTPATIENT
Start: 2020-10-11 | End: 2020-10-14 | Stop reason: HOSPADM

## 2020-10-11 RX ORDER — MEPERIDINE HYDROCHLORIDE 25 MG/ML
12.5 INJECTION INTRAMUSCULAR; INTRAVENOUS; SUBCUTANEOUS EVERY 5 MIN PRN
Status: DISCONTINUED | OUTPATIENT
Start: 2020-10-11 | End: 2020-10-11 | Stop reason: HOSPADM

## 2020-10-11 RX ORDER — BISACODYL 10 MG
10 SUPPOSITORY, RECTAL RECTAL DAILY PRN
Status: DISCONTINUED | OUTPATIENT
Start: 2020-10-11 | End: 2020-10-14 | Stop reason: HOSPADM

## 2020-10-11 RX ORDER — ALBUTEROL SULFATE 0.83 MG/ML
2.5 SOLUTION RESPIRATORY (INHALATION) EVERY 4 HOURS PRN
Status: DISCONTINUED | OUTPATIENT
Start: 2020-10-11 | End: 2020-10-11 | Stop reason: HOSPADM

## 2020-10-11 RX ORDER — PHENYLEPHRINE HYDROCHLORIDE 10 MG/ML
INJECTION INTRAVENOUS PRN
Status: DISCONTINUED | OUTPATIENT
Start: 2020-10-11 | End: 2020-10-11

## 2020-10-11 RX ORDER — DIMENHYDRINATE 50 MG/ML
25 INJECTION, SOLUTION INTRAMUSCULAR; INTRAVENOUS
Status: DISCONTINUED | OUTPATIENT
Start: 2020-10-11 | End: 2020-10-11 | Stop reason: HOSPADM

## 2020-10-11 RX ORDER — OXYCODONE HYDROCHLORIDE 5 MG/1
5 TABLET ORAL EVERY 4 HOURS PRN
Status: DISCONTINUED | OUTPATIENT
Start: 2020-10-11 | End: 2020-10-11

## 2020-10-11 RX ORDER — NALOXONE HYDROCHLORIDE 0.4 MG/ML
.1-.4 INJECTION, SOLUTION INTRAMUSCULAR; INTRAVENOUS; SUBCUTANEOUS
Status: ACTIVE | OUTPATIENT
Start: 2020-10-11 | End: 2020-10-12

## 2020-10-11 RX ORDER — ONDANSETRON 4 MG/1
4 TABLET, ORALLY DISINTEGRATING ORAL EVERY 30 MIN PRN
Status: DISCONTINUED | OUTPATIENT
Start: 2020-10-11 | End: 2020-10-11 | Stop reason: HOSPADM

## 2020-10-11 RX ORDER — MEXILETINE HYDROCHLORIDE 150 MG/1
150 CAPSULE ORAL 2 TIMES DAILY
Status: DISCONTINUED | OUTPATIENT
Start: 2020-10-11 | End: 2020-10-14 | Stop reason: HOSPADM

## 2020-10-11 RX ORDER — POLYETHYLENE GLYCOL 3350 17 G/17G
17 POWDER, FOR SOLUTION ORAL DAILY
Status: DISCONTINUED | OUTPATIENT
Start: 2020-10-12 | End: 2020-10-14 | Stop reason: HOSPADM

## 2020-10-11 RX ORDER — ONDANSETRON 2 MG/ML
4 INJECTION INTRAMUSCULAR; INTRAVENOUS EVERY 30 MIN PRN
Status: DISCONTINUED | OUTPATIENT
Start: 2020-10-11 | End: 2020-10-11 | Stop reason: HOSPADM

## 2020-10-11 RX ORDER — MAGNESIUM HYDROXIDE 1200 MG/15ML
LIQUID ORAL PRN
Status: DISCONTINUED | OUTPATIENT
Start: 2020-10-11 | End: 2020-10-11 | Stop reason: HOSPADM

## 2020-10-11 RX ORDER — FENTANYL CITRATE 50 UG/ML
25 INJECTION, SOLUTION INTRAMUSCULAR; INTRAVENOUS ONCE
Status: COMPLETED | OUTPATIENT
Start: 2020-10-11 | End: 2020-10-11

## 2020-10-11 RX ADMIN — ONDANSETRON 4 MG: 2 INJECTION INTRAMUSCULAR; INTRAVENOUS at 11:41

## 2020-10-11 RX ADMIN — DOCUSATE SODIUM AND SENNOSIDES 1 TABLET: 8.6; 5 TABLET ORAL at 20:44

## 2020-10-11 RX ADMIN — PHENYLEPHRINE HYDROCHLORIDE 50 MCG: 10 INJECTION INTRAVENOUS at 12:59

## 2020-10-11 RX ADMIN — SODIUM CHLORIDE: 9 INJECTION, SOLUTION INTRAVENOUS at 11:37

## 2020-10-11 RX ADMIN — FENTANYL CITRATE 25 MCG: 50 INJECTION, SOLUTION INTRAMUSCULAR; INTRAVENOUS at 10:23

## 2020-10-11 RX ADMIN — MEXILETINE HYDROCHLORIDE 150 MG: 150 CAPSULE ORAL at 20:44

## 2020-10-11 RX ADMIN — ROCURONIUM BROMIDE 20 MG: 10 INJECTION INTRAVENOUS at 11:41

## 2020-10-11 RX ADMIN — FENTANYL CITRATE 25 MCG: 50 INJECTION, SOLUTION INTRAMUSCULAR; INTRAVENOUS at 14:24

## 2020-10-11 RX ADMIN — FENTANYL CITRATE 25 MCG: 50 INJECTION, SOLUTION INTRAMUSCULAR; INTRAVENOUS at 15:01

## 2020-10-11 RX ADMIN — CLINDAMYCIN PHOSPHATE 600 MG: 600 INJECTION, SOLUTION INTRAVENOUS at 19:46

## 2020-10-11 RX ADMIN — FENTANYL CITRATE 100 MCG: 50 INJECTION, SOLUTION INTRAMUSCULAR; INTRAVENOUS at 11:41

## 2020-10-11 RX ADMIN — CLINDAMYCIN PHOSPHATE 900 MG: 900 INJECTION, SOLUTION INTRAVENOUS at 11:37

## 2020-10-11 RX ADMIN — SODIUM CHLORIDE, POTASSIUM CHLORIDE, SODIUM LACTATE AND CALCIUM CHLORIDE: 600; 310; 30; 20 INJECTION, SOLUTION INTRAVENOUS at 12:01

## 2020-10-11 RX ADMIN — OXYCODONE HYDROCHLORIDE 5 MG: 5 TABLET ORAL at 20:45

## 2020-10-11 RX ADMIN — GLYCOPYRROLATE 0.2 MG: 0.2 INJECTION, SOLUTION INTRAMUSCULAR; INTRAVENOUS at 11:47

## 2020-10-11 RX ADMIN — ACETAMINOPHEN 650 MG: 325 TABLET, FILM COATED ORAL at 15:52

## 2020-10-11 RX ADMIN — LIDOCAINE HYDROCHLORIDE 50 MG: 10 INJECTION, SOLUTION EPIDURAL; INFILTRATION; INTRACAUDAL; PERINEURAL at 11:41

## 2020-10-11 RX ADMIN — HYDROMORPHONE HYDROCHLORIDE 0.2 MG: 1 INJECTION, SOLUTION INTRAMUSCULAR; INTRAVENOUS; SUBCUTANEOUS at 02:52

## 2020-10-11 RX ADMIN — GLYCOPYRROLATE 0.2 MG: 0.2 INJECTION, SOLUTION INTRAMUSCULAR; INTRAVENOUS at 13:25

## 2020-10-11 RX ADMIN — FENTANYL CITRATE 25 MCG: 50 INJECTION, SOLUTION INTRAMUSCULAR; INTRAVENOUS at 13:55

## 2020-10-11 RX ADMIN — PROPOFOL 100 MG: 10 INJECTION, EMULSION INTRAVENOUS at 11:41

## 2020-10-11 RX ADMIN — DEXAMETHASONE SODIUM PHOSPHATE 4 MG: 4 INJECTION, SOLUTION INTRA-ARTICULAR; INTRALESIONAL; INTRAMUSCULAR; INTRAVENOUS; SOFT TISSUE at 11:41

## 2020-10-11 RX ADMIN — PHENYLEPHRINE HYDROCHLORIDE 100 MCG: 10 INJECTION INTRAVENOUS at 12:35

## 2020-10-11 RX ADMIN — KETOROLAC TROMETHAMINE 15 MG: 30 INJECTION, SOLUTION INTRAMUSCULAR at 10:21

## 2020-10-11 RX ADMIN — PHENYLEPHRINE HYDROCHLORIDE 150 MCG: 10 INJECTION INTRAVENOUS at 11:47

## 2020-10-11 RX ADMIN — Medication 1 MG: at 13:26

## 2020-10-11 RX ADMIN — SODIUM CHLORIDE, POTASSIUM CHLORIDE, SODIUM LACTATE AND CALCIUM CHLORIDE: 600; 310; 30; 20 INJECTION, SOLUTION INTRAVENOUS at 12:34

## 2020-10-11 RX ADMIN — SODIUM CHLORIDE, POTASSIUM CHLORIDE, SODIUM LACTATE AND CALCIUM CHLORIDE: 600; 310; 30; 20 INJECTION, SOLUTION INTRAVENOUS at 20:53

## 2020-10-11 RX ADMIN — FENTANYL CITRATE 50 MCG: 50 INJECTION, SOLUTION INTRAMUSCULAR; INTRAVENOUS at 12:42

## 2020-10-11 RX ADMIN — HYDROMORPHONE HYDROCHLORIDE 0.2 MG: 1 INJECTION, SOLUTION INTRAMUSCULAR; INTRAVENOUS; SUBCUTANEOUS at 09:35

## 2020-10-11 RX ADMIN — LABETALOL 20 MG/4 ML (5 MG/ML) INTRAVENOUS SYRINGE 10 MG: at 14:14

## 2020-10-11 SDOH — HEALTH STABILITY: MENTAL HEALTH: CURRENT SMOKER: 0

## 2020-10-11 ASSESSMENT — ACTIVITIES OF DAILY LIVING (ADL)
ADLS_ACUITY_SCORE: 16
ADLS_ACUITY_SCORE: 17
ADLS_ACUITY_SCORE: 20

## 2020-10-11 ASSESSMENT — LIFESTYLE VARIABLES: TOBACCO_USE: 1

## 2020-10-11 NOTE — ANESTHESIA CARE TRANSFER NOTE
Patient: David Hogan    Procedure(s):  OPEN REDUCTION INTERNAL FIXATION, FRACTURE, LEFT  FEMUR, USING INTRAMEDULLARY NAIL    Diagnosis: Closed fracture of left hip, initial encounter (H) [S72.002A]  Diagnosis Additional Information: No value filed.    Anesthesia Type:   General     Note:  Airway :Face Mask  Patient transferred to:PACU  Comments: Joni Report: Identifed the Patient, Identified the Reponsible Provider, Reviewed the pertinent medical history, Discussed the surgical course, Reviewed Intra-OP anesthesia mangement and issues during anesthesia, Set expectations for post-procedure period and Allowed opportunity for questions and acknowledgement of understanding      Vitals: (Last set prior to Anesthesia Care Transfer)    CRNA VITALS  10/11/2020 1321 - 10/11/2020 1357      10/11/2020             Pulse:  91    SpO2:  98 %                Electronically Signed By: JORGE Rangel CRNA  October 11, 2020  1:57 PM

## 2020-10-11 NOTE — OP NOTE
Orthopedic Surgery Operative Report    Patient:   David Hogan  MRN:   [id number]   :  1945  Facility: [facility]   Date:  10/11/20         PREOPERATIVE DIAGNOSIS:    Left intertrochanteric femur fracture    POSTOPERATIVE DIAGNOSIS:    Left intertrochanteric femur fracture    PROCEDURE PERFORMED:    Open reduction internal fixation left intertrochanteric femur fracture with an intramedullary nail    SURGEON:    Huan Feliciano MD    SURGICAL ASSISTANT:    Lindsey Edward PA-C    ANESTHESIA:  General    FINDINGS:    Left intertrochanteric femur fracture, apex anterior.  Reduced and stabilized the case conclusion    COMPLICATIONS:     None apparent    SPECIMENS:    None    ESTIMATED BLOOD LOSS:  150 cc    IMPLANTS:    Mau gamma 3 intramedullary nail 11 mm x 180 mm, 90 mm lag screw, 35 mm distal locking screw    INDICATION FOR OPERATION:  The patient is a 75-year-old female who unfortunately sustained a ground-level fall as result of a myotonic disorder that is currently being evaluated.  She sustained a left intertrochanteric femur fracture as a result.  I discussed with her the risks benefits alternatives of the above procedure as a means to stabilize the fracture and enable early mobility and the patient wished to proceed    DESCRIPTION OF PROCEDURE:    The patient was met in the preoperative holding area and the operative site was confirmed and marked.  We once again reviewed the risks, benefits, and alternatives of the operation and the patient wished to proceed with the surgery.    The patient was taken back to the operating room and induced under general anesthesia.  The patient was positioned supine on the [place] table with all bony prominences well padded.  We brought in the fluoroscopy machine and obtained images while adjusting the [place] table to reduce the fracture.  It was noted that the fracture is apex anterior which was not able to be fixed with [place] table movements  alone, and therefore we tried pressing from anterior to posterior with an instrument on the anterior thigh and found that it reduced the fracture nicely.  Thereafter the surgical site was prepped and draped in the usual standard fashion.    Injected local anesthetic and then made a longitudinal incision proximal to the greater trochanter in the skin, carried dissection deeper through the fascia angela.  I then placed my start pin and the tip the greater trochanter, and drove it under AP and lateral fluoroscopy into the proximal portion of the femur.  This was in good position, and therefore I used my opening reamer over the top of it.  I then passed my ball-tipped guidewire.  I advanced the intramedullary nail down to the appropriate level where the interlocking screw would capture the femoral head appropriately.  [Approach]    I passed the intramedullary nail down over the guidewire confirming again appropriate position on AP and lateral fluoroscopy.  As I passed this nail, we once again pressed anterior to posterior on the thigh at the site of the apex anterior angulation, and noted that it was reduced with the nail across.  Once we released pressure on the thigh even with a nail passed the site, there was some residual apex anterior angulation across the fracture that would occur.  I therefore reduced it again and used the jig on the nail to pass a threaded guidewire as a derotation and fracture stabilization pin across the fracture site into the head.  This maintained the reduction.    I next drilled, measured, and passed my cephalad screw confirming appropriate position on AP and lateral fluoroscopy.  I then removed the derotation wire and compressed across this cephalad screw.  I placed the cephalad screw locking bolt, leaving a one quarter unturned to allow for active compression across the fracture site with the patient weightbearing. I then drilled and placed my distal interlock screw.    The operative sites  were irrigated, and then the fascia angela was closed over the primary nail insertion incision with #1 Vicryl.  The other incisions were closed with 2-0 subcutaneous Vicryl, and closed with skin glue at the end.  The patient was transferred off the [place] table, left emergent anesthesia which could without incident.  She was transferred to PACU in stable condition.       A surgical assistant was critical for this case to assist in retraction of the soft tissues, maintenance of reduction during instrumentation, and evacuating blood from the surgical field to facilitate a safer operation with improved visualization and less time under anesthesia.      All sponge and needle counts were correct at case conclusion.      POSTOPERATIVE PLAN:  -Activity:    Up with assist  -Weight Bearing Status:  WBAT   -Bracing:   None  -Antibiotics:     Ancef x24h  -Anticoagulation:   Lovenox x4 weeks  -Pain control:    IV and PO, wean to PO as able  -Dressing:    Ok to shower with dressings in place, dressing ok to get wet. Dressing removal after 5 days, may then shower normally but not immerse in water.  -Diet:     ADAT  -Labs:     AM Hgb, BMP    -Disposition:    Pending medical stability, PT, anticipate discharge around POD 2-4  -Follow up:     2 with repeat XR weeks in my clinic        Huan Feliciano MD

## 2020-10-11 NOTE — CONSULTS
Orthopedic Surgery Consult / History and Physical    David Hogan MRN# [id number]   Age: 75 year old YOB: 1945     Date of Admission:   10/10/2020  Date of Consult: 10/11/20   Location:    [facility]             Assessment and Plan:   Assessment:  Left intertrochanteric femur fracture     Plan:  1. IM nail left femur  2. I discussed the risks/benefits/alternatives of the operation with the patient, and she wishes to proceed.              Chief Complaint:   Left hip pain           History of Present Illness:   This patient is a 75 year old female with a significant past medical history of a myotonic dystrophy who presents with the above after a ground level fall caused by muscular incoordiation from the above diagnosis.    Fell directly on her left hip, no other sites of pain      Symptom Profile  Location of symptoms:   Left hip  Onset:   Time of fall yesterday  Quality of symptoms:   achy  Severity:   severe  Exacerbating:    Movement L hip  Alleviate:   rest            Past Medical History:     Past Medical History:   Diagnosis Date     Hypertension             Past Surgical History:     Past Surgical History:   Procedure Laterality Date     BACK SURGERY      2019     BIOPSY MUSCLE DIAGNOSTIC (LOCATION) Left 9/10/2020    Procedure: LEFT QUAD MUSCLE BIOPSY;  Surgeon: Yazan Chanel MD;  Location:  OR            Social History:   Living situation:  With   Ambulatory status:  independent    Social History     Tobacco Use     Smoking status: Former Smoker     Smokeless tobacco: Never Used   Substance Use Topics     Alcohol use: Not Currently            Family History:   Denies family history of bleeding or clotting disorders  History reviewed. No pertinent family history.         Allergies:     Allergies   Allergen Reactions     Amoxicillin      Hydrochlorothiazide W/Triamterene Other (See Comments)     Hyponatremia     Sertraline Other (See Comments)      Diarrhea            Medications:   Anticoagulants:  none  Medications Prior to Admission   Medication Sig Dispense Refill Last Dose     acetaminophen (TYLENOL) 500 MG tablet Take 500 mg by mouth 3 times daily    10/10/2020 at x1     ibuprofen (ADVIL/MOTRIN) 200 MG capsule Take 200 mg by mouth 3 times daily    10/10/2020 at x1     losartan (COZAAR) 25 MG tablet Take 12.5 mg by mouth daily as needed (for BP > 160/90)   Past Month at Unknown time     mexiletine (MEXITIL) 150 MG capsule Take 150 mg by mouth 2 times daily   10/10/2020 at am     oxybutynin ER (DITROPAN-XL) 5 MG 24 hr tablet Take 5 mg by mouth At Bedtime   10/9/2020 at Unknown time     predniSONE (DELTASONE) 5 MG tablet Take 25 mg by mouth twice a week on Saturday and Kenneth   10/10/2020 at am     Vitamin D, Cholecalciferol, 25 MCG (1000 UT) TABS Take 2 tablets by mouth daily   10/10/2020 at am     order for DME Equipment being ordered: Walker Wheels () and Walker ()  Treatment Diagnosis: Impaired functional mobility 1 each 0              Review of Systems:   A 10 point review of systems was performed, and was negative except as noted in HPI.         Physical Exam:     Patient Vitals for the past 8 hrs:   BP Temp Temp src Pulse SpO2   10/11/20 0951 (!) 171/85 99  F (37.2  C) Temporal -- 96 %   10/11/20 0855 -- 100.8  F (38.2  C) Temporal -- --   10/11/20 0854 (!) 172/76 97.1  F (36.2  C) Oral 89 --   10/11/20 0845 -- 96.1  F (35.6  C) Oral -- --   10/11/20 0742 (!) 166/72 100.8  F (38.2  C) Temporal 88 96 %       General: awake, alert, cooperative, no apparent distress, appears stated age  HEENT: normal  Respiratory: breathing non-labored  Cardiovascular: skin warm and well perfused  Skin: no rashes or lesions  Abdomen:  non-distended  Lymph:  No abnormal swelling of uninjured extremities  Heme:  No petechiae  Neurological: CN II-XII grossly intact  Musculoskeletal:     LLE   Skin C/D/I   Motor: EHL, TA, FHL, GS 3/5 - reports this is baseline  secondary to her myotonic dystrophy   Sensation diminished on SP, DP, S, S, and T nerve territories - reports this has been progressive prior to the trauma over the past several weeks   Circulation: palpable DP and TP, foot warm            Data:     Imaging:  XR L femur:   Intertrochanteric femur fracture            Huan Feliciano MD  Orthopaedic Spine Surgery  [facility]

## 2020-10-11 NOTE — PROGRESS NOTES
[facility]    Medicine Progress Note - Hospitalist Service       Date of Admission:  10/10/2020  Date of Service: 10/11/2020    Assessment & Plan     David Hogan is a 75 year old female with PMhx of osteoporosis, osteoarthritis, hypertension, hyponatremia, prior tobacco dependence, chronic back pain due to lumbar stenosis, who presents after a fall from standing height with left intertrochanteric femur fracture. The patient was admitted to inpatient status for further cares and surgical fixation.       #Left intertrochanteric femur fracture  Sustained after fall. XR on admisison demonstrated left femur intertrochanteric fracture with mild anterior apex angulation. On admission well controlled pain and without physical exam findings of neurovascular compromise. The patient has history of osteoporosis for which she is been recommended Prolia.  Prior bone density scans have demonstrated a  T-score at the hip of -2.3. Orthopedic consulted on admission.     -- S/P Procedure(s):  OPEN REDUCTION INTERNAL FIXATION, FRACTURE, LEFT  FEMUR, USING INTRAMEDULLARY NAIL  -- Day of Surgery  --Continue alendronate, vitamin D supplement and recommended ongoing follow-up with primary care for osteoporosis  -- Post-op cares per orthopedics expertise       #Fall  Patient sustained a fall 10/9 after attempting to transfer out of a seated position, landing on her left side. Secondary survey without acute injury.  Recalls the events of the fall, denies LOC, head injury, or pre-syncopal symptoms. hx of orthostatic intolerance  -surgical fixation for fracture as below  -Will eventually require PT eval for TCU. SW consulted    #Concern for underlying neuro-muscular disorder.   Currently the patient is process of work-up for muscular disorder with Dr. Cagle of [place] [facility] with a working diagnosis of Myotonic dystrophy per report. Her symptoms include significant intermittent lower extremity weakness  impairing her ambulation. She recently had a muscle biopsy on 9/10 with Dr. Chanel.  Is being treated with twice weekly doses of prednisone, 25 mg over the past 3 weeks.    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   -continue PTA prednisone      #Chronic Hyponatremia   Presented with a sodium of 131. Appears largely asymptomatic, with appropriate volume status. Doubt that her mild disturbance is causation for significant LE weakness and the above presentation.  She has history of chronic hyponatremia thought to be related to hypovolemia with sodium 129-131.  Had been directed to restrict fluids but this was complicated by orthostatic intolerance.  No salt restrictions in the outpatient setting. She does report alcohol intake, does not appear to be on sodium lowering medications as contribution.  -Monitor Na, at 132 today      Chronic Medical Conditions   #HTN  Hypertensive in the ED likely secondary to pain.  Patient has a history of hypertension with orthostatic hypotension.  She is prescribed as needed doses of antihypertensive therapy.  Continue to monitor blood pressure and treat pain as noted above.  -- Resume PRN losartan      DVT Prophylaxis: post-op per orthopedics   Riddle Catheter: in place, indication:   post-op   Code Status: Full Code      Disposition Plan   Expected discharge: 2 - 3 days, pending recovery from surgery. Given her underlying myotonic dystrophy, I think ARU will be a reasonable consideration. Otherwise will need TCU. Will request SW consult   Entered: Amor Keita MD 10/11/2020, 4:01 PM       The patient's care was discussed with the Bedside Nurse and Patient.    Amor Keita MD  Hospitalist Service  [facility]    ______________________________________________________________________    Interval History   Chart reviewed and patient seen. Case discussed with nursing staff.     Patient seen in PACU area, was gone  for surgery most of the day, currently feels well, post-op pain is under good control, Denies any chest pain, shortness of breath. No reports of abdominal pain or vomiting. Worried about her recovery with the muscular weakness, No new complaints voiced otherwise.     Data reviewed today: I reviewed all medications, new labs and imaging results over the last 24 hours. I personally reviewed    Physical Exam   Vital Signs: Temp: 98.4  F (36.9  C) Temp src: Temporal BP: 133/75 Pulse: 91   Resp: 14 SpO2: 96 % O2 Device: Nasal cannula Oxygen Delivery: 4 LPM     GENERAL:  Awake and alert, No acute distress.  PSYCH: no acute agitation   HEENT:  Neck is Supple, trachea is midline, EOMI, conjunctiva clear  CARDIOVASCULAR: Regular rate and rhythm, Normal S1, S2, no loud murmurs, no rubs or gallops.   PULMONARY:  Clear to auscultation bilaterally. Good air entry on both sides  GI: Abdomen is soft, non tender, non-distended, No rebound or guarding   SKIN:  No cyanosis or clubbing, no obvious exanthems on exposed areas   MSK: Extremities are warm and well perfused. No pitting edema   Neuro: Awake and oriented to situation, lower extremity weakness     Data   Recent Labs   Lab 10/11/20  0703 10/10/20  1407 10/10/20  1155   WBC 13.0*  --  8.1   HGB 10.4*  --  11.6*   MCV 98  --  97     --  360   INR  --  0.95  --    *  --  131*   POTASSIUM 4.3  --  5.0   CHLORIDE 102  --  99   CO2 24  --  25   BUN 25  --  13   CR 0.72  --  0.64   ANIONGAP 6  --  7   ANN-MARIE 8.7  --  8.7   GLC 89  --  93       Recent Results (from the past 24 hour(s))   XR Surgery XIOMY L/T 5 Min Fluoro w Stills    Narrative    This exam was marked as non-reportable because it will not be read by a   radiologist or a [place] non-radiologist provider.           Medications     sodium chloride Stopped (10/11/20 1234)       mexiletine  150 mg Oral BID     predniSONE  25 mg Oral Once per day on Sun Sat     sodium chloride (PF)  3 mL Intracatheter Q8H

## 2020-10-11 NOTE — ANESTHESIA POSTPROCEDURE EVALUATION
Patient: David Hogan    Procedure(s):  OPEN REDUCTION INTERNAL FIXATION, FRACTURE, LEFT  FEMUR, USING INTRAMEDULLARY NAIL    Diagnosis:Closed fracture of left hip, initial encounter (H) [S72.002A]  Diagnosis Additional Information: No value filed.    Anesthesia Type:  General    Note:  Anesthesia Post Evaluation    Patient location during evaluation: PACU  Patient participation: Able to fully participate in evaluation  Level of consciousness: awake  Pain management: adequate  multimodal analgesia used between 6 hours prior to anesthesia start to PACU dischargeAirway patency: patent  Cardiovascular status: acceptable  Respiratory status: acceptable  Hydration status: acceptable  PONV: none             Last vitals:  Vitals:    10/11/20 1425 10/11/20 1430 10/11/20 1445   BP: (!) 145/74 (!) 145/87 (!) 145/74   Pulse: 84 82 84   Resp: 20 17 18   Temp:      SpO2: 93% 95% 98%         Electronically Signed By: Micah Ro MD  October 11, 2020  3:05 PM

## 2020-10-11 NOTE — ANESTHESIA PREPROCEDURE EVALUATION
Anesthesia Pre-Procedure Evaluation    Patient: David Hogan   MRN: [id number] : 1945          Preoperative Diagnosis: Closed fracture of left hip, initial encounter (H) [S72.002A]    Procedure(s):  OPEN REDUCTION INTERNAL FIXATION, FRACTURE, FEMUR, USING INTRAMEDULLARY NAIL    Past Medical History:   Diagnosis Date     Hypertension      Past Surgical History:   Procedure Laterality Date     BACK SURGERY       BIOPSY MUSCLE DIAGNOSTIC (LOCATION) Left 9/10/2020    Procedure: LEFT QUAD MUSCLE BIOPSY;  Surgeon: Yazan Chanel MD;  Location:  OR     Anesthesia Evaluation     . Pt has had prior anesthetic. Type: General    No history of anesthetic complications          ROS/MED HX    ENT/Pulmonary:     (+)tobacco use, Current use .25-.5 packs/day  , . .    Neurologic:  - neg neurologic ROS     Cardiovascular:     (+) hypertension----. : . . . :. .       METS/Exercise Tolerance:     Hematologic:  - neg hematologic  ROS       Musculoskeletal:   (+) arthritis, fracture lower extremity: Hip, -       GI/Hepatic:  - neg GI/hepatic ROS       Renal/Genitourinary:  - ROS Renal section negative       Endo:     (+) Other Endocrine Disorder mild chronic hyponatremia.  unsuccessful treatment with fluid restriction leading to orthostatic hypotension.      Psychiatric:  - neg psychiatric ROS       Infectious Disease:  - neg infectious disease ROS       Malignancy:      - no malignancy   Other:    - neg other ROS                      Physical Exam  Normal systems: cardiovascular and pulmonary    Airway   Mallampati: I  TM distance: >3 FB  Neck ROM: full    Dental   (+) missing    Cardiovascular   Rhythm and rate: regular and normal      Pulmonary    breath sounds clear to auscultation    Other findings: Lab Test        10/10/20     10/10/20     08/11/20     07/20/19                       1407          1155          1326          0614          WBC           --          8.1          8.0          6.8           HGB            --          11.6*        12.6         10.9*         MCV           --          97           96           94            PLT           --          360          350          472*          INR          0.95          --           --           --            Lab Test        10/10/20     08/11/20     07/20/19                       1155          1326          0614          NA           131*         129*         131*          POTASSIUM    5.0          4.1          3.9           CHLORIDE     99           97           100           CO2          25           26           26            BUN          13           16           7             CR           0.64         0.61         0.41*         ANIONGAP     7            6            5             ANN-MARIE          8.7          9.0          8.6           GLC          93           90           91                     ECG  Sinus rhythm with short IL   Rate 72 bpm. IL interval 100. QRS duration 90. QT/QTc 384/420. P-R-T axes 71 65 72.  No significant change when compared to EKG dated 7/17/2019.        Lab Results   Component Value Date    WBC 8.1 10/10/2020    HGB 11.6 (L) 10/10/2020    HCT 36.8 10/10/2020     10/10/2020    SED 12 08/11/2020     (L) 10/10/2020    POTASSIUM 5.0 10/10/2020    CHLORIDE 99 10/10/2020    CO2 25 10/10/2020    BUN 13 10/10/2020    CR 0.64 10/10/2020    GLC 93 10/10/2020    ANN-MARIE 8.7 10/10/2020    ALT 23 08/11/2020    AST 18 08/11/2020    BILITOTAL 0.3 08/11/2020    PTT 28 10/10/2020    INR 0.95 10/10/2020    TSH 1.16 07/17/2019       Preop Vitals  BP Readings from Last 3 Encounters:   10/10/20 (!) 157/72   09/10/20 133/74   07/20/19 137/74    Pulse Readings from Last 3 Encounters:   10/10/20 79   07/20/19 76      Resp Readings from Last 3 Encounters:   10/10/20 18   09/10/20 16   07/20/19 16    SpO2 Readings from Last 3 Encounters:   10/10/20 97%   09/10/20 100%   07/20/19 97%      Temp Readings from Last 1 Encounters:   10/10/20 97.7  F (36.5  C)  "(Temporal)    Ht Readings from Last 1 Encounters:   09/10/20 1.626 m (5' 4\")      Wt Readings from Last 1 Encounters:   09/10/20 61.4 kg (135 lb 6.4 oz)    Estimated body mass index is 23.24 kg/m  as calculated from the following:    Height as of 9/10/20: 1.626 m (5' 4\").    Weight as of 9/10/20: 61.4 kg (135 lb 6.4 oz).       Anesthesia Plan      History & Physical Review  History and physical reviewed and following examination; no interval change.    ASA Status:  3 .    NPO Status:  > 8 hours    Plan for General with Intravenous induction. Maintenance will be Balanced.    PONV prophylaxis:  Ondansetron (or other 5HT-3) and Dexamethasone or Solumedrol  Always some cardiac risk associated with embolic events surrounding procedure.   The patient is not a current smoker      Postoperative Care  Postoperative pain management:  IV analgesics, Oral pain medications and Multi-modal analgesia.      Consents  Anesthetic plan, risks, benefits and alternatives discussed with:  Patient.  Use of blood products discussed: No .   .                 Micah Ro MD                    .  "

## 2020-10-11 NOTE — ANESTHESIA PROCEDURE NOTES
Airway   Date/Time: 10/11/2020 11:43 AM   Patient location during procedure: OR    Staff -   Anesthesiologist:  Micah Ro MD  CRNA: Herminia Allen APRN CRNA  Performed By: anesthesiologist    Indications and Patient Condition  Indications for airway management: zachary-procedural  Mask difficulty assessment: 1 - vent by mask    Final Airway Details  Final airway type: supraglottic airway        Post intubation assessment   Placement verified by: capnometry   Number of attempts at approach: 1  Ease of procedure: easy  Dentition: Intact

## 2020-10-12 ENCOUNTER — APPOINTMENT (OUTPATIENT)
Dept: PHYSICAL THERAPY | Facility: CLINIC | Age: 75
DRG: 481 | End: 2020-10-12
Attending: PHYSICIAN ASSISTANT
Payer: MEDICARE

## 2020-10-12 LAB
ANION GAP SERPL CALCULATED.3IONS-SCNC: 7 MMOL/L (ref 3–14)
BUN SERPL-MCNC: 15 MG/DL (ref 7–30)
CALCIUM SERPL-MCNC: 8.1 MG/DL (ref 8.5–10.1)
CHLORIDE SERPL-SCNC: 99 MMOL/L (ref 94–109)
CO2 SERPL-SCNC: 24 MMOL/L (ref 20–32)
CREAT SERPL-MCNC: 0.57 MG/DL (ref 0.52–1.04)
GFR SERPL CREATININE-BSD FRML MDRD: 90 ML/MIN/{1.73_M2}
GLUCOSE SERPL-MCNC: 82 MG/DL (ref 70–99)
HGB BLD-MCNC: 8.6 G/DL (ref 11.7–15.7)
INTERPRETATION ECG - MUSE: NORMAL
POTASSIUM SERPL-SCNC: 4 MMOL/L (ref 3.4–5.3)
SODIUM SERPL-SCNC: 130 MMOL/L (ref 133–144)

## 2020-10-12 PROCEDURE — 97161 PT EVAL LOW COMPLEX 20 MIN: CPT | Mod: GP | Performed by: PHYSICAL THERAPIST

## 2020-10-12 PROCEDURE — 80048 BASIC METABOLIC PNL TOTAL CA: CPT | Performed by: PHYSICIAN ASSISTANT

## 2020-10-12 PROCEDURE — 250N000013 HC RX MED GY IP 250 OP 250 PS 637: Performed by: PHYSICIAN ASSISTANT

## 2020-10-12 PROCEDURE — 96372 THER/PROPH/DIAG INJ SC/IM: CPT | Performed by: PHYSICIAN ASSISTANT

## 2020-10-12 PROCEDURE — 250N000012 HC RX MED GY IP 250 OP 636 PS 637: Performed by: INTERNAL MEDICINE

## 2020-10-12 PROCEDURE — 97530 THERAPEUTIC ACTIVITIES: CPT | Mod: GP | Performed by: PHYSICAL THERAPIST

## 2020-10-12 PROCEDURE — 250N000011 HC RX IP 250 OP 636: Performed by: PHYSICIAN ASSISTANT

## 2020-10-12 PROCEDURE — 99232 SBSQ HOSP IP/OBS MODERATE 35: CPT | Performed by: INTERNAL MEDICINE

## 2020-10-12 PROCEDURE — 250N000013 HC RX MED GY IP 250 OP 250 PS 637: Performed by: INTERNAL MEDICINE

## 2020-10-12 PROCEDURE — 250N000009 HC RX 250: Performed by: PHYSICIAN ASSISTANT

## 2020-10-12 PROCEDURE — 36415 COLL VENOUS BLD VENIPUNCTURE: CPT | Performed by: PHYSICIAN ASSISTANT

## 2020-10-12 PROCEDURE — 120N000001 HC R&B MED SURG/OB

## 2020-10-12 PROCEDURE — 85018 HEMOGLOBIN: CPT | Performed by: PHYSICIAN ASSISTANT

## 2020-10-12 RX ORDER — AMOXICILLIN 250 MG
1 CAPSULE ORAL 2 TIMES DAILY PRN
Qty: 40 TABLET | Refills: 0 | Status: ON HOLD | DISCHARGE
Start: 2020-10-12 | End: 2023-11-11

## 2020-10-12 RX ORDER — POLYETHYLENE GLYCOL 3350 17 G/17G
17 POWDER, FOR SOLUTION ORAL DAILY PRN
Qty: 24 PACKET | Refills: 0 | DISCHARGE
Start: 2020-10-12

## 2020-10-12 RX ORDER — OXYCODONE HYDROCHLORIDE 5 MG/1
5 TABLET ORAL EVERY 4 HOURS PRN
Qty: 30 TABLET | Refills: 0 | Status: ON HOLD | OUTPATIENT
Start: 2020-10-12 | End: 2023-11-11

## 2020-10-12 RX ORDER — ONDANSETRON 4 MG/1
4 TABLET, ORALLY DISINTEGRATING ORAL EVERY 6 HOURS PRN
Qty: 10 TABLET | Refills: 0 | Status: ON HOLD | DISCHARGE
Start: 2020-10-12 | End: 2023-11-11

## 2020-10-12 RX ADMIN — CLINDAMYCIN PHOSPHATE 600 MG: 600 INJECTION, SOLUTION INTRAVENOUS at 02:22

## 2020-10-12 RX ADMIN — POLYETHYLENE GLYCOL 3350 17 G: 17 POWDER, FOR SOLUTION ORAL at 08:49

## 2020-10-12 RX ADMIN — DOCUSATE SODIUM AND SENNOSIDES 1 TABLET: 8.6; 5 TABLET ORAL at 20:37

## 2020-10-12 RX ADMIN — ENOXAPARIN SODIUM 40 MG: 40 INJECTION SUBCUTANEOUS at 08:45

## 2020-10-12 RX ADMIN — PREDNISONE 25 MG: 5 TABLET ORAL at 13:39

## 2020-10-12 RX ADMIN — DOCUSATE SODIUM AND SENNOSIDES 1 TABLET: 8.6; 5 TABLET ORAL at 08:45

## 2020-10-12 RX ADMIN — MEXILETINE HYDROCHLORIDE 150 MG: 150 CAPSULE ORAL at 08:45

## 2020-10-12 RX ADMIN — DOCUSATE SODIUM 100 MG: 100 CAPSULE, LIQUID FILLED ORAL at 20:37

## 2020-10-12 RX ADMIN — ACETAMINOPHEN 975 MG: 325 TABLET, FILM COATED ORAL at 08:46

## 2020-10-12 RX ADMIN — ACETAMINOPHEN 975 MG: 325 TABLET, FILM COATED ORAL at 17:40

## 2020-10-12 RX ADMIN — MEXILETINE HYDROCHLORIDE 150 MG: 150 CAPSULE ORAL at 20:37

## 2020-10-12 RX ADMIN — DOCUSATE SODIUM 100 MG: 100 CAPSULE, LIQUID FILLED ORAL at 08:46

## 2020-10-12 ASSESSMENT — ACTIVITIES OF DAILY LIVING (ADL)
ADLS_ACUITY_SCORE: 16
ADLS_ACUITY_SCORE: 17
ADLS_ACUITY_SCORE: 16
ADLS_ACUITY_SCORE: 17
ADLS_ACUITY_SCORE: 17
ADLS_ACUITY_SCORE: 16

## 2020-10-12 NOTE — PROGRESS NOTES
[facility]    Medicine Progress Note - Hospitalist Service       Date of Admission:  10/10/2020  Date of Service: 10/12/2020    Assessment & Plan     David Hogan is a 75 year old female with PMhx of osteoporosis, osteoarthritis, hypertension, hyponatremia, prior tobacco dependence, chronic back pain due to lumbar stenosis, who presents after a fall from standing height with left intertrochanteric femur fracture. The patient was admitted to inpatient status for further cares and surgical fixation.       #Left intertrochanteric femur fracture  Sustained after fall. XR on admisison demonstrated left femur intertrochanteric fracture with mild anterior apex angulation. On admission well controlled pain and without physical exam findings of neurovascular compromise. The patient has history of osteoporosis for which she is been recommended Prolia.  Prior bone density scans have demonstrated a  T-score at the hip of -2.3. Orthopedic consulted on admission.     -- S/P Procedure(s):  OPEN REDUCTION INTERNAL FIXATION, FRACTURE, LEFT  FEMUR, USING INTRAMEDULLARY NAIL  -- 1 Day Post-Op  --Continue ongoing follow-up with primary care for osteoporosis  --Post-op cares per orthopedics expertise  --Anticipate slow improvement given her underlying muscular dystrophy     #Fall  Patient sustained a fall 10/9 after attempting to transfer out of a seated position, landing on her left side. Secondary survey without acute injury.  Recalls the events of the fall, denies LOC, head injury, or pre-syncopal symptoms. hx of orthostatic intolerance  -PT/OT eval     #Concern for underlying neuro-muscular disorder.   Currently the patient is process of work-up for muscular disorder with Dr. Cagle of [place] [facility] with a working diagnosis of Myotonic dystrophy per report. Her symptoms include significant intermittent lower extremity weakness impairing her ambulation. She recently had a muscle biopsy on 9/10  with Dr. Chanel.  Is being treated with twice weekly doses of prednisone, 25 mg over the past 3 weeks.    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   -continue PTA prednisone and Mexitil      #Chronic Hyponatremia   Presented with a sodium of 131. Appears largely asymptomatic, with appropriate volume status. Doubt that her mild disturbance is causation for significant LE weakness and the above presentation.  She has history of chronic hyponatremia thought to be related to hypovolemia with sodium 129-131.  Had been directed to restrict fluids but this was complicated by orthostatic intolerance.  No salt restrictions in the outpatient setting. She does report alcohol intake, does not appear to be on sodium lowering medications as contribution.  -Monitor Na     # IV infiltration. Right arm. Was infusion LR, mild swelling, no signs of infection, improving, supportive measures      Chronic Medical Conditions   #HTN  Hypertensive in the ED likely secondary to pain.  Patient has a history of hypertension with orthostatic hypotension.  She is prescribed as needed doses of antihypertensive therapy.  Continue to monitor blood pressure and treat pain as noted above.  -- Resume PRN losartan      DVT Prophylaxis: post-op per orthopedics   Riddle Catheter: not present post-op   Code Status: Full Code      Disposition Plan   Expected discharge: 2 - 3 days, pending recovery from surgery. TCU vs ARU. SW consulted   Entered: Amor Keita MD 10/12/2020, 1:10 PM       The patient's care was discussed with the Bedside Nurse and Patient.     updated at bedside     Amor Keita MD  Hospitalist Service  [facility]    ______________________________________________________________________    Interval History   Chart reviewed and patient seen. Case discussed with nursing staff.     Patient doing well, had issues with confusion last night, better mentation  today, pain is ok, feels weak, limited participation with therapy, Denies any chest pain, shortness of breath. No reports of abdominal pain or vomiting. No new complaints voiced otherwise.     Data reviewed today: I reviewed all medications, new labs and imaging results over the last 24 hours. I personally reviewed    Physical Exam   Vital Signs: Temp: 98.5  F (36.9  C) Temp src: Temporal BP: 120/58 Pulse: 88   Resp: 16 SpO2: 93 % O2 Device: None (Room air) Oxygen Delivery: 2 LPM     GENERAL:  Awake and alert, No acute distress.  PSYCH: no acute agitation   HEENT:  Neck is Supple, trachea is midline, EOMI, conjunctiva clear  CARDIOVASCULAR: Regular rate and rhythm, Normal S1, S2, no loud murmurs, no rubs or gallops.   PULMONARY:  Clear to auscultation bilaterally. Good air entry on both sides  GI: Abdomen is soft, non tender, non-distended, No rebound or guarding   SKIN:  No cyanosis or clubbing, no obvious exanthems on exposed areas   MSK: Extremities are warm and well perfused. No pitting edema, mild swelling in right arm   Neuro: Awake and oriented to situation, lower extremity weakness, confusion is better     Data   Recent Labs   Lab 10/12/20  0601 10/11/20  1652 10/11/20  0703 10/10/20  1407 10/10/20  1155   WBC  --   --  13.0*  --  8.1   HGB 8.6*  --  10.4*  --  11.6*   MCV  --   --  98  --  97   PLT  --   --  298  --  360   INR  --   --   --  0.95  --    *  --  132*  --  131*   POTASSIUM 4.0  --  4.3  --  5.0   CHLORIDE 99  --  102  --  99   CO2 24  --  24  --  25   BUN 15  --  25  --  13   CR 0.57 0.64 0.72  --  0.64   ANIONGAP 7  --  6  --  7   ANN-MARIE 8.1*  --  8.7  --  8.7   GLC 82  --  89  --  93       Recent Results (from the past 24 hour(s))   XR Surgery XIOMY L/T 5 Min Fluoro w Stills    Narrative    This exam was marked as non-reportable because it will not be read by a   radiologist or a [place] non-radiologist provider.           Medications     lactated ringers Stopped (10/12/20 0840)      sodium chloride Stopped (10/11/20 1234)       acetaminophen  975 mg Oral Q8H     docusate sodium  100 mg Oral BID     enoxaparin ANTICOAGULANT  40 mg Subcutaneous Q24H     mexiletine  150 mg Oral BID     polyethylene glycol  17 g Oral Daily     predniSONE  25 mg Oral Once     predniSONE  25 mg Oral Once per day on Sun Sat     senna-docusate  1 tablet Oral BID     sodium chloride (PF)  3 mL Intracatheter Q8H     sodium chloride (PF)  3 mL Intracatheter Q8H

## 2020-10-12 NOTE — PROGRESS NOTES
Orthopedic Surgery  David Hogna  10/12/2020  Admit Date:  10/10/2020  POD 1 Day Post-Op  S/P Procedure(s):  Open reduction internal fixation left intertrochanteric femur fracture with an intramedullary nail    Patient resting comfortably in bed.    Pain controlled.  Tolerating oral intake.    Denies nausea or vomiting  Denies chest pain or shortness of breath  Combative and uncooperative overnight     Alert and orient to person  Vital Sign Ranges  Temperature Temp  Av.9  F (37.2  C)  Min: 98.4  F (36.9  C)  Max: 99.4  F (37.4  C)   Blood pressure Systolic (24hrs), Av , Min:106 , Max:176        Diastolic (24hrs), Av, Min:50, Max:122      Pulse Pulse  Av.7  Min: 49  Max: 103   Respirations Resp  Av.7  Min: 13  Max: 25   Pulse oximetry SpO2  Av.8 %  Min: 90 %  Max: 100 %       Dressing is clean, dry, and intact.   Minimal erythema of the surrounding skin.   Bilateral calves are soft, non-tender.  Bilateral lower extremity is NVI.  Sensation intact bilateral lower extremities  5/5 motor with resisted dorsi and plantar flexion bilaterally  +Dp pulse    Labs:  Recent Labs   Lab Test 10/12/20  0601 10/11/20  0703 10/10/20  1155   POTASSIUM 4.0 4.3 5.0     Recent Labs   Lab Test 10/12/20  0601 10/11/20  0703 10/10/20  1155   HGB 8.6* 10.4* 11.6*     Recent Labs   Lab Test 10/10/20  1407   INR 0.95     Recent Labs   Lab Test 10/11/20  0703 10/10/20  1155 20  1326    360 350       A/P  1. S/p left intertrochanteric femur fracture    Continue Lovenox for DVT prophylaxis x 4 weeks.     Mobilize with PT/OT    WBAT with walker.   Leave dressing intact     Continue current pain regiment.    2. Disposition   Anticipate d/c to TCU based on progress with PT and medical clearance.    Karly Herbert PA-C

## 2020-10-12 NOTE — CONSULTS
.  Care Management Initial Consult    General Information   ,    Type of CM/SW Visit: Offer D/C Planning           Reason for Consult: discharge planning  Advance Care Planning:       General Information Comments:  not currently in room, unavailable by phone. Will want to include him with discharge planning as it is noted that pt has periods of disorientation. Noted plan for TCU, or per MD possible ARU.     SW will follow-up tomorrow with pt/ to initiate discharge planning.       .    BARRY Parisi   Inpatient Care Coordination   [facility]     [phone]

## 2020-10-13 ENCOUNTER — APPOINTMENT (OUTPATIENT)
Dept: PHYSICAL THERAPY | Facility: CLINIC | Age: 75
DRG: 481 | End: 2020-10-13
Payer: MEDICARE

## 2020-10-13 LAB
GLUCOSE SERPL-MCNC: 88 MG/DL (ref 70–99)
HGB BLD-MCNC: 9 G/DL (ref 11.7–15.7)

## 2020-10-13 PROCEDURE — 36415 COLL VENOUS BLD VENIPUNCTURE: CPT | Performed by: PHYSICIAN ASSISTANT

## 2020-10-13 PROCEDURE — 250N000013 HC RX MED GY IP 250 OP 250 PS 637: Performed by: PHYSICIAN ASSISTANT

## 2020-10-13 PROCEDURE — 120N000001 HC R&B MED SURG/OB

## 2020-10-13 PROCEDURE — 250N000011 HC RX IP 250 OP 636: Performed by: PHYSICIAN ASSISTANT

## 2020-10-13 PROCEDURE — 85018 HEMOGLOBIN: CPT | Performed by: PHYSICIAN ASSISTANT

## 2020-10-13 PROCEDURE — 258N000003 HC RX IP 258 OP 636: Performed by: INTERNAL MEDICINE

## 2020-10-13 PROCEDURE — 97530 THERAPEUTIC ACTIVITIES: CPT | Mod: GP | Performed by: PHYSICAL THERAPY ASSISTANT

## 2020-10-13 PROCEDURE — 99233 SBSQ HOSP IP/OBS HIGH 50: CPT | Performed by: INTERNAL MEDICINE

## 2020-10-13 PROCEDURE — 82947 ASSAY GLUCOSE BLOOD QUANT: CPT | Performed by: PHYSICIAN ASSISTANT

## 2020-10-13 PROCEDURE — 250N000013 HC RX MED GY IP 250 OP 250 PS 637: Performed by: INTERNAL MEDICINE

## 2020-10-13 RX ADMIN — ACETAMINOPHEN 650 MG: 325 TABLET, FILM COATED ORAL at 16:17

## 2020-10-13 RX ADMIN — DOCUSATE SODIUM AND SENNOSIDES 1 TABLET: 8.6; 5 TABLET ORAL at 09:10

## 2020-10-13 RX ADMIN — MEXILETINE HYDROCHLORIDE 150 MG: 150 CAPSULE ORAL at 09:11

## 2020-10-13 RX ADMIN — MEXILETINE HYDROCHLORIDE 150 MG: 150 CAPSULE ORAL at 20:05

## 2020-10-13 RX ADMIN — ACETAMINOPHEN 975 MG: 325 TABLET, FILM COATED ORAL at 09:09

## 2020-10-13 RX ADMIN — DOCUSATE SODIUM 100 MG: 100 CAPSULE, LIQUID FILLED ORAL at 20:05

## 2020-10-13 RX ADMIN — SODIUM CHLORIDE: 9 INJECTION, SOLUTION INTRAVENOUS at 16:16

## 2020-10-13 RX ADMIN — ENOXAPARIN SODIUM 40 MG: 40 INJECTION SUBCUTANEOUS at 09:10

## 2020-10-13 RX ADMIN — DOCUSATE SODIUM 100 MG: 100 CAPSULE, LIQUID FILLED ORAL at 10:12

## 2020-10-13 RX ADMIN — POLYETHYLENE GLYCOL 3350 17 G: 17 POWDER, FOR SOLUTION ORAL at 09:11

## 2020-10-13 ASSESSMENT — ACTIVITIES OF DAILY LIVING (ADL)
ADLS_ACUITY_SCORE: 17
ADLS_ACUITY_SCORE: 17
ADLS_ACUITY_SCORE: 21
ADLS_ACUITY_SCORE: 17
ADLS_ACUITY_SCORE: 17
ADLS_ACUITY_SCORE: 19

## 2020-10-13 NOTE — PROGRESS NOTES
[facility] Trauma RN Coordinator:     Writer noted TCU/ARU recommended and  currently in the process of working with spouse on discharge plan.    Plan - will continue to follow along with  plan.     Meg Leach RN  [facility] Trauma Coordinator    [facility]   [place]   [address] [place] [address]  T: [phone]  C: [phone]  F: [phone]  E: [email or web address]

## 2020-10-13 NOTE — PROGRESS NOTES
Orthopedic Surgery  David Hogan  10/13/2020  Admit Date:  10/10/2020  POD: 2 Days Post-Op   Procedure(s):  Open reduction internal fixation left intertrochanteric femur fracture with an intramedullary nail    Patient resting comfortably in bed.    Pain controlled.  Tolerating oral intake.    Denies nausea or vomiting  Denies chest pain or shortness of breath    Vital Sign Ranges  Temperature Temp  Av.1  F (36.7  C)  Min: 97.6  F (36.4  C)  Max: 98.8  F (37.1  C)   Blood pressure Systolic (24hrs), Av , Min:144 , Max:171        Diastolic (24hrs), Av, Min:66, Max:82      Pulse Pulse  Av.5  Min: 78  Max: 95   Respirations Resp  Av  Min: 16  Max: 16   Pulse oximetry SpO2  Av.7 %  Min: 94 %  Max: 95 %       Dressing is clean, dry, and intact. Thigh swelling present.   Minimal erythema of the surrounding skin.   Bilateral calves are soft, non-tender.  Left lower extremity is NVI.  Sensation intact bilateral lower extremities  Patient able to resist dorsi and plantar flexion bilaterally  +Dp pulse    Labs:  Recent Labs   Lab Test 10/12/20  0601 10/11/20  0703 10/10/20  1155   POTASSIUM 4.0 4.3 5.0     Recent Labs   Lab Test 10/13/20  0645 10/12/20  0601 10/11/20  0703   HGB 9.0* 8.6* 10.4*     Recent Labs   Lab Test 10/10/20  1407   INR 0.95     Recent Labs   Lab Test 10/11/20  0703 10/10/20  1155 20  1326    360 350        A/P  1. S/p left intertrochanteric femur fracture               Continue Lovenox for DVT prophylaxis x 4 weeks.                Mobilize with PT/OT               WBAT with walker.              Leave dressing intact                Continue current pain regiment.     2. Disposition              Anticipate d/c to TCU based on progress with PT and medical clearance.    Marie Horner PA-C

## 2020-10-13 NOTE — PROGRESS NOTES
Lethargic. Confused. Disoriented to situation, time. Afebrile. Reports pain to L hip. Dressing to L hip intact. CMS intact. BP slightly elevated in AM. Hypotensive episode when sitting bedside. BP improved when pt was returned to supine position on bed. LS clear, sats mid 90s on room air. Low UOP. 50mL vernon urine from purewik, one saturated brief for this shift. No BM, + BS. Good appetite.

## 2020-10-13 NOTE — PROGRESS NOTES
[facility]    Hospitalist Progress Note      Assessment & Plan   David Hogan is a 75 year old female who was admitted on 10/10/2020.    Summary of Stay:   David Hogan is a 75 year old female with PMhx of osteoporosis, osteoarthritis, hypertension, hyponatremia, prior tobacco dependence, chronic back pain due to lumbar stenosis, who presents after a fall from standing height with left intertrochanteric femur fracture. The patient was admitted to inpatient status for further cares and surgical fixation.    Underwent surgery on 10/11/2020.    This morning, patient developed significant orthostatic hypotension and was very symptomatic.    Per , poor oral intake.    Plan:    Left intertrochanteric hip fracture  - Status post surgery.  -Continue with OT and PT.  - Will benefit from transitional care unit for further rehab.  -On Lovenox for DVT prophylaxis.    Orthostatic hypotension  - Per , this is an ongoing issue which has happened episodically at home.  - When patient was up with the help of nursing staff, she became very symptomatic and almost fainted.  Dramatically improved after laying down.  - When I evaluated her, she appeared somewhat somnolent.  She has not gotten any narcotics in the last 24 hours.  - Per , poor oral intake.  Requires a lot of prompting.  - We will keep the patient on IV normal saline overnight.  - Reassess symptoms, vitals and labs in the morning.    Chronic hyponatremia  - Chronically low sodium based on labs available since July 2019.  -IV normal saline for volume depletion as described above.  - On chronic prednisone therapy, 25 mg twice weekly.    Myotonic dystrophy  - Being seen by Dr. Cagle from [place] on neurology in the outpatient setting.  -On prednisone 25 mg twice weekly and on Mexitil.  -Continue OT and PT.    Acute blood loss anemia  -Post surgery.  Stabilized.    Updated .      DVT Prophylaxis: Enoxaparin  (Lovenox) SQ  Code Status: Full Code  Expected discharge: 1-2 days    Sam Suazo MD  [employer] Page (7am - 6pm, M-F)    Interval History   Patient was evaluated with nursing staff. Overnight issues discussed.    Review of systems:  No nausea or vomiting.  No abdominal pain.  No diarrhea.  No chest pain/palpitations.  No new cough/shortness of breath.  No headache/visual disturbance/new weakness.    -Data reviewed today: Labs and medications.    Physical Exam   Temp: 97.9  F (36.6  C) Temp src: Temporal BP: (!) 147/70 Pulse: 78   Resp: 16 SpO2: 95 % O2 Device: None (Room air)    There were no vitals filed for this visit.  Vital Signs with Ranges  Temp:  [97.9  F (36.6  C)-98.8  F (37.1  C)] 97.9  F (36.6  C)  Pulse:  [78-95] 78  Resp:  [16] 16  BP: (147-171)/(66-82) 147/70  SpO2:  [95 %] 95 %  I/O last 3 completed shifts:  In: 440 [P.O.:440]  Out: 1725 [Urine:1725]    Constitutional: Appears tired.  Wakes up to voice easily.  HEENT: Trachea midline, sclera is clear, dry oral mucous membranes.  Respiratory: No crackles. No wheezing. Equal breath sounds bilaterally.  Cardiovascular: Regular rate and rhythm, normal S1 and S2, and no murmur noted  GI: Normal bowel sounds, soft, non-distended, non-tender  Extremities: No pitting edema     Medications     sodium chloride Stopped (10/11/20 1234)       acetaminophen  975 mg Oral Q8H     docusate sodium  100 mg Oral BID     enoxaparin ANTICOAGULANT  40 mg Subcutaneous Q24H     mexiletine  150 mg Oral BID     polyethylene glycol  17 g Oral Daily     predniSONE  25 mg Oral Once per day on Sun Sat     senna-docusate  1 tablet Oral BID     sodium chloride (PF)  3 mL Intracatheter Q8H     sodium chloride (PF)  3 mL Intracatheter Q8H       Data   Recent Labs   Lab 10/13/20  0645 10/12/20  0601 10/11/20  1652 10/11/20  0703 10/10/20  1407 10/10/20  1155   WBC  --   --   --  13.0*  --  8.1   HGB 9.0* 8.6*  --  10.4*  --  11.6*   MCV  --   --   --  98  --  97   PLT  --   --   --  298  --   360   INR  --   --   --   --  0.95  --    NA  --  130*  --  132*  --  131*   POTASSIUM  --  4.0  --  4.3  --  5.0   CHLORIDE  --  99  --  102  --  99   CO2  --  24  --  24  --  25   BUN  --  15  --  25  --  13   CR  --  0.57 0.64 0.72  --  0.64   ANIONGAP  --  7  --  6  --  7   ANN-MARIE  --  8.1*  --  8.7  --  8.7   GLC 88 82  --  89  --  93       No results found for this or any previous visit (from the past 24 hour(s)).

## 2020-10-13 NOTE — PROGRESS NOTES
.  Care Management Initial Consult    General Information  Assessment completed with:: Patient, Spouse or significant other, Patient, spouse  Type of CM/SW Visit: Initial Assessment           Reason for Consult: discharge planning  Advance Care Planning:   NA      Communication Assessment  Patient's communication style: spoken language (English or Bilingual)  Hearing Difficulty or Deaf: no Wear Glasses or Blind: yes    Cognitive  Cognitive/Neuro/Behavioral: .WDL except  Level of Consciousness: confused  Arousal Level: opens eyes spontaneously  Orientation: person, place, time  Mood/Behavior: cooperative, calm  Best Language: 0 - No aphasia  Speech: clear, word-finding difficulty    Living Environment:   People in home: spouse     Current living Arrangements: house          Family/Social Support:  Care provided by:    Provides care for:    Marital Status:   Who is your support system?:      Description of Support System: Involved, Supportive  Description of Support System: Involved, Supportive           Description of Support System: Involved, Supportive       Current Resources:   Skilled Home Care Services:  NA  Community Resources:    Equipment currently used at home: grab bar, tub/shower, shower chair, tub bench, walker, rolling  Supplies currently used at home:      Employment:  Employment Status:  retired     Financial/Environmental Concerns: (NA)          Functional Status:  Prior to admission patient needed assistance: ambulation at times         Mental Health Status:  WDL                            Values/Beliefs:  Spiritual, Cultural Beliefs, [employer] Practices, Values that affect care: (none identified)               Additional Information:     Met with pt and , they affirm planning for pt's tranfer to rehab facility on discharge. Discussed with them the opportunity for consideration of acute rehab vs sub-acute ( TCU).  notes that he is aware that if pt would discharge to acute  rehab that the therapy would be quite intensive, he informs that he doesn't feel that pt would be able to keep up with the intensity of acute rehab.       Discussed SNF/TCU, provided SNF listings and reviewed how to access Medicare SNF quality ratings. Based on discussion, referrals were made to [facility], [facility], [facility], [facility], [facility], and [facility]. Addressed additional questions, including Medicare/SNF coverage.        Referrals made.. [facility] and [facility] have no openings, [facility] has assessed and clinically able to accept pt but bed availability is undetermined. [facility] able to accept pt.      SW following, will await MD determination of discharge date continue planning accordingly. Will update pt/ regarding facility information noted above.            Sue Cooper, [facility] [place]   Inpatient Care Coordination   [facility]     [phone]

## 2020-10-13 NOTE — PROVIDER NOTIFICATION
MD paged. Pt had orthostatic episode at bedside with therapy. Pt returned to bed, VSS, answering questions at baseline.

## 2020-10-14 ENCOUNTER — APPOINTMENT (OUTPATIENT)
Dept: PHYSICAL THERAPY | Facility: CLINIC | Age: 75
DRG: 481 | End: 2020-10-14
Payer: MEDICARE

## 2020-10-14 VITALS
HEART RATE: 82 BPM | DIASTOLIC BLOOD PRESSURE: 55 MMHG | RESPIRATION RATE: 18 BRPM | OXYGEN SATURATION: 96 % | TEMPERATURE: 98.7 F | SYSTOLIC BLOOD PRESSURE: 147 MMHG

## 2020-10-14 LAB
ANION GAP SERPL CALCULATED.3IONS-SCNC: 7 MMOL/L (ref 3–14)
BUN SERPL-MCNC: 14 MG/DL (ref 7–30)
CALCIUM SERPL-MCNC: 7.7 MG/DL (ref 8.5–10.1)
CHLORIDE SERPL-SCNC: 103 MMOL/L (ref 94–109)
CO2 SERPL-SCNC: 24 MMOL/L (ref 20–32)
CREAT SERPL-MCNC: 0.55 MG/DL (ref 0.52–1.04)
GFR SERPL CREATININE-BSD FRML MDRD: >90 ML/MIN/{1.73_M2}
GLUCOSE SERPL-MCNC: 86 MG/DL (ref 70–99)
PLATELET # BLD AUTO: 279 10E9/L (ref 150–450)
POTASSIUM SERPL-SCNC: 3.7 MMOL/L (ref 3.4–5.3)
SODIUM SERPL-SCNC: 134 MMOL/L (ref 133–144)

## 2020-10-14 PROCEDURE — 258N000003 HC RX IP 258 OP 636: Performed by: INTERNAL MEDICINE

## 2020-10-14 PROCEDURE — 80048 BASIC METABOLIC PNL TOTAL CA: CPT | Performed by: PHYSICIAN ASSISTANT

## 2020-10-14 PROCEDURE — 99231 SBSQ HOSP IP/OBS SF/LOW 25: CPT | Performed by: INTERNAL MEDICINE

## 2020-10-14 PROCEDURE — 85049 AUTOMATED PLATELET COUNT: CPT | Performed by: PHYSICIAN ASSISTANT

## 2020-10-14 PROCEDURE — 250N000013 HC RX MED GY IP 250 OP 250 PS 637: Performed by: PHYSICIAN ASSISTANT

## 2020-10-14 PROCEDURE — 250N000013 HC RX MED GY IP 250 OP 250 PS 637: Performed by: INTERNAL MEDICINE

## 2020-10-14 PROCEDURE — 250N000011 HC RX IP 250 OP 636: Performed by: PHYSICIAN ASSISTANT

## 2020-10-14 PROCEDURE — 36415 COLL VENOUS BLD VENIPUNCTURE: CPT | Performed by: PHYSICIAN ASSISTANT

## 2020-10-14 PROCEDURE — 99207 PR CDG-CODE CATEGORY CHANGED: CPT | Performed by: INTERNAL MEDICINE

## 2020-10-14 PROCEDURE — 97530 THERAPEUTIC ACTIVITIES: CPT | Mod: GP | Performed by: PHYSICAL THERAPY ASSISTANT

## 2020-10-14 RX ORDER — OMEGA-3 FATTY ACIDS/FISH OIL 300-1000MG
200 CAPSULE ORAL EVERY 8 HOURS PRN
Status: ON HOLD | DISCHARGE
Start: 2020-10-14 | End: 2023-11-11

## 2020-10-14 RX ADMIN — SODIUM CHLORIDE: 9 INJECTION, SOLUTION INTRAVENOUS at 01:52

## 2020-10-14 RX ADMIN — DOCUSATE SODIUM 100 MG: 100 CAPSULE, LIQUID FILLED ORAL at 09:04

## 2020-10-14 RX ADMIN — MEXILETINE HYDROCHLORIDE 150 MG: 150 CAPSULE ORAL at 09:03

## 2020-10-14 RX ADMIN — DOCUSATE SODIUM AND SENNOSIDES 1 TABLET: 8.6; 5 TABLET ORAL at 09:03

## 2020-10-14 RX ADMIN — ENOXAPARIN SODIUM 40 MG: 40 INJECTION SUBCUTANEOUS at 09:03

## 2020-10-14 RX ADMIN — ACETAMINOPHEN 975 MG: 325 TABLET, FILM COATED ORAL at 09:13

## 2020-10-14 ASSESSMENT — ACTIVITIES OF DAILY LIVING (ADL)
ADLS_ACUITY_SCORE: 23
ADLS_ACUITY_SCORE: 19

## 2020-10-14 NOTE — PROGRESS NOTES
.Care Management Discharge Note    Discharge Planning:  Expected Discharge Date: 10/15/20     Concerns to be Addressed:  [facility], and [facility] able to accept pt, per MD pt ready for discharge today... need for pt/ to identify facility of preference.        SW met with pt,  also present in room. Discussed above, they have asked that planning continue for pt's transfer to [facility].     Additional questions were addressed, facility information handout was provided with facility contact number for  to call to determine how to utilize video chatting with pt during the rehab stay.      Anticipated Discharge Disposition:  [facility]   Anticipated Discharge Services:  [facility], w/c @ 1545, discussed with pt/ that Medicare does not cover the w/c transport  Anticipated Discharge DME:  NA    Patient/family educated on Medicare website which has current facility and service quality ratings: Yes  Referrals Placed by CM/SW:   SNF, transportation  Education Provided on the Discharge Plan:  Yes  Patient/Family in Agreement with the Plan:   Yes     .    BARRY Parisi   Inpatient Care Coordination   [facility]     [phone]

## 2020-10-14 NOTE — PHARMACY - DISCHARGE MEDICATION RECONCILIATION AND EDUCATION
David   Laminmarin     1945      female    [id number]                                [id number]    Allergy: Amoxicillin, Hydrochlorothiazide w/triamterene, and Sertraline    RX: Discharge Medication Consult by Pharmacist  EDUCATION:   Discharge Medication List   David Hogan   Home Medication Instructions FUNMI:[id number]    Printed on:10/14/20 8508   Medication Information                      acetaminophen (TYLENOL) 500 MG tablet  Take 500 mg by mouth 3 times daily              enoxaparin ANTICOAGULANT (LOVENOX) 40 MG/0.4ML syringe  Inject 0.4 mLs (40 mg) Subcutaneous every 24 hours             ibuprofen (ADVIL/MOTRIN) 200 MG capsule  Take 1 capsule (200 mg) by mouth every 8 hours as needed for fever             losartan (COZAAR) 25 MG tablet  Take 12.5 mg by mouth daily as needed (for BP > 160/90)             mexiletine (MEXITIL) 150 MG capsule  Take 150 mg by mouth 2 times daily             ondansetron (ZOFRAN-ODT) 4 MG ODT tab  Take 1 tablet (4 mg) by mouth every 6 hours as needed for nausea or vomiting             order for DME  Equipment being ordered: Walker Wheels () and Walker ()  Treatment Diagnosis: Impaired functional mobility             oxybutynin ER (DITROPAN-XL) 5 MG 24 hr tablet  Take 5 mg by mouth At Bedtime             oxyCODONE (ROXICODONE) 5 MG tablet  Take 1 tablet (5 mg) by mouth every 4 hours as needed for moderate to severe pain             polyethylene glycol (MIRALAX) 17 g packet  Take 17 g by mouth daily as needed for constipation             predniSONE (DELTASONE) 5 MG tablet  Take 25 mg by mouth twice a week on Saturday and Sunday             senna-docusate (SENOKOT-S/PERICOLACE) 8.6-50 MG tablet  Take 1 tablet by mouth 2 times daily as needed for constipation             Vitamin D, Cholecalciferol, 25 MCG (1000 UT) TABS  Take 2 tablets by mouth daily                 Patient was informed to STOP taking the following HOME medications:   none    Patient was informed to  start taking the following NEW medications:   Lovenox, Zofran, oxycodone, Miralax, senna-docusate    Patient was educated on the following for each discharge medication:  Rationale for therapy  Duration of treatment  Dosing and or monitoring drug levels  Common side effects  Importance of compliance  Drug/food interactions - recommended avoiding NSAIDs while on Lovenox, paged provider - ibuprofen changed to prn on AVS.  Missed doses  Self monitoring parameters - reviewed signs/symptoms of bleedings.    OUTCOMES:  Patient verbalized understanding  Left written materials and instructions (Clinical notes from [facility]) - none. Patient will transfer to [facility]

## 2020-10-14 NOTE — PROGRESS NOTES
Your information has been submitted on October 14th, 2020 at 12:19:53 PM CDT. The confirmation number is [id number]    Sridevi Garcia  Care Management Coordinator  [facility]  [phone]

## 2020-10-14 NOTE — PROGRESS NOTES
Orthopedic Surgery  David Hogan  10/14/2020  Admit Date:  10/10/2020  POD 3 Days Post-Op  S/P Procedure(s):  Open reduction internal fixation left intertrochanteric femur fracture with an intramedullary nail    Patient resting comfortably in bed.    Pain controlled.  Tolerating oral intake.    Denies nausea or vomiting  Denies chest pain or shortness of breath  No events overnight.     Alert and orient to person, place, and situation  Vital Sign Ranges  Temperature Temp  Av.4  F (36.9  C)  Min: 98.1  F (36.7  C)  Max: 98.7  F (37.1  C)   Blood pressure Systolic (24hrs), Av , Min:113 , Max:158        Diastolic (24hrs), Av, Min:55, Max:62      Pulse Pulse  Av.3  Min: 80  Max: 89   Respirations Resp  Av.8  Min: 16  Max: 18   Pulse oximetry SpO2  Av.3 %  Min: 94 %  Max: 96 %       Dressing is clean, dry, and intact.   Minimal erythema of the surrounding skin.   Bilateral calves are soft, non-tender.  Bilateral lower extremity is NVI.  Sensation intact bilateral lower extremities  5/5 motor with resisted dorsi and plantar flexion bilaterally  +Dp pulse    Labs:  Recent Labs   Lab Test 10/14/20  0615 10/12/20  0601 10/11/20  0703   POTASSIUM 3.7 4.0 4.3     Recent Labs   Lab Test 10/13/20  0645 10/12/20  0601 10/11/20  0703   HGB 9.0* 8.6* 10.4*     Recent Labs   Lab Test 10/10/20  1407   INR 0.95     Recent Labs   Lab Test 10/14/20  0615 10/11/20  0703 10/10/20  1155    298 360       A/P  1. S/p left intertrochanteric femur fracture ORIF   Continue Lovenox for DVT prophylaxis.     Mobilize with PT/OT    WBAt with walker.   Leave dressing intact     Continue current pain regiment.    2. Disposition   Anticipate d/c to TCU today     Karly Herbert PA-C

## 2020-10-15 NOTE — PLAN OF CARE
"Please refer to flow sheets for full assessment documentation. The following are highlights from my shift.     Admitting Dx: Mechanical fall; L femur fracture.  Hx: Osteoporosis, HTN, hyponatremia, prior tobacco use, myotonic dystrophy (weakness with ambulation).   Consults: Ortho  Diet:NPO since midnight  Appetite:Good  Diabetic: No    Pertinent Vitals: hypertensive  Neuro:AOx3, not time, forgetful and intermittently confused  Cardio: VSS, hypertensive in the evening.   Pulmonary: Clear, equal, no cough, IS encouraged  HEENT:Intact  Genitourinary:Purewick in place, DTV; straight cathed @ 0500 for only 200ml.   GI:Passing gas, no BM since admission  Skin: Bruising, no edema   Dressing:  Drains:  Lines:L PIV infusing NS@75/hr.   Devices: Purewick (removed @ 0500 for redness in labia)    Pain:\"Medium\" pain rating, IV Dilaudid 0.2mg given multiple times  Pertinent/Recent labs:   Need to know: Repo q2 hours, strict bedrest, concerned about  (needed reorientation to time of night and that her  was aware of her whereabouts).   Plan: Surgery 10/11 @ 1120 for ORIF of L hip/femur. GLORIA wipes still need to be completed.          "
OT: Orders received. Chart reviewed and discussed with care team.  PT is POD #1 ORIF of left femur. Per PT, plan is for TCU discharge. Will defer OT eval to next level of care to optimize participation in evaluation. Will complete OT orders.       
Pain controlled with tylenol. Up with 2 assist & magy steady to BSC. Some dizziness when up. Pt ate better this evening for dinner. Last BM Friday. Pt on bowel meds- declined supp. Fluids encouraged. Incontinent at times of urine- using purewick. IV fluids cont as ordered. Drsg intact. Baseling tingling BLE otherwise cms intact. Plan is TCU at discharge.  
Patient disorientated to date, time and situation but seems to be improving and not as confused.  Calm, cooperative and remembers she had surgery and is at the hospital. VSS RA.LS clear. Saline locked. Tolerated regular diet. +BS/faltus. Cms intact. Dressings c/d/I. DTV. Bladder scanned for 240 ML at 1420. Encouraging increased PO fluids. UP with assist of 2 gait belt, and magy steady to BSC. Pain managed with scheduled Tylenol only. Patient has declined Oxycodone.  at bedside. Plan is for TCU 1-2 days. Will continue to monitor.   
Physical Therapy Discharge Summary    Reason for therapy discharge:    Discharged to transitional care facility.    Progress towards therapy goal(s). See goals on Care Plan in [facility] electronic health record for goal details.  Goals not met.  Barriers to achieving goals:   discharge from facility.    Therapy recommendation(s):    Continued therapy is recommended.  Rationale/Recommendations:  PT as indicated at TCU.      Note: Pt not seen by documenting PT on this date. Information obtained from chart review.    
Pt A&O but can be confused at times. VSS. A2 with gait belt and magy steady. Up in chair for dinner. Voiding in small amounts frequently, purewick placed overnight. CMS intact. Dressings CDI. Ice applied to hip. Scheduled tylenol for pain. Plan is to discharge to TCU in 2-3 days.   
Pt A/O x 1, VSS, afebrile. Tolerating regular diet. Pt refused to stand up at bedside. Pt very combative and uncooperative. 2 dressings on L hip, mild drainage. Riddle in place overnight, voiding in adequate amounts. Riddle removed - DTV. Pt stated she had some pain but declined medication or ice interventions.  
Pt A/O x 1, confused but seems to remember situation and place once reoriented. VSS, afebrile. Tolerating regular diet. Pt A2 w/magy steady. Pt  combative and uncooperative at times.  2 dressings on L hip, mild drainage. Purewick in place overnight, voiding in adequate amounts. Pt stated she had some pain rated 5/10 but declined medication or ice interventions.  
Pt A/O x 2, confused but seems to remember situation and place once reoriented. VSS, afebrile. Tolerating regular diet, denies nausea. Pt A2 w/magy steady. Pt pleasant and cooperative overnight.  2 dressings on L hip, mild drainage. Purewick in place overnight, voiding in adequate amounts. Pt stated she had some pain rated 5/10 but declined medication or ice interventions.  
Pt back from pacu around 1530 on bed. VSS, BPs elevated at times. On RA, keeps taking pulse ox and O2 off. Agitated upon arrival but seems to be a little better later in evening. Disoriented/forgetful. IVF infusing. On IV cleocin. Regular diet. Riddle patent. Dressings CDI. CMS appears to be intact. Oxy and scheduled tylenol for pain. Refused to dangle at bedside multiple times but have been repositioning with A2. Most likely will need TCU at discharge.   
Pt is A&Ox2. Up with A2magy. Dressing CDI. Pain managed with tylenol. Voiding. Had a BM today. Regular diet. Discharging to [facility] at 1545 via .   
VS-HTN, 166/72, cozaar ordered if >160, med not available prior to pt went down to OR at 0920. Informed preop staff.  Dimitry is here with pt and went down to the preop area. He will go home when pt is in the OR. Pt is getting worked up for possible myotonic dystophy. Fell while getting out of chair.   Lung Sounds-clear, no cough, instructed pt on IS --upto 1500  O2-on room air,   GI-+bs,+flatus, lbm was Friday per pt. NPO.   -pt has no void via purwick. UA needed. --preop staff notified if pt needs cath that we need a UA.   IVF-at 75  Dressings-none.   CMS-denies numbness and tingling. +pp, scds on in bed. +wiggle toes. Pt has tremors bilaterally in arm.  Drain-none.   Activity-strict bedrest preoperatively.   Pain-rates pain a 7-8 iv dilaudid given x 1 before surgery.   D/C Plan-to be determined, tcu likely. Before surgery pt lived at home with     
VSS w/ ex HTN, resolved on own later in shift w/ different BP cuff. Alert and oriented x 4, forgetful at times w/ increase in forgetfullness at HS. Bedbound. PIV SL. Pt received PRN oxycodone, tylenol, torodal, and dilaudid for L hip pain which was somewhat effective. Purewick patent, PVR in 300s. Tolerating diet, good appetite. Surgical bed bath completed. PCDs in use. CMS intact, difficult moving LLE r/t pain. Planned surgery tomorrow.   
What Is The Reason For Today's Visit?: Skin Cancer Screening Exam
What Is The Reason For Today's Visit? (Being Monitored For X): the development of skin cancers

## 2020-10-18 NOTE — DISCHARGE SUMMARY
[facility]    Discharge Summary  Hospitalist    Date of Admission:  10/10/2020  Date of Discharge:  10/14/2020  4:05 PM  Discharging Provider: Sam Suazo MD  Date of Service (when I saw the patient): 10/14/20    Discharge Diagnoses   Left intertrochanteric hip fracture after ground-level fall.  Status post surgery.  Chronic orthostatic hypotension.  Hyponatremia.  Myotonic dystrophy.  Acute blood loss anemia.  Did not require any transfusion.  On chronic steroid therapy.    History of Present Illness   David Hogan is a 75 year old female with PMhx of osteoporosis, osteoarthritis, hypertension, hyponatremia, prior tobacco dependence, chronic back pain due to lumbar stenosis, who presents after a fall from standing height with left intertrochanteric femur fracture. The patient was admitted to inpatient status for further cares and surgical fixation.    Hospital Course     Left intertrochanteric hip fracture.  Patient was admitted to medicine service on behalf of orthopedic.  She underwent open reduction internal fixation with intramedullary nailing by the orthopedic team.  Management of the fracture and related issues were done by the orthopedic team including the DVT prophylaxis.  Patient is getting physical therapy.  Her activity remains rather poor.  She would benefit from further therapy at a TCU, per physical therapy recommendations.  So the patient is being discharged to a TCU.    Orthostatic hypotension.  Patient noted to have episodes of orthostatic hypotension in the hospital.  Per , this has happened at home as well.  It appears to be an ongoing chronic issue.  Patient was given volume expansion with normal saline which helped with her symptoms.  Currently she does not appear to have any more of near fainting episodes with standing up.    Underlying neuromuscular disorder.  Patient is following up with Dr. Cagle [facility] [place] [facility] for the working diagnosis of  myotonic dystrophy.  Currently she is on prednisone 25 mg twice a week as well as Mexitil for this diagnosis.  Follow-up with neurology as before.    Patient also noted to have chronic hyponatremia which appears to be a ongoing chronic issue.  After IV normal saline infusion her sodium has improved.  Continue outpatient monitoring.    Patient has a prescription of losartan to be taken on as-needed basis only for high blood pressure.  Does not take it on a regular basis.    Patient is being discharged to TCU for further rehab.  Home medications were continued as before.    I personally evaluated and examined the patient on the day of discharge.    Sam Suazo MD      Pending Results   These results will be followed up by PCP  Unresulted Labs Ordered in the Past 30 Days of this Admission     Date and Time Order Name Status Description    10/8/2020 1441 DM2 DNA TEST In process                Primary Care Physician   Jessica Thompson        Discharge Disposition   Discharged to TCU  Condition at discharge: Stable    Consultations This Hospital Stay   ORTHOPEDIC SURGERY IP CONSULT  SOCIAL WORK IP CONSULT  SOCIAL WORK IP CONSULT  PHYSICAL THERAPY ADULT IP CONSULT  OCCUPATIONAL THERAPY ADULT IP CONSULT  PHYSICAL THERAPY ADULT IP CONSULT  OCCUPATIONAL THERAPY ADULT IP CONSULT  PHARMACY DISCHARGE EDUCATION BY PHARMACIST    Time Spent on this Encounter   Discharge time: greater than 30 minutes.    Discharge Orders      General info for SNF    Length of Stay Estimate: Short Term Care: Estimated # of Days <30  Condition at Discharge: Improving  Level of care:skilled   Rehabilitation Potential: Good  Admission H&P remains valid and up-to-date: Yes  Recent Chemotherapy: N/A  Use Nursing Home Standing Orders: Yes     Mantoux instructions    Give two-step Mantoux (PPD) Per Facility Policy Yes     Reason for your hospital stay    Left intertrochanteric femur fracture open reduction and internal fixation     Weight bearing status     WBAT with walker     Activity - Up with assistive device     Follow Up and recommended labs and tests    Follow up with Dr. Feliciano in 2 weeks.  No follow up labs or test are needed.  Call for appointment or questions [phone] (Joyce Patient Coordinator for Dr. Feliciano)     Wound care    Site:   Left hip   Instructions:  Leave dressing intact until POD #5 (10/16/2020), leave open to air after this, ok to shower but do not soak or submerge     Physical Therapy Adult Consult    Evaluate and treat as clinically indicated.    Reason:  Left intertrochanteric femur fracture open reduction and internal fixation     Occupational Therapy Adult Consult    Evaluate and treat as clinically indicated.    Reason:  Left intertrochanteric femur fracture open reduction and internal fixation     Fall precautions     Advance Diet as Tolerated    Follow this diet upon discharge: Orders Placed This Encounter      Advance Diet as Tolerated: Regular Diet Adult     Discharge Medications   Discharge Medication List as of 10/14/2020  3:41 PM      START taking these medications    Details   ondansetron (ZOFRAN-ODT) 4 MG ODT tab Take 1 tablet (4 mg) by mouth every 6 hours as needed for nausea or vomiting, Disp-10 tablet, R-0, Transitional      oxyCODONE (ROXICODONE) 5 MG tablet Take 1 tablet (5 mg) by mouth every 4 hours as needed for moderate to severe pain, Disp-30 tablet, R-0, Local Print      polyethylene glycol (MIRALAX) 17 g packet Take 17 g by mouth daily as needed for constipation, Disp-24 packet, R-0, Transitional      senna-docusate (SENOKOT-S/PERICOLACE) 8.6-50 MG tablet Take 1 tablet by mouth 2 times daily as needed for constipation, Disp-40 tablet, R-0, Transitional         CONTINUE these medications which have CHANGED    Details   enoxaparin ANTICOAGULANT (LOVENOX) 40 MG/0.4ML syringe Inject 0.4 mLs (40 mg) Subcutaneous every 24 hours, Disp-5.6 mL, R-0, Transitional      ibuprofen (ADVIL/MOTRIN) 200 MG capsule Take 1 capsule  (200 mg) by mouth every 8 hours as needed for fever, Transitional         CONTINUE these medications which have NOT CHANGED    Details   acetaminophen (TYLENOL) 500 MG tablet Take 500 mg by mouth 3 times daily , Historical      losartan (COZAAR) 25 MG tablet Take 12.5 mg by mouth daily as needed (for BP > 160/90), Historical      mexiletine (MEXITIL) 150 MG capsule Take 150 mg by mouth 2 times daily, Historical      order for DME Equipment being ordered: Walker Wheels () and Walker ()  Treatment Diagnosis: Impaired functional mobilityDisp-1 each, R-0, Local Print      oxybutynin ER (DITROPAN-XL) 5 MG 24 hr tablet Take 5 mg by mouth At Bedtime, Historical      predniSONE (DELTASONE) 5 MG tablet Take 25 mg by mouth twice a week on Saturday and Sunday, Historical      Vitamin D, Cholecalciferol, 25 MCG (1000 UT) TABS Take 2 tablets by mouth daily, Historical           Allergies   Allergies   Allergen Reactions     Amoxicillin      Hydrochlorothiazide W/Triamterene Other (See Comments)     Hyponatremia     Sertraline Other (See Comments)     Diarrhea     Data   Most Recent 3 CBC's:  Recent Labs   Lab Test 10/14/20  0615 10/13/20  0645 10/12/20  0601 10/11/20  0703 10/10/20  1155 08/11/20  1326   WBC  --   --   --  13.0* 8.1 8.0   HGB  --  9.0* 8.6* 10.4* 11.6* 12.6   MCV  --   --   --  98 97 96     --   --  298 360 350      Most Recent 3 BMP's:  Recent Labs   Lab Test 10/14/20  0615 10/13/20  0645 10/12/20  0601 10/11/20  1652 10/11/20  0703     --  130*  --  132*   POTASSIUM 3.7  --  4.0  --  4.3   CHLORIDE 103  --  99  --  102   CO2 24  --  24  --  24   BUN 14  --  15  --  25   CR 0.55  --  0.57 0.64 0.72   ANIONGAP 7  --  7  --  6   ANN-MARIE 7.7*  --  8.1*  --  8.7   GLC 86 88 82  --  89     Most Recent 2 LFT's:  Recent Labs   Lab Test 08/11/20  1326   AST 18   ALT 23   BILITOTAL 0.3     Most Recent INR's and Anticoagulation Dosing History:  Anticoagulation Dose History     Recent Dosing and Labs  Latest Ref Rng & Units 10/10/2020    INR 0.86 - 1.14 0.95        Most Recent 3 Troponin's:  Recent Labs   Lab Test 07/17/19  0617   TROPI <0.015     Most Recent Cholesterol Panel:No lab results found.  Most Recent 6 Bacteria Isolates From Any Culture (See EPIC Reports for Culture Details):  Recent Labs   Lab Test 07/17/19  0700   CULT <10,000 colonies/mL  urogenital amado  Susceptibility testing not routinely done       Most Recent TSH, T4 and A1c Labs:  Recent Labs   Lab Test 07/17/19  0617   TSH 1.16

## 2020-12-04 LAB — LAB SCANNED RESULT: NORMAL

## 2020-12-14 ENCOUNTER — RECORDS - HEALTHEAST (OUTPATIENT)
Dept: LAB | Facility: CLINIC | Age: 75
End: 2020-12-14

## 2020-12-14 LAB
ALBUMIN UR-MCNC: ABNORMAL MG/DL
APPEARANCE UR: ABNORMAL
BACTERIA #/AREA URNS HPF: ABNORMAL HPF
BILIRUB UR QL STRIP: NEGATIVE
CAOX CRY #/AREA URNS HPF: PRESENT /[HPF]
COLOR UR AUTO: YELLOW
GLUCOSE UR STRIP-MCNC: NEGATIVE MG/DL
HGB UR QL STRIP: NEGATIVE
KETONES UR STRIP-MCNC: ABNORMAL MG/DL
LEUKOCYTE ESTERASE UR QL STRIP: ABNORMAL
MUCOUS THREADS #/AREA URNS LPF: ABNORMAL LPF
NITRATE UR QL: POSITIVE
PH UR STRIP: 6 [PH] (ref 4.5–8)
RBC #/AREA URNS AUTO: ABNORMAL HPF
SP GR UR STRIP: 1.02 (ref 1–1.03)
SQUAMOUS #/AREA URNS AUTO: ABNORMAL LPF
UROBILINOGEN UR STRIP-ACNC: ABNORMAL
WBC #/AREA URNS AUTO: ABNORMAL HPF

## 2020-12-17 LAB
BACTERIA SPEC CULT: ABNORMAL

## 2020-12-23 ENCOUNTER — RECORDS - HEALTHEAST (OUTPATIENT)
Dept: LAB | Facility: CLINIC | Age: 75
End: 2020-12-23

## 2020-12-23 LAB
ALBUMIN SERPL-MCNC: 3 G/DL (ref 3.5–5)
ALP SERPL-CCNC: 99 U/L (ref 45–120)
ALT SERPL W P-5'-P-CCNC: 58 U/L (ref 0–45)
ANION GAP SERPL CALCULATED.3IONS-SCNC: 8 MMOL/L (ref 5–18)
AST SERPL W P-5'-P-CCNC: 25 U/L (ref 0–40)
BILIRUB SERPL-MCNC: 0.3 MG/DL (ref 0–1)
BUN SERPL-MCNC: 19 MG/DL (ref 8–28)
CALCIUM SERPL-MCNC: 8.9 MG/DL (ref 8.5–10.5)
CHLORIDE BLD-SCNC: 105 MMOL/L (ref 98–107)
CO2 SERPL-SCNC: 24 MMOL/L (ref 22–31)
CREAT SERPL-MCNC: 0.62 MG/DL (ref 0.6–1.1)
ERYTHROCYTE [DISTWIDTH] IN BLOOD BY AUTOMATED COUNT: 15.4 % (ref 11–14.5)
GFR SERPL CREATININE-BSD FRML MDRD: >60 ML/MIN/1.73M2
GLUCOSE BLD-MCNC: 74 MG/DL (ref 70–125)
HCT VFR BLD AUTO: 30.9 % (ref 35–47)
HGB BLD-MCNC: 10.2 G/DL (ref 12–16)
MCH RBC QN AUTO: 31.1 PG (ref 27–34)
MCHC RBC AUTO-ENTMCNC: 33 G/DL (ref 32–36)
MCV RBC AUTO: 94 FL (ref 80–100)
PLATELET # BLD AUTO: 413 THOU/UL (ref 140–440)
PMV BLD AUTO: 9.3 FL (ref 8.5–12.5)
POTASSIUM BLD-SCNC: 4 MMOL/L (ref 3.5–5)
PROT SERPL-MCNC: 5.7 G/DL (ref 6–8)
RBC # BLD AUTO: 3.28 MILL/UL (ref 3.8–5.4)
SODIUM SERPL-SCNC: 137 MMOL/L (ref 136–145)
TSH SERPL DL<=0.005 MIU/L-ACNC: 0.6 UIU/ML (ref 0.3–5)
WBC: 7.3 THOU/UL (ref 4–11)

## 2020-12-24 LAB — 25(OH)D3 SERPL-MCNC: 42.3 NG/ML (ref 30–80)

## 2021-02-02 ENCOUNTER — RECORDS - HEALTHEAST (OUTPATIENT)
Dept: LAB | Facility: CLINIC | Age: 76
End: 2021-02-02

## 2021-02-02 LAB
ALBUMIN UR-MCNC: ABNORMAL MG/DL
APPEARANCE UR: ABNORMAL
BACTERIA #/AREA URNS HPF: ABNORMAL HPF
BILIRUB UR QL STRIP: NEGATIVE
COLOR UR AUTO: YELLOW
GLUCOSE UR STRIP-MCNC: NEGATIVE MG/DL
HGB UR QL STRIP: NEGATIVE
KETONES UR STRIP-MCNC: ABNORMAL MG/DL
LEUKOCYTE ESTERASE UR QL STRIP: ABNORMAL
MUCOUS THREADS #/AREA URNS LPF: ABNORMAL LPF
NITRATE UR QL: NEGATIVE
PH UR STRIP: 6.5 [PH] (ref 4.5–8)
RBC #/AREA URNS AUTO: ABNORMAL HPF
SP GR UR STRIP: 1.02 (ref 1–1.03)
SQUAMOUS #/AREA URNS AUTO: ABNORMAL LPF
UROBILINOGEN UR STRIP-ACNC: ABNORMAL
WBC #/AREA URNS AUTO: ABNORMAL HPF
WBC CLUMPS #/AREA URNS HPF: PRESENT /[HPF]

## 2021-02-03 LAB — BACTERIA SPEC CULT: NORMAL

## 2021-03-02 ENCOUNTER — RECORDS - HEALTHEAST (OUTPATIENT)
Dept: LAB | Facility: CLINIC | Age: 76
End: 2021-03-02

## 2021-03-02 LAB
ALBUMIN UR-MCNC: NEGATIVE MG/DL
AMORPH CRY #/AREA URNS HPF: ABNORMAL /[HPF]
APPEARANCE UR: ABNORMAL
BACTERIA #/AREA URNS HPF: ABNORMAL HPF
BILIRUB UR QL STRIP: NEGATIVE
CAOX CRY #/AREA URNS HPF: PRESENT /[HPF]
COLOR UR AUTO: YELLOW
GLUCOSE UR STRIP-MCNC: NEGATIVE MG/DL
HGB UR QL STRIP: NEGATIVE
KETONES UR STRIP-MCNC: NEGATIVE MG/DL
LEUKOCYTE ESTERASE UR QL STRIP: ABNORMAL
NITRATE UR QL: NEGATIVE
PH UR STRIP: 6.5 [PH] (ref 4.5–8)
RBC #/AREA URNS AUTO: ABNORMAL HPF
SP GR UR STRIP: 1.02 (ref 1–1.03)
SQUAMOUS #/AREA URNS AUTO: ABNORMAL LPF
UROBILINOGEN UR STRIP-ACNC: ABNORMAL
WBC #/AREA URNS AUTO: ABNORMAL HPF

## 2021-03-03 LAB — BACTERIA SPEC CULT: NORMAL

## 2021-04-02 ENCOUNTER — RECORDS - HEALTHEAST (OUTPATIENT)
Dept: LAB | Facility: CLINIC | Age: 76
End: 2021-04-02

## 2021-04-03 LAB — BACTERIA SPEC CULT: NORMAL

## 2021-05-02 ENCOUNTER — RECORDS - HEALTHEAST (OUTPATIENT)
Dept: LAB | Facility: CLINIC | Age: 76
End: 2021-05-02

## 2021-05-03 LAB — BACTERIA SPEC CULT: NORMAL

## 2021-06-16 ENCOUNTER — RECORDS - HEALTHEAST (OUTPATIENT)
Dept: LAB | Facility: CLINIC | Age: 76
End: 2021-06-16

## 2021-06-17 LAB
ANION GAP SERPL CALCULATED.3IONS-SCNC: 10 MMOL/L (ref 5–18)
BUN SERPL-MCNC: 11 MG/DL (ref 8–28)
CALCIUM SERPL-MCNC: 9 MG/DL (ref 8.5–10.5)
CHLORIDE BLD-SCNC: 101 MMOL/L (ref 98–107)
CO2 SERPL-SCNC: 26 MMOL/L (ref 22–31)
CREAT SERPL-MCNC: 0.63 MG/DL (ref 0.6–1.1)
ERYTHROCYTE [DISTWIDTH] IN BLOOD BY AUTOMATED COUNT: 14.5 % (ref 11–14.5)
GFR SERPL CREATININE-BSD FRML MDRD: >60 ML/MIN/1.73M2
GLUCOSE BLD-MCNC: 107 MG/DL (ref 70–125)
HCT VFR BLD AUTO: 33.1 % (ref 35–47)
HGB BLD-MCNC: 11.2 G/DL (ref 12–16)
MCH RBC QN AUTO: 32.4 PG (ref 27–34)
MCHC RBC AUTO-ENTMCNC: 33.8 G/DL (ref 32–36)
MCV RBC AUTO: 96 FL (ref 80–100)
PLATELET # BLD AUTO: 309 THOU/UL (ref 140–440)
PMV BLD AUTO: 9.9 FL (ref 8.5–12.5)
POTASSIUM BLD-SCNC: 3.7 MMOL/L (ref 3.5–5)
RBC # BLD AUTO: 3.46 MILL/UL (ref 3.8–5.4)
SODIUM SERPL-SCNC: 137 MMOL/L (ref 136–145)
WBC: 12.9 THOU/UL (ref 4–11)

## 2021-08-24 ENCOUNTER — LAB REQUISITION (OUTPATIENT)
Dept: LAB | Facility: CLINIC | Age: 76
End: 2021-08-24
Payer: MEDICARE

## 2021-08-24 DIAGNOSIS — R10.9 UNSPECIFIED ABDOMINAL PAIN: ICD-10-CM

## 2021-08-24 LAB
ALBUMIN UR-MCNC: 30 MG/DL
APPEARANCE UR: ABNORMAL
BACTERIA #/AREA URNS HPF: ABNORMAL /HPF
BILIRUB UR QL STRIP: NEGATIVE
CAOX CRY #/AREA URNS HPF: ABNORMAL /HPF
COLOR UR AUTO: YELLOW
GLUCOSE UR STRIP-MCNC: NEGATIVE MG/DL
HGB UR QL STRIP: NEGATIVE
KETONES UR STRIP-MCNC: ABNORMAL MG/DL
LEUKOCYTE ESTERASE UR QL STRIP: ABNORMAL
MUCOUS THREADS #/AREA URNS LPF: PRESENT /LPF
NITRATE UR QL: POSITIVE
PH UR STRIP: 5.5 [PH] (ref 5–7)
RBC URINE: 11 /HPF
SP GR UR STRIP: 1.03 (ref 1–1.03)
SQUAMOUS EPITHELIAL: 6 /HPF
TRANSITIONAL EPI: <1 /HPF
UROBILINOGEN UR STRIP-MCNC: <2 MG/DL
WBC CLUMPS #/AREA URNS HPF: PRESENT /HPF
WBC URINE: >182 /HPF

## 2021-08-24 PROCEDURE — 81001 URINALYSIS AUTO W/SCOPE: CPT | Mod: ORL | Performed by: FAMILY MEDICINE

## 2021-08-24 PROCEDURE — 87086 URINE CULTURE/COLONY COUNT: CPT | Mod: ORL | Performed by: FAMILY MEDICINE

## 2021-08-26 LAB — BACTERIA UR CULT: ABNORMAL

## 2021-12-27 ENCOUNTER — LAB REQUISITION (OUTPATIENT)
Dept: LAB | Facility: CLINIC | Age: 76
End: 2021-12-27
Payer: MEDICARE

## 2021-12-27 DIAGNOSIS — I10 ESSENTIAL (PRIMARY) HYPERTENSION: ICD-10-CM

## 2021-12-27 DIAGNOSIS — E55.9 VITAMIN D DEFICIENCY, UNSPECIFIED: ICD-10-CM

## 2021-12-29 LAB
ALBUMIN SERPL-MCNC: 3.4 G/DL (ref 3.5–5)
ALP SERPL-CCNC: 99 U/L (ref 45–120)
ALT SERPL W P-5'-P-CCNC: <9 U/L (ref 0–45)
ANION GAP SERPL CALCULATED.3IONS-SCNC: 9 MMOL/L (ref 5–18)
AST SERPL W P-5'-P-CCNC: 15 U/L (ref 0–40)
BASOPHILS # BLD AUTO: 0.1 10E3/UL (ref 0–0.2)
BASOPHILS NFR BLD AUTO: 1 %
BILIRUB SERPL-MCNC: 0.2 MG/DL (ref 0–1)
BUN SERPL-MCNC: 18 MG/DL (ref 8–28)
CALCIUM SERPL-MCNC: 9.8 MG/DL (ref 8.5–10.5)
CHLORIDE BLD-SCNC: 99 MMOL/L (ref 98–107)
CO2 SERPL-SCNC: 28 MMOL/L (ref 22–31)
CREAT SERPL-MCNC: 0.66 MG/DL (ref 0.6–1.1)
EOSINOPHIL # BLD AUTO: 0.2 10E3/UL (ref 0–0.7)
EOSINOPHIL NFR BLD AUTO: 3 %
ERYTHROCYTE [DISTWIDTH] IN BLOOD BY AUTOMATED COUNT: 14 % (ref 10–15)
GFR SERPL CREATININE-BSD FRML MDRD: 90 ML/MIN/1.73M2
GLUCOSE BLD-MCNC: 74 MG/DL (ref 70–125)
HCT VFR BLD AUTO: 31.6 % (ref 35–47)
HGB BLD-MCNC: 11.8 G/DL (ref 11.7–15.7)
IMM GRANULOCYTES # BLD: 0.1 10E3/UL
IMM GRANULOCYTES NFR BLD: 1 %
LYMPHOCYTES # BLD AUTO: 2.5 10E3/UL (ref 0.8–5.3)
LYMPHOCYTES NFR BLD AUTO: 32 %
MCH RBC QN AUTO: 36.5 PG (ref 26.5–33)
MCHC RBC AUTO-ENTMCNC: 37.3 G/DL (ref 31.5–36.5)
MCV RBC AUTO: 98 FL (ref 78–100)
MONOCYTES # BLD AUTO: 1 10E3/UL (ref 0–1.3)
MONOCYTES NFR BLD AUTO: 14 %
NEUTROPHILS # BLD AUTO: 3.7 10E3/UL (ref 1.6–8.3)
NEUTROPHILS NFR BLD AUTO: 49 %
NRBC # BLD AUTO: 0 10E3/UL
NRBC BLD AUTO-RTO: 0 /100
PLATELET # BLD AUTO: 357 10E3/UL (ref 150–450)
POTASSIUM BLD-SCNC: 4.2 MMOL/L (ref 3.5–5)
PROT SERPL-MCNC: 6.7 G/DL (ref 6–8)
RBC # BLD AUTO: 3.23 10E6/UL (ref 3.8–5.2)
SODIUM SERPL-SCNC: 136 MMOL/L (ref 136–145)
WBC # BLD AUTO: 7.6 10E3/UL (ref 4–11)

## 2021-12-29 PROCEDURE — 80053 COMPREHEN METABOLIC PANEL: CPT | Mod: ORL | Performed by: FAMILY MEDICINE

## 2021-12-29 PROCEDURE — P9604 ONE-WAY ALLOW PRORATED TRIP: HCPCS | Mod: ORL | Performed by: FAMILY MEDICINE

## 2021-12-29 PROCEDURE — 82306 VITAMIN D 25 HYDROXY: CPT | Mod: ORL | Performed by: FAMILY MEDICINE

## 2021-12-29 PROCEDURE — 36415 COLL VENOUS BLD VENIPUNCTURE: CPT | Mod: ORL | Performed by: FAMILY MEDICINE

## 2021-12-29 PROCEDURE — 85025 COMPLETE CBC W/AUTO DIFF WBC: CPT | Mod: ORL | Performed by: FAMILY MEDICINE

## 2021-12-30 LAB — DEPRECATED CALCIDIOL+CALCIFEROL SERPL-MC: 43 UG/L (ref 30–80)

## 2022-02-23 ENCOUNTER — LAB REQUISITION (OUTPATIENT)
Dept: LAB | Facility: CLINIC | Age: 77
End: 2022-02-23
Payer: MEDICARE

## 2022-02-23 DIAGNOSIS — N32.81 OVERACTIVE BLADDER: ICD-10-CM

## 2022-02-23 LAB
ALBUMIN UR-MCNC: NEGATIVE MG/DL
APPEARANCE UR: CLEAR
BACTERIA #/AREA URNS HPF: ABNORMAL /HPF
BILIRUB UR QL STRIP: NEGATIVE
COLOR UR AUTO: ABNORMAL
GLUCOSE UR STRIP-MCNC: NEGATIVE MG/DL
HGB UR QL STRIP: NEGATIVE
KETONES UR STRIP-MCNC: NEGATIVE MG/DL
LEUKOCYTE ESTERASE UR QL STRIP: ABNORMAL
MUCOUS THREADS #/AREA URNS LPF: PRESENT /LPF
NITRATE UR QL: NEGATIVE
PH UR STRIP: 7 [PH] (ref 5–7)
RBC URINE: 2 /HPF
SP GR UR STRIP: 1.02 (ref 1–1.03)
SQUAMOUS EPITHELIAL: 1 /HPF
UROBILINOGEN UR STRIP-MCNC: <2 MG/DL
WBC URINE: 8 /HPF

## 2022-02-23 PROCEDURE — 81001 URINALYSIS AUTO W/SCOPE: CPT | Mod: ORL | Performed by: NURSE PRACTITIONER

## 2022-06-07 ENCOUNTER — LAB REQUISITION (OUTPATIENT)
Dept: LAB | Facility: CLINIC | Age: 77
End: 2022-06-07
Payer: MEDICARE

## 2022-06-07 DIAGNOSIS — E55.9 VITAMIN D DEFICIENCY, UNSPECIFIED: ICD-10-CM

## 2022-06-07 DIAGNOSIS — I10 ESSENTIAL (PRIMARY) HYPERTENSION: ICD-10-CM

## 2022-06-08 LAB
ALBUMIN SERPL-MCNC: 3.1 G/DL (ref 3.5–5)
ALP SERPL-CCNC: 95 U/L (ref 45–120)
ALT SERPL W P-5'-P-CCNC: <9 U/L (ref 0–45)
ANION GAP SERPL CALCULATED.3IONS-SCNC: 10 MMOL/L (ref 5–18)
AST SERPL W P-5'-P-CCNC: 12 U/L (ref 0–40)
BASOPHILS # BLD AUTO: 0.1 10E3/UL (ref 0–0.2)
BASOPHILS NFR BLD AUTO: 1 %
BILIRUB SERPL-MCNC: 0.3 MG/DL (ref 0–1)
BUN SERPL-MCNC: 12 MG/DL (ref 8–28)
CALCIUM SERPL-MCNC: 9.5 MG/DL (ref 8.5–10.5)
CHLORIDE BLD-SCNC: 99 MMOL/L (ref 98–107)
CO2 SERPL-SCNC: 27 MMOL/L (ref 22–31)
CREAT SERPL-MCNC: 0.66 MG/DL (ref 0.6–1.1)
DEPRECATED CALCIDIOL+CALCIFEROL SERPL-MC: 55 UG/L (ref 20–75)
EOSINOPHIL # BLD AUTO: 0.1 10E3/UL (ref 0–0.7)
EOSINOPHIL NFR BLD AUTO: 2 %
ERYTHROCYTE [DISTWIDTH] IN BLOOD BY AUTOMATED COUNT: 13.7 % (ref 10–15)
GFR SERPL CREATININE-BSD FRML MDRD: 89 ML/MIN/1.73M2
GLUCOSE BLD-MCNC: 95 MG/DL (ref 70–125)
HCT VFR BLD AUTO: 36.2 % (ref 35–47)
HGB BLD-MCNC: 11.5 G/DL (ref 11.7–15.7)
IMM GRANULOCYTES # BLD: 0 10E3/UL
IMM GRANULOCYTES NFR BLD: 1 %
LYMPHOCYTES # BLD AUTO: 1.9 10E3/UL (ref 0.8–5.3)
LYMPHOCYTES NFR BLD AUTO: 30 %
MCH RBC QN AUTO: 29.3 PG (ref 26.5–33)
MCHC RBC AUTO-ENTMCNC: 31.8 G/DL (ref 31.5–36.5)
MCV RBC AUTO: 92 FL (ref 78–100)
MONOCYTES # BLD AUTO: 0.9 10E3/UL (ref 0–1.3)
MONOCYTES NFR BLD AUTO: 15 %
NEUTROPHILS # BLD AUTO: 3.2 10E3/UL (ref 1.6–8.3)
NEUTROPHILS NFR BLD AUTO: 51 %
NRBC # BLD AUTO: 0 10E3/UL
NRBC BLD AUTO-RTO: 0 /100
PLATELET # BLD AUTO: 365 10E3/UL (ref 150–450)
POTASSIUM BLD-SCNC: 4.1 MMOL/L (ref 3.5–5)
PROT SERPL-MCNC: 7.3 G/DL (ref 6–8)
RBC # BLD AUTO: 3.93 10E6/UL (ref 3.8–5.2)
SODIUM SERPL-SCNC: 136 MMOL/L (ref 136–145)
WBC # BLD AUTO: 6.2 10E3/UL (ref 4–11)

## 2022-06-08 PROCEDURE — 80053 COMPREHEN METABOLIC PANEL: CPT | Mod: ORL | Performed by: NURSE PRACTITIONER

## 2022-06-08 PROCEDURE — 85025 COMPLETE CBC W/AUTO DIFF WBC: CPT | Mod: ORL | Performed by: NURSE PRACTITIONER

## 2022-06-08 PROCEDURE — P9603 ONE-WAY ALLOW PRORATED MILES: HCPCS | Mod: ORL | Performed by: NURSE PRACTITIONER

## 2022-06-08 PROCEDURE — 36415 COLL VENOUS BLD VENIPUNCTURE: CPT | Mod: ORL | Performed by: NURSE PRACTITIONER

## 2022-06-08 PROCEDURE — 82306 VITAMIN D 25 HYDROXY: CPT | Mod: ORL | Performed by: NURSE PRACTITIONER

## 2022-06-14 ENCOUNTER — LAB REQUISITION (OUTPATIENT)
Dept: LAB | Facility: CLINIC | Age: 77
End: 2022-06-14
Payer: MEDICARE

## 2022-06-15 LAB
ALBUMIN SERPL-MCNC: 3.4 G/DL (ref 3.5–5)
ALP SERPL-CCNC: 94 U/L (ref 45–120)
ALT SERPL W P-5'-P-CCNC: <9 U/L (ref 0–45)
ANION GAP SERPL CALCULATED.3IONS-SCNC: 13 MMOL/L (ref 5–18)
AST SERPL W P-5'-P-CCNC: 15 U/L (ref 0–40)
BASOPHILS # BLD AUTO: 0.1 10E3/UL (ref 0–0.2)
BASOPHILS NFR BLD AUTO: 1 %
BILIRUB SERPL-MCNC: 0.3 MG/DL (ref 0–1)
BUN SERPL-MCNC: 18 MG/DL (ref 8–28)
CALCIUM SERPL-MCNC: 9.4 MG/DL (ref 8.5–10.5)
CHLORIDE BLD-SCNC: 99 MMOL/L (ref 98–107)
CO2 SERPL-SCNC: 22 MMOL/L (ref 22–31)
CREAT SERPL-MCNC: 0.71 MG/DL (ref 0.6–1.1)
EOSINOPHIL # BLD AUTO: 0.1 10E3/UL (ref 0–0.7)
EOSINOPHIL NFR BLD AUTO: 2 %
ERYTHROCYTE [DISTWIDTH] IN BLOOD BY AUTOMATED COUNT: 13.5 % (ref 10–15)
GFR SERPL CREATININE-BSD FRML MDRD: 87 ML/MIN/1.73M2
GLUCOSE BLD-MCNC: 81 MG/DL (ref 70–125)
HCT VFR BLD AUTO: 32.1 % (ref 35–47)
HGB BLD-MCNC: 11.1 G/DL (ref 11.7–15.7)
IMM GRANULOCYTES # BLD: 0.1 10E3/UL
IMM GRANULOCYTES NFR BLD: 1 %
LYMPHOCYTES # BLD AUTO: 2.3 10E3/UL (ref 0.8–5.3)
LYMPHOCYTES NFR BLD AUTO: 25 %
MCH RBC QN AUTO: 32.5 PG (ref 26.5–33)
MCHC RBC AUTO-ENTMCNC: 34.6 G/DL (ref 31.5–36.5)
MCV RBC AUTO: 94 FL (ref 78–100)
MONOCYTES # BLD AUTO: 0.9 10E3/UL (ref 0–1.3)
MONOCYTES NFR BLD AUTO: 10 %
NEUTROPHILS # BLD AUTO: 5.6 10E3/UL (ref 1.6–8.3)
NEUTROPHILS NFR BLD AUTO: 61 %
NRBC # BLD AUTO: 0 10E3/UL
NRBC BLD AUTO-RTO: 0 /100
PLATELET # BLD AUTO: 453 10E3/UL (ref 150–450)
POTASSIUM BLD-SCNC: 4 MMOL/L (ref 3.5–5)
PROT SERPL-MCNC: 7.2 G/DL (ref 6–8)
RBC # BLD AUTO: 3.42 10E6/UL (ref 3.8–5.2)
SODIUM SERPL-SCNC: 134 MMOL/L (ref 136–145)
WBC # BLD AUTO: 8.9 10E3/UL (ref 4–11)

## 2022-06-15 PROCEDURE — P9603 ONE-WAY ALLOW PRORATED MILES: HCPCS | Mod: ORL | Performed by: NURSE PRACTITIONER

## 2022-06-15 PROCEDURE — 36415 COLL VENOUS BLD VENIPUNCTURE: CPT | Mod: ORL | Performed by: NURSE PRACTITIONER

## 2022-06-15 PROCEDURE — 80053 COMPREHEN METABOLIC PANEL: CPT | Mod: ORL | Performed by: NURSE PRACTITIONER

## 2022-06-15 PROCEDURE — 85025 COMPLETE CBC W/AUTO DIFF WBC: CPT | Mod: ORL | Performed by: NURSE PRACTITIONER

## 2022-06-15 PROCEDURE — 82306 VITAMIN D 25 HYDROXY: CPT | Mod: ORL | Performed by: NURSE PRACTITIONER

## 2022-06-16 LAB — DEPRECATED CALCIDIOL+CALCIFEROL SERPL-MC: 55 UG/L (ref 20–75)

## 2022-09-03 ENCOUNTER — LAB REQUISITION (OUTPATIENT)
Dept: LAB | Facility: CLINIC | Age: 77
End: 2022-09-03
Payer: MEDICARE

## 2022-09-03 DIAGNOSIS — R32 UNSPECIFIED URINARY INCONTINENCE: ICD-10-CM

## 2022-09-03 LAB
ALBUMIN UR-MCNC: NEGATIVE MG/DL
APPEARANCE UR: CLEAR
BILIRUB UR QL STRIP: NEGATIVE
COLOR UR AUTO: YELLOW
GLUCOSE UR STRIP-MCNC: 50 MG/DL
HGB UR QL STRIP: NEGATIVE
KETONES UR STRIP-MCNC: ABNORMAL MG/DL
LEUKOCYTE ESTERASE UR QL STRIP: NEGATIVE
MUCOUS THREADS #/AREA URNS LPF: PRESENT /LPF
NITRATE UR QL: NEGATIVE
PH UR STRIP: 6 [PH] (ref 5–7)
RBC URINE: 5 /HPF
SP GR UR STRIP: 1.03 (ref 1–1.03)
SQUAMOUS EPITHELIAL: 1 /HPF
TRANSITIONAL EPI: <1 /HPF
UROBILINOGEN UR STRIP-MCNC: NORMAL MG/DL
WBC URINE: 2 /HPF

## 2022-09-03 PROCEDURE — 81001 URINALYSIS AUTO W/SCOPE: CPT | Mod: ORL | Performed by: FAMILY MEDICINE

## 2022-12-19 ENCOUNTER — LAB REQUISITION (OUTPATIENT)
Dept: LAB | Facility: CLINIC | Age: 77
End: 2022-12-19
Payer: MEDICARE

## 2022-12-19 DIAGNOSIS — R68.89 OTHER GENERAL SYMPTOMS AND SIGNS: ICD-10-CM

## 2022-12-19 DIAGNOSIS — I10 ESSENTIAL (PRIMARY) HYPERTENSION: ICD-10-CM

## 2022-12-21 LAB
ANION GAP SERPL CALCULATED.3IONS-SCNC: 20 MMOL/L (ref 7–15)
BUN SERPL-MCNC: 13.3 MG/DL (ref 8–23)
CALCIUM SERPL-MCNC: 10 MG/DL (ref 8.8–10.2)
CHLORIDE SERPL-SCNC: 98 MMOL/L (ref 98–107)
CREAT SERPL-MCNC: 0.57 MG/DL (ref 0.51–0.95)
DEPRECATED HCO3 PLAS-SCNC: 19 MMOL/L (ref 22–29)
ERYTHROCYTE [DISTWIDTH] IN BLOOD BY AUTOMATED COUNT: 13.8 % (ref 10–15)
GFR SERPL CREATININE-BSD FRML MDRD: >90 ML/MIN/1.73M2
GLUCOSE SERPL-MCNC: 90 MG/DL (ref 70–99)
HBA1C MFR BLD: 5.8 %
HCT VFR BLD AUTO: 37.6 % (ref 35–47)
HGB BLD-MCNC: 11.7 G/DL (ref 11.7–15.7)
MCH RBC QN AUTO: 29.5 PG (ref 26.5–33)
MCHC RBC AUTO-ENTMCNC: 31.1 G/DL (ref 31.5–36.5)
MCV RBC AUTO: 95 FL (ref 78–100)
PLATELET # BLD AUTO: 423 10E3/UL (ref 150–450)
POTASSIUM SERPL-SCNC: 4.5 MMOL/L (ref 3.4–5.3)
RBC # BLD AUTO: 3.97 10E6/UL (ref 3.8–5.2)
SODIUM SERPL-SCNC: 137 MMOL/L (ref 136–145)
WBC # BLD AUTO: 9.5 10E3/UL (ref 4–11)

## 2022-12-21 PROCEDURE — 85027 COMPLETE CBC AUTOMATED: CPT | Mod: ORL | Performed by: FAMILY MEDICINE

## 2022-12-21 PROCEDURE — P9604 ONE-WAY ALLOW PRORATED TRIP: HCPCS | Mod: ORL | Performed by: FAMILY MEDICINE

## 2022-12-21 PROCEDURE — 83036 HEMOGLOBIN GLYCOSYLATED A1C: CPT | Mod: ORL | Performed by: FAMILY MEDICINE

## 2022-12-21 PROCEDURE — 80048 BASIC METABOLIC PNL TOTAL CA: CPT | Mod: ORL | Performed by: FAMILY MEDICINE

## 2022-12-21 PROCEDURE — 36415 COLL VENOUS BLD VENIPUNCTURE: CPT | Mod: ORL | Performed by: FAMILY MEDICINE

## 2023-04-24 ENCOUNTER — LAB REQUISITION (OUTPATIENT)
Dept: LAB | Facility: CLINIC | Age: 78
End: 2023-04-24
Payer: MEDICARE

## 2023-04-24 DIAGNOSIS — N32.89 OTHER SPECIFIED DISORDERS OF BLADDER: ICD-10-CM

## 2023-04-24 PROCEDURE — 87086 URINE CULTURE/COLONY COUNT: CPT | Mod: ORL | Performed by: NURSE PRACTITIONER

## 2023-04-26 LAB — BACTERIA UR CULT: NORMAL

## 2023-07-11 ENCOUNTER — LAB REQUISITION (OUTPATIENT)
Dept: LAB | Facility: CLINIC | Age: 78
End: 2023-07-11
Payer: MEDICARE

## 2023-07-11 DIAGNOSIS — I10 ESSENTIAL (PRIMARY) HYPERTENSION: ICD-10-CM

## 2023-07-11 DIAGNOSIS — E55.9 VITAMIN D DEFICIENCY, UNSPECIFIED: ICD-10-CM

## 2023-07-12 LAB
ANION GAP SERPL CALCULATED.3IONS-SCNC: 13 MMOL/L (ref 7–15)
BUN SERPL-MCNC: 16.1 MG/DL (ref 8–23)
CALCIUM SERPL-MCNC: 9.6 MG/DL (ref 8.8–10.2)
CHLORIDE SERPL-SCNC: 100 MMOL/L (ref 98–107)
CREAT SERPL-MCNC: 0.52 MG/DL (ref 0.51–0.95)
DEPRECATED HCO3 PLAS-SCNC: 24 MMOL/L (ref 22–29)
ERYTHROCYTE [DISTWIDTH] IN BLOOD BY AUTOMATED COUNT: 14.2 % (ref 10–15)
GFR SERPL CREATININE-BSD FRML MDRD: >90 ML/MIN/1.73M2
GLUCOSE SERPL-MCNC: 86 MG/DL (ref 70–99)
HCT VFR BLD AUTO: 38.4 % (ref 35–47)
HGB BLD-MCNC: 11.8 G/DL (ref 11.7–15.7)
MCH RBC QN AUTO: 29.2 PG (ref 26.5–33)
MCHC RBC AUTO-ENTMCNC: 30.7 G/DL (ref 31.5–36.5)
MCV RBC AUTO: 95 FL (ref 78–100)
PLATELET # BLD AUTO: 399 10E3/UL (ref 150–450)
POTASSIUM SERPL-SCNC: 4.3 MMOL/L (ref 3.4–5.3)
RBC # BLD AUTO: 4.04 10E6/UL (ref 3.8–5.2)
SODIUM SERPL-SCNC: 137 MMOL/L (ref 136–145)
TSH SERPL DL<=0.005 MIU/L-ACNC: 0.76 UIU/ML (ref 0.3–4.2)
WBC # BLD AUTO: 7.3 10E3/UL (ref 4–11)

## 2023-07-12 PROCEDURE — 84443 ASSAY THYROID STIM HORMONE: CPT | Mod: ORL | Performed by: NURSE PRACTITIONER

## 2023-07-12 PROCEDURE — 85027 COMPLETE CBC AUTOMATED: CPT | Mod: ORL | Performed by: NURSE PRACTITIONER

## 2023-07-12 PROCEDURE — P9604 ONE-WAY ALLOW PRORATED TRIP: HCPCS | Mod: ORL | Performed by: NURSE PRACTITIONER

## 2023-07-12 PROCEDURE — 80048 BASIC METABOLIC PNL TOTAL CA: CPT | Mod: ORL | Performed by: NURSE PRACTITIONER

## 2023-07-12 PROCEDURE — 36415 COLL VENOUS BLD VENIPUNCTURE: CPT | Mod: ORL | Performed by: NURSE PRACTITIONER

## 2023-07-12 PROCEDURE — 82306 VITAMIN D 25 HYDROXY: CPT | Mod: ORL | Performed by: NURSE PRACTITIONER

## 2023-07-13 LAB — DEPRECATED CALCIDIOL+CALCIFEROL SERPL-MC: 64 UG/L (ref 20–75)

## 2023-10-03 ENCOUNTER — LAB REQUISITION (OUTPATIENT)
Dept: LAB | Facility: CLINIC | Age: 78
End: 2023-10-03
Payer: MEDICARE

## 2023-10-03 DIAGNOSIS — R41.82 ALTERED MENTAL STATUS, UNSPECIFIED: ICD-10-CM

## 2023-10-03 LAB
ALBUMIN UR-MCNC: 10 MG/DL
APPEARANCE UR: ABNORMAL
BILIRUB UR QL STRIP: NEGATIVE
COLOR UR AUTO: YELLOW
GLUCOSE UR STRIP-MCNC: NEGATIVE MG/DL
HGB UR QL STRIP: NEGATIVE
HYALINE CASTS: 1 /LPF
KETONES UR STRIP-MCNC: NEGATIVE MG/DL
LEUKOCYTE ESTERASE UR QL STRIP: NEGATIVE
MUCOUS THREADS #/AREA URNS LPF: PRESENT /LPF
NITRATE UR QL: NEGATIVE
PH UR STRIP: 6.5 [PH] (ref 5–7)
RBC URINE: 0 /HPF
SP GR UR STRIP: 1.02 (ref 1–1.03)
SQUAMOUS EPITHELIAL: 5 /HPF
UROBILINOGEN UR STRIP-MCNC: NORMAL MG/DL
WBC URINE: 2 /HPF

## 2023-10-03 PROCEDURE — 81001 URINALYSIS AUTO W/SCOPE: CPT | Mod: ORL | Performed by: NURSE PRACTITIONER

## 2023-11-10 ENCOUNTER — HOSPITAL ENCOUNTER (INPATIENT)
Facility: CLINIC | Age: 78
LOS: 5 days | Discharge: NURSING, REHAB OR LONG-TERM CARE | DRG: 481 | End: 2023-11-16
Attending: EMERGENCY MEDICINE | Admitting: INTERNAL MEDICINE
Payer: MEDICARE

## 2023-11-10 DIAGNOSIS — G31.83 MODERATE LEWY BODY DEMENTIA WITH ANXIETY (H): ICD-10-CM

## 2023-11-10 DIAGNOSIS — F02.B4 MODERATE LEWY BODY DEMENTIA WITH ANXIETY (H): ICD-10-CM

## 2023-11-10 DIAGNOSIS — S72.141A CLOSED DISPLACED INTERTROCHANTERIC FRACTURE OF RIGHT FEMUR, INITIAL ENCOUNTER (H): ICD-10-CM

## 2023-11-10 DIAGNOSIS — S72.145A CLOSED NONDISPLACED INTERTROCHANTERIC FRACTURE OF LEFT FEMUR, INITIAL ENCOUNTER (H): Primary | ICD-10-CM

## 2023-11-10 DIAGNOSIS — F41.9 ANXIETY: ICD-10-CM

## 2023-11-10 LAB
ABO/RH(D): NORMAL
ANTIBODY SCREEN: NEGATIVE
SPECIMEN EXPIRATION DATE: NORMAL

## 2023-11-10 PROCEDURE — 82374 ASSAY BLOOD CARBON DIOXIDE: CPT | Performed by: EMERGENCY MEDICINE

## 2023-11-10 PROCEDURE — 96375 TX/PRO/DX INJ NEW DRUG ADDON: CPT

## 2023-11-10 PROCEDURE — 86850 RBC ANTIBODY SCREEN: CPT | Performed by: EMERGENCY MEDICINE

## 2023-11-10 PROCEDURE — 86901 BLOOD TYPING SEROLOGIC RH(D): CPT | Performed by: EMERGENCY MEDICINE

## 2023-11-10 PROCEDURE — 250N000011 HC RX IP 250 OP 636: Performed by: EMERGENCY MEDICINE

## 2023-11-10 PROCEDURE — 36415 COLL VENOUS BLD VENIPUNCTURE: CPT | Performed by: EMERGENCY MEDICINE

## 2023-11-10 PROCEDURE — 99285 EMERGENCY DEPT VISIT HI MDM: CPT | Mod: 25

## 2023-11-10 PROCEDURE — 85025 COMPLETE CBC W/AUTO DIFF WBC: CPT | Performed by: EMERGENCY MEDICINE

## 2023-11-10 PROCEDURE — 96374 THER/PROPH/DIAG INJ IV PUSH: CPT

## 2023-11-10 RX ORDER — KETOROLAC TROMETHAMINE 15 MG/ML
15 INJECTION, SOLUTION INTRAMUSCULAR; INTRAVENOUS ONCE
Status: COMPLETED | OUTPATIENT
Start: 2023-11-10 | End: 2023-11-10

## 2023-11-10 RX ORDER — MORPHINE SULFATE 4 MG/ML
4 INJECTION, SOLUTION INTRAMUSCULAR; INTRAVENOUS
Status: DISCONTINUED | OUTPATIENT
Start: 2023-11-10 | End: 2023-11-12

## 2023-11-10 RX ADMIN — MORPHINE SULFATE 4 MG: 4 INJECTION, SOLUTION INTRAMUSCULAR; INTRAVENOUS at 23:07

## 2023-11-10 RX ADMIN — KETOROLAC TROMETHAMINE 15 MG: 15 INJECTION, SOLUTION INTRAMUSCULAR; INTRAVENOUS at 23:05

## 2023-11-10 ASSESSMENT — ACTIVITIES OF DAILY LIVING (ADL): ADLS_ACUITY_SCORE: 35

## 2023-11-11 ENCOUNTER — APPOINTMENT (OUTPATIENT)
Dept: GENERAL RADIOLOGY | Facility: CLINIC | Age: 78
DRG: 481 | End: 2023-11-11
Attending: EMERGENCY MEDICINE
Payer: MEDICARE

## 2023-11-11 ENCOUNTER — ANESTHESIA EVENT (OUTPATIENT)
Dept: SURGERY | Facility: CLINIC | Age: 78
DRG: 481 | End: 2023-11-11
Payer: MEDICARE

## 2023-11-11 ENCOUNTER — APPOINTMENT (OUTPATIENT)
Dept: CT IMAGING | Facility: CLINIC | Age: 78
DRG: 481 | End: 2023-11-11
Attending: EMERGENCY MEDICINE
Payer: MEDICARE

## 2023-11-11 ENCOUNTER — ANESTHESIA (OUTPATIENT)
Dept: SURGERY | Facility: CLINIC | Age: 78
DRG: 481 | End: 2023-11-11
Payer: MEDICARE

## 2023-11-11 ENCOUNTER — APPOINTMENT (OUTPATIENT)
Dept: GENERAL RADIOLOGY | Facility: CLINIC | Age: 78
DRG: 481 | End: 2023-11-11
Attending: ORTHOPAEDIC SURGERY
Payer: MEDICARE

## 2023-11-11 PROBLEM — S72.141A CLOSED DISPLACED INTERTROCHANTERIC FRACTURE OF RIGHT FEMUR, INITIAL ENCOUNTER (H): Status: ACTIVE | Noted: 2023-11-11

## 2023-11-11 LAB
ANION GAP SERPL CALCULATED.3IONS-SCNC: 8 MMOL/L (ref 7–15)
ANION GAP SERPL CALCULATED.3IONS-SCNC: 9 MMOL/L (ref 7–15)
APTT PPP: 30 SECONDS (ref 22–38)
ATRIAL RATE - MUSE: 93 BPM
BASOPHILS # BLD AUTO: 0.1 10E3/UL (ref 0–0.2)
BASOPHILS # BLD AUTO: 0.1 10E3/UL (ref 0–0.2)
BASOPHILS NFR BLD AUTO: 1 %
BASOPHILS NFR BLD AUTO: 1 %
BUN SERPL-MCNC: 17 MG/DL (ref 8–23)
BUN SERPL-MCNC: 17.3 MG/DL (ref 8–23)
CALCIUM SERPL-MCNC: 8.9 MG/DL (ref 8.8–10.2)
CALCIUM SERPL-MCNC: 9.1 MG/DL (ref 8.8–10.2)
CHLORIDE SERPL-SCNC: 97 MMOL/L (ref 98–107)
CHLORIDE SERPL-SCNC: 99 MMOL/L (ref 98–107)
CREAT SERPL-MCNC: 0.46 MG/DL (ref 0.51–0.95)
CREAT SERPL-MCNC: 0.55 MG/DL (ref 0.51–0.95)
CREAT SERPL-MCNC: 0.57 MG/DL (ref 0.51–0.95)
DEPRECATED HCO3 PLAS-SCNC: 26 MMOL/L (ref 22–29)
DEPRECATED HCO3 PLAS-SCNC: 29 MMOL/L (ref 22–29)
DIASTOLIC BLOOD PRESSURE - MUSE: NORMAL MMHG
EGFRCR SERPLBLD CKD-EPI 2021: >90 ML/MIN/1.73M2
EOSINOPHIL # BLD AUTO: 0.2 10E3/UL (ref 0–0.7)
EOSINOPHIL # BLD AUTO: 0.2 10E3/UL (ref 0–0.7)
EOSINOPHIL NFR BLD AUTO: 2 %
EOSINOPHIL NFR BLD AUTO: 2 %
ERYTHROCYTE [DISTWIDTH] IN BLOOD BY AUTOMATED COUNT: 14.4 % (ref 10–15)
ERYTHROCYTE [DISTWIDTH] IN BLOOD BY AUTOMATED COUNT: 14.6 % (ref 10–15)
GLUCOSE SERPL-MCNC: 106 MG/DL (ref 70–99)
GLUCOSE SERPL-MCNC: 97 MG/DL (ref 70–99)
HCT VFR BLD AUTO: 36.4 % (ref 35–47)
HCT VFR BLD AUTO: 37 % (ref 35–47)
HGB BLD-MCNC: 11.3 G/DL (ref 11.7–15.7)
HGB BLD-MCNC: 11.7 G/DL (ref 11.7–15.7)
IMM GRANULOCYTES # BLD: 0.1 10E3/UL
IMM GRANULOCYTES # BLD: 0.1 10E3/UL
IMM GRANULOCYTES NFR BLD: 1 %
IMM GRANULOCYTES NFR BLD: 1 %
INR PPP: 0.92 (ref 0.85–1.15)
INTERPRETATION ECG - MUSE: NORMAL
LYMPHOCYTES # BLD AUTO: 1.7 10E3/UL (ref 0.8–5.3)
LYMPHOCYTES # BLD AUTO: 1.8 10E3/UL (ref 0.8–5.3)
LYMPHOCYTES NFR BLD AUTO: 16 %
LYMPHOCYTES NFR BLD AUTO: 17 %
MCH RBC QN AUTO: 28.8 PG (ref 26.5–33)
MCH RBC QN AUTO: 28.9 PG (ref 26.5–33)
MCHC RBC AUTO-ENTMCNC: 31 G/DL (ref 31.5–36.5)
MCHC RBC AUTO-ENTMCNC: 31.6 G/DL (ref 31.5–36.5)
MCV RBC AUTO: 91 FL (ref 78–100)
MCV RBC AUTO: 93 FL (ref 78–100)
MONOCYTES # BLD AUTO: 1.2 10E3/UL (ref 0–1.3)
MONOCYTES # BLD AUTO: 1.4 10E3/UL (ref 0–1.3)
MONOCYTES NFR BLD AUTO: 11 %
MONOCYTES NFR BLD AUTO: 13 %
NEUTROPHILS # BLD AUTO: 7 10E3/UL (ref 1.6–8.3)
NEUTROPHILS # BLD AUTO: 7.7 10E3/UL (ref 1.6–8.3)
NEUTROPHILS NFR BLD AUTO: 66 %
NEUTROPHILS NFR BLD AUTO: 69 %
NRBC # BLD AUTO: 0 10E3/UL
NRBC # BLD AUTO: 0 10E3/UL
NRBC BLD AUTO-RTO: 0 /100
NRBC BLD AUTO-RTO: 0 /100
P AXIS - MUSE: 73 DEGREES
PLATELET # BLD AUTO: 338 10E3/UL (ref 150–450)
PLATELET # BLD AUTO: 367 10E3/UL (ref 150–450)
POTASSIUM SERPL-SCNC: 3.9 MMOL/L (ref 3.4–5.3)
POTASSIUM SERPL-SCNC: 4.9 MMOL/L (ref 3.4–5.3)
PR INTERVAL - MUSE: 136 MS
QRS DURATION - MUSE: 92 MS
QT - MUSE: 360 MS
QTC - MUSE: 447 MS
R AXIS - MUSE: 68 DEGREES
RBC # BLD AUTO: 3.91 10E6/UL (ref 3.8–5.2)
RBC # BLD AUTO: 4.06 10E6/UL (ref 3.8–5.2)
SODIUM SERPL-SCNC: 132 MMOL/L (ref 135–145)
SODIUM SERPL-SCNC: 136 MMOL/L (ref 135–145)
SYSTOLIC BLOOD PRESSURE - MUSE: NORMAL MMHG
T AXIS - MUSE: 75 DEGREES
VENTRICULAR RATE- MUSE: 93 BPM
WBC # BLD AUTO: 10.5 10E3/UL (ref 4–11)
WBC # BLD AUTO: 11 10E3/UL (ref 4–11)

## 2023-11-11 PROCEDURE — 250N000009 HC RX 250: Performed by: ORTHOPAEDIC SURGERY

## 2023-11-11 PROCEDURE — 370N000017 HC ANESTHESIA TECHNICAL FEE, PER MIN: Performed by: ORTHOPAEDIC SURGERY

## 2023-11-11 PROCEDURE — 120N000001 HC R&B MED SURG/OB

## 2023-11-11 PROCEDURE — 250N000025 HC SEVOFLURANE, PER MIN: Performed by: ORTHOPAEDIC SURGERY

## 2023-11-11 PROCEDURE — 73552 X-RAY EXAM OF FEMUR 2/>: CPT | Mod: RT

## 2023-11-11 PROCEDURE — C1713 ANCHOR/SCREW BN/BN,TIS/BN: HCPCS | Performed by: ORTHOPAEDIC SURGERY

## 2023-11-11 PROCEDURE — 250N000011 HC RX IP 250 OP 636: Performed by: EMERGENCY MEDICINE

## 2023-11-11 PROCEDURE — 250N000011 HC RX IP 250 OP 636: Performed by: ORTHOPAEDIC SURGERY

## 2023-11-11 PROCEDURE — 999N000141 HC STATISTIC PRE-PROCEDURE NURSING ASSESSMENT: Performed by: ORTHOPAEDIC SURGERY

## 2023-11-11 PROCEDURE — 250N000011 HC RX IP 250 OP 636: Performed by: HOSPITALIST

## 2023-11-11 PROCEDURE — 85025 COMPLETE CBC W/AUTO DIFF WBC: CPT | Performed by: INTERNAL MEDICINE

## 2023-11-11 PROCEDURE — 250N000009 HC RX 250

## 2023-11-11 PROCEDURE — G1010 CDSM STANSON: HCPCS

## 2023-11-11 PROCEDURE — 258N000003 HC RX IP 258 OP 636

## 2023-11-11 PROCEDURE — 250N000011 HC RX IP 250 OP 636

## 2023-11-11 PROCEDURE — 36415 COLL VENOUS BLD VENIPUNCTURE: CPT | Performed by: INTERNAL MEDICINE

## 2023-11-11 PROCEDURE — 82310 ASSAY OF CALCIUM: CPT | Performed by: INTERNAL MEDICINE

## 2023-11-11 PROCEDURE — 36415 COLL VENOUS BLD VENIPUNCTURE: CPT

## 2023-11-11 PROCEDURE — 250N000013 HC RX MED GY IP 250 OP 250 PS 637

## 2023-11-11 PROCEDURE — 710N000009 HC RECOVERY PHASE 1, LEVEL 1, PER MIN: Performed by: ORTHOPAEDIC SURGERY

## 2023-11-11 PROCEDURE — 250N000013 HC RX MED GY IP 250 OP 250 PS 637: Performed by: INTERNAL MEDICINE

## 2023-11-11 PROCEDURE — 250N000011 HC RX IP 250 OP 636: Mod: JZ | Performed by: INTERNAL MEDICINE

## 2023-11-11 PROCEDURE — 74177 CT ABD & PELVIS W/CONTRAST: CPT | Mod: MG

## 2023-11-11 PROCEDURE — 85730 THROMBOPLASTIN TIME PARTIAL: CPT | Performed by: INTERNAL MEDICINE

## 2023-11-11 PROCEDURE — 360N000084 HC SURGERY LEVEL 4 W/ FLUORO, PER MIN: Performed by: ORTHOPAEDIC SURGERY

## 2023-11-11 PROCEDURE — 82565 ASSAY OF CREATININE: CPT

## 2023-11-11 PROCEDURE — 99222 1ST HOSP IP/OBS MODERATE 55: CPT | Mod: AI | Performed by: INTERNAL MEDICINE

## 2023-11-11 PROCEDURE — 85610 PROTHROMBIN TIME: CPT | Performed by: INTERNAL MEDICINE

## 2023-11-11 PROCEDURE — 73070 X-RAY EXAM OF ELBOW: CPT | Mod: RT

## 2023-11-11 PROCEDURE — 999N000179 XR SURGERY CARM FLUORO LESS THAN 5 MIN W STILLS: Mod: TC

## 2023-11-11 PROCEDURE — 250N000013 HC RX MED GY IP 250 OP 250 PS 637: Performed by: HOSPITALIST

## 2023-11-11 PROCEDURE — 272N000001 HC OR GENERAL SUPPLY STERILE: Performed by: ORTHOPAEDIC SURGERY

## 2023-11-11 PROCEDURE — 0QS606Z REPOSITION RIGHT UPPER FEMUR WITH INTRAMEDULLARY INTERNAL FIXATION DEVICE, OPEN APPROACH: ICD-10-PCS | Performed by: ORTHOPAEDIC SURGERY

## 2023-11-11 DEVICE — IMPLANTABLE DEVICE: Type: IMPLANTABLE DEVICE | Site: FEMUR | Status: FUNCTIONAL

## 2023-11-11 DEVICE — IMP WIRE KIRSCHNER STRK 3.0X285MM 1806-0050S: Type: IMPLANTABLE DEVICE | Site: FEMUR | Status: FUNCTIONAL

## 2023-11-11 DEVICE — IMP SCR STRK LOCK 5.0X37.5MM FT 1896-5037S: Type: IMPLANTABLE DEVICE | Site: FEMUR | Status: FUNCTIONAL

## 2023-11-11 DEVICE — IMP WIRE KIRSCHNER 3.2X450MM 1210-6450S: Type: IMPLANTABLE DEVICE | Site: FEMUR | Status: FUNCTIONAL

## 2023-11-11 RX ORDER — CEFAZOLIN SODIUM/WATER 2 G/20 ML
2 SYRINGE (ML) INTRAVENOUS SEE ADMIN INSTRUCTIONS
Status: DISCONTINUED | OUTPATIENT
Start: 2023-11-11 | End: 2023-11-11 | Stop reason: HOSPADM

## 2023-11-11 RX ORDER — ONDANSETRON 2 MG/ML
4 INJECTION INTRAMUSCULAR; INTRAVENOUS EVERY 30 MIN PRN
Status: DISCONTINUED | OUTPATIENT
Start: 2023-11-11 | End: 2023-11-11 | Stop reason: HOSPADM

## 2023-11-11 RX ORDER — GABAPENTIN 100 MG/1
100 CAPSULE ORAL 3 TIMES DAILY
Status: DISCONTINUED | OUTPATIENT
Start: 2023-11-11 | End: 2023-11-13

## 2023-11-11 RX ORDER — IOPAMIDOL 755 MG/ML
500 INJECTION, SOLUTION INTRAVASCULAR ONCE
Status: COMPLETED | OUTPATIENT
Start: 2023-11-11 | End: 2023-11-11

## 2023-11-11 RX ORDER — KETOROLAC TROMETHAMINE 30 MG/ML
INJECTION, SOLUTION INTRAMUSCULAR; INTRAVENOUS PRN
Status: DISCONTINUED | OUTPATIENT
Start: 2023-11-11 | End: 2023-11-11

## 2023-11-11 RX ORDER — BUPIVACAINE HYDROCHLORIDE AND EPINEPHRINE 2.5; 5 MG/ML; UG/ML
30 INJECTION, SOLUTION EPIDURAL; INFILTRATION; INTRACAUDAL; PERINEURAL ONCE
Qty: 30 ML | Refills: 0 | Status: COMPLETED | OUTPATIENT
Start: 2023-11-11 | End: 2023-11-11

## 2023-11-11 RX ORDER — ACETAMINOPHEN 325 MG/1
975 TABLET ORAL ONCE
Status: DISCONTINUED | OUTPATIENT
Start: 2023-11-11 | End: 2023-11-11 | Stop reason: HOSPADM

## 2023-11-11 RX ORDER — FENTANYL CITRATE 50 UG/ML
INJECTION, SOLUTION INTRAMUSCULAR; INTRAVENOUS PRN
Status: DISCONTINUED | OUTPATIENT
Start: 2023-11-11 | End: 2023-11-11

## 2023-11-11 RX ORDER — LABETALOL HYDROCHLORIDE 5 MG/ML
10 INJECTION, SOLUTION INTRAVENOUS EVERY 5 MIN PRN
Status: DISCONTINUED | OUTPATIENT
Start: 2023-11-11 | End: 2023-11-11 | Stop reason: HOSPADM

## 2023-11-11 RX ORDER — CARBIDOPA AND LEVODOPA 25; 100 MG/1; MG/1
2 TABLET, EXTENDED RELEASE ORAL 3 TIMES DAILY
Status: DISCONTINUED | OUTPATIENT
Start: 2023-11-11 | End: 2023-11-13

## 2023-11-11 RX ORDER — ACETAMINOPHEN 325 MG/1
975 TABLET ORAL EVERY 8 HOURS
Qty: 27 TABLET | Refills: 0 | Status: DISPENSED | OUTPATIENT
Start: 2023-11-11 | End: 2023-11-14

## 2023-11-11 RX ORDER — DIVALPROEX SODIUM 125 MG/1
125 TABLET, DELAYED RELEASE ORAL 2 TIMES DAILY
Status: ON HOLD | COMMUNITY
End: 2023-11-16

## 2023-11-11 RX ORDER — ONDANSETRON 4 MG/1
4 TABLET, ORALLY DISINTEGRATING ORAL EVERY 6 HOURS PRN
Status: DISCONTINUED | OUTPATIENT
Start: 2023-11-11 | End: 2023-11-16 | Stop reason: HOSPADM

## 2023-11-11 RX ORDER — LORAZEPAM 0.5 MG/1
0.5 TABLET ORAL DAILY PRN
Status: ON HOLD | COMMUNITY
End: 2023-11-16

## 2023-11-11 RX ORDER — ONDANSETRON 4 MG/1
4 TABLET, ORALLY DISINTEGRATING ORAL EVERY 30 MIN PRN
Status: DISCONTINUED | OUTPATIENT
Start: 2023-11-11 | End: 2023-11-11 | Stop reason: HOSPADM

## 2023-11-11 RX ORDER — OXYCODONE HYDROCHLORIDE 5 MG/1
10 TABLET ORAL
Status: DISCONTINUED | OUTPATIENT
Start: 2023-11-11 | End: 2023-11-11 | Stop reason: HOSPADM

## 2023-11-11 RX ORDER — AMOXICILLIN 250 MG
1 CAPSULE ORAL 2 TIMES DAILY PRN
Status: DISCONTINUED | OUTPATIENT
Start: 2023-11-11 | End: 2023-11-12

## 2023-11-11 RX ORDER — ONDANSETRON 4 MG/1
4 TABLET, ORALLY DISINTEGRATING ORAL EVERY 6 HOURS PRN
Status: DISCONTINUED | OUTPATIENT
Start: 2023-11-11 | End: 2023-11-11

## 2023-11-11 RX ORDER — LIDOCAINE 40 MG/G
CREAM TOPICAL
Status: DISCONTINUED | OUTPATIENT
Start: 2023-11-11 | End: 2023-11-11 | Stop reason: HOSPADM

## 2023-11-11 RX ORDER — LORAZEPAM 0.5 MG/1
0.5 TABLET ORAL AT BEDTIME
Status: DISCONTINUED | OUTPATIENT
Start: 2023-11-11 | End: 2023-11-13

## 2023-11-11 RX ORDER — TRAZODONE HYDROCHLORIDE 50 MG/1
50 TABLET, FILM COATED ORAL AT BEDTIME
Status: DISCONTINUED | OUTPATIENT
Start: 2023-11-11 | End: 2023-11-13

## 2023-11-11 RX ORDER — BISACODYL 10 MG
10 SUPPOSITORY, RECTAL RECTAL DAILY PRN
COMMUNITY

## 2023-11-11 RX ORDER — HYDRALAZINE HYDROCHLORIDE 20 MG/ML
10 INJECTION INTRAMUSCULAR; INTRAVENOUS EVERY 4 HOURS PRN
Status: DISCONTINUED | OUTPATIENT
Start: 2023-11-11 | End: 2023-11-16 | Stop reason: HOSPADM

## 2023-11-11 RX ORDER — SODIUM CHLORIDE, SODIUM LACTATE, POTASSIUM CHLORIDE, CALCIUM CHLORIDE 600; 310; 30; 20 MG/100ML; MG/100ML; MG/100ML; MG/100ML
INJECTION, SOLUTION INTRAVENOUS CONTINUOUS
Status: DISCONTINUED | OUTPATIENT
Start: 2023-11-11 | End: 2023-11-11 | Stop reason: HOSPADM

## 2023-11-11 RX ORDER — ONDANSETRON 2 MG/ML
4 INJECTION INTRAMUSCULAR; INTRAVENOUS EVERY 6 HOURS PRN
Status: DISCONTINUED | OUTPATIENT
Start: 2023-11-11 | End: 2023-11-16 | Stop reason: HOSPADM

## 2023-11-11 RX ORDER — HALOPERIDOL 5 MG/ML
2 INJECTION INTRAMUSCULAR EVERY 6 HOURS PRN
Status: DISCONTINUED | OUTPATIENT
Start: 2023-11-11 | End: 2023-11-16 | Stop reason: HOSPADM

## 2023-11-11 RX ORDER — AMOXICILLIN 250 MG
1 CAPSULE ORAL 2 TIMES DAILY
Status: DISCONTINUED | OUTPATIENT
Start: 2023-11-11 | End: 2023-11-16 | Stop reason: HOSPADM

## 2023-11-11 RX ORDER — GABAPENTIN 100 MG/1
100 CAPSULE ORAL 3 TIMES DAILY
COMMUNITY

## 2023-11-11 RX ORDER — FENTANYL CITRATE 50 UG/ML
25 INJECTION, SOLUTION INTRAMUSCULAR; INTRAVENOUS EVERY 5 MIN PRN
Status: DISCONTINUED | OUTPATIENT
Start: 2023-11-11 | End: 2023-11-11 | Stop reason: HOSPADM

## 2023-11-11 RX ORDER — SODIUM CHLORIDE, SODIUM LACTATE, POTASSIUM CHLORIDE, CALCIUM CHLORIDE 600; 310; 30; 20 MG/100ML; MG/100ML; MG/100ML; MG/100ML
INJECTION, SOLUTION INTRAVENOUS CONTINUOUS
Status: DISCONTINUED | OUTPATIENT
Start: 2023-11-11 | End: 2023-11-13

## 2023-11-11 RX ORDER — HYDROMORPHONE HCL IN WATER/PF 6 MG/30 ML
0.2 PATIENT CONTROLLED ANALGESIA SYRINGE INTRAVENOUS EVERY 5 MIN PRN
Status: DISCONTINUED | OUTPATIENT
Start: 2023-11-11 | End: 2023-11-11 | Stop reason: HOSPADM

## 2023-11-11 RX ORDER — PROPOFOL 10 MG/ML
INJECTION, EMULSION INTRAVENOUS PRN
Status: DISCONTINUED | OUTPATIENT
Start: 2023-11-11 | End: 2023-11-11

## 2023-11-11 RX ORDER — OXYBUTYNIN CHLORIDE 5 MG/1
5 TABLET, EXTENDED RELEASE ORAL AT BEDTIME
Status: DISCONTINUED | OUTPATIENT
Start: 2023-11-11 | End: 2023-11-11

## 2023-11-11 RX ORDER — TRANEXAMIC ACID 10 MG/ML
1 INJECTION, SOLUTION INTRAVENOUS ONCE
Status: DISCONTINUED | OUTPATIENT
Start: 2023-11-11 | End: 2023-11-11 | Stop reason: HOSPADM

## 2023-11-11 RX ORDER — VITAMIN B COMPLEX
50 TABLET ORAL DAILY
Status: DISCONTINUED | OUTPATIENT
Start: 2023-11-11 | End: 2023-11-16 | Stop reason: HOSPADM

## 2023-11-11 RX ORDER — TRANEXAMIC ACID 10 MG/ML
1 INJECTION, SOLUTION INTRAVENOUS ONCE
Status: COMPLETED | OUTPATIENT
Start: 2023-11-11 | End: 2023-11-11

## 2023-11-11 RX ORDER — LORAZEPAM 0.5 MG/1
0.5 TABLET ORAL AT BEDTIME
Status: ON HOLD | COMMUNITY
End: 2023-11-16

## 2023-11-11 RX ORDER — DIVALPROEX SODIUM 125 MG/1
125 TABLET, DELAYED RELEASE ORAL EVERY 12 HOURS SCHEDULED
Status: DISCONTINUED | OUTPATIENT
Start: 2023-11-11 | End: 2023-11-13

## 2023-11-11 RX ORDER — ACETAMINOPHEN 500 MG
500 TABLET ORAL 3 TIMES DAILY
Status: DISCONTINUED | OUTPATIENT
Start: 2023-11-11 | End: 2023-11-11

## 2023-11-11 RX ORDER — MEPERIDINE HYDROCHLORIDE 25 MG/ML
12.5 INJECTION INTRAMUSCULAR; INTRAVENOUS; SUBCUTANEOUS EVERY 5 MIN PRN
Status: DISCONTINUED | OUTPATIENT
Start: 2023-11-11 | End: 2023-11-11 | Stop reason: HOSPADM

## 2023-11-11 RX ORDER — MINERAL OIL/HYDROPHIL PETROLAT
OINTMENT (GRAM) TOPICAL 2 TIMES DAILY
COMMUNITY

## 2023-11-11 RX ORDER — DULOXETIN HYDROCHLORIDE 60 MG/1
60 CAPSULE, DELAYED RELEASE ORAL DAILY
COMMUNITY

## 2023-11-11 RX ORDER — GLYCOPYRROLATE 0.2 MG/ML
INJECTION, SOLUTION INTRAMUSCULAR; INTRAVENOUS PRN
Status: DISCONTINUED | OUTPATIENT
Start: 2023-11-11 | End: 2023-11-11

## 2023-11-11 RX ORDER — TRAZODONE HYDROCHLORIDE 50 MG/1
50 TABLET, FILM COATED ORAL AT BEDTIME
Status: ON HOLD | COMMUNITY
End: 2023-11-16

## 2023-11-11 RX ORDER — SODIUM CHLORIDE, SODIUM LACTATE, POTASSIUM CHLORIDE, CALCIUM CHLORIDE 600; 310; 30; 20 MG/100ML; MG/100ML; MG/100ML; MG/100ML
INJECTION, SOLUTION INTRAVENOUS CONTINUOUS PRN
Status: DISCONTINUED | OUTPATIENT
Start: 2023-11-11 | End: 2023-11-11

## 2023-11-11 RX ORDER — MAGNESIUM HYDROXIDE/ALUMINUM HYDROXICE/SIMETHICONE 120; 1200; 1200 MG/30ML; MG/30ML; MG/30ML
30 SUSPENSION ORAL DAILY PRN
COMMUNITY

## 2023-11-11 RX ORDER — HALOPERIDOL 5 MG/ML
1 INJECTION INTRAMUSCULAR
Status: DISCONTINUED | OUTPATIENT
Start: 2023-11-11 | End: 2023-11-11 | Stop reason: HOSPADM

## 2023-11-11 RX ORDER — LABETALOL HYDROCHLORIDE 5 MG/ML
10 INJECTION, SOLUTION INTRAVENOUS
Status: DISCONTINUED | OUTPATIENT
Start: 2023-11-11 | End: 2023-11-16 | Stop reason: HOSPADM

## 2023-11-11 RX ORDER — MEXILETINE HYDROCHLORIDE 150 MG/1
150 CAPSULE ORAL 2 TIMES DAILY
Status: DISCONTINUED | OUTPATIENT
Start: 2023-11-11 | End: 2023-11-11

## 2023-11-11 RX ORDER — LIDOCAINE 40 MG/G
CREAM TOPICAL
Status: DISCONTINUED | OUTPATIENT
Start: 2023-11-11 | End: 2023-11-16 | Stop reason: HOSPADM

## 2023-11-11 RX ORDER — DEXMEDETOMIDINE HYDROCHLORIDE 4 UG/ML
INJECTION, SOLUTION INTRAVENOUS CONTINUOUS PRN
Status: DISCONTINUED | OUTPATIENT
Start: 2023-11-11 | End: 2023-11-11

## 2023-11-11 RX ORDER — ACETAMINOPHEN 325 MG/1
650 TABLET ORAL EVERY 4 HOURS PRN
Status: DISCONTINUED | OUTPATIENT
Start: 2023-11-14 | End: 2023-11-13

## 2023-11-11 RX ORDER — PROCHLORPERAZINE 25 MG
12.5 SUPPOSITORY, RECTAL RECTAL EVERY 12 HOURS PRN
Status: DISCONTINUED | OUTPATIENT
Start: 2023-11-11 | End: 2023-11-16 | Stop reason: HOSPADM

## 2023-11-11 RX ORDER — FENTANYL CITRATE 50 UG/ML
50 INJECTION, SOLUTION INTRAMUSCULAR; INTRAVENOUS EVERY 5 MIN PRN
Status: DISCONTINUED | OUTPATIENT
Start: 2023-11-11 | End: 2023-11-11 | Stop reason: HOSPADM

## 2023-11-11 RX ORDER — LORAZEPAM 2 MG/ML
0.5 INJECTION INTRAMUSCULAR EVERY 4 HOURS PRN
Status: DISCONTINUED | OUTPATIENT
Start: 2023-11-11 | End: 2023-11-13

## 2023-11-11 RX ORDER — ENOXAPARIN SODIUM 100 MG/ML
40 INJECTION SUBCUTANEOUS EVERY 24 HOURS
Status: DISCONTINUED | OUTPATIENT
Start: 2023-11-12 | End: 2023-11-16 | Stop reason: HOSPADM

## 2023-11-11 RX ORDER — HYDROMORPHONE HCL IN WATER/PF 6 MG/30 ML
0.4 PATIENT CONTROLLED ANALGESIA SYRINGE INTRAVENOUS EVERY 5 MIN PRN
Status: DISCONTINUED | OUTPATIENT
Start: 2023-11-11 | End: 2023-11-11 | Stop reason: HOSPADM

## 2023-11-11 RX ORDER — HYDROMORPHONE HYDROCHLORIDE 1 MG/ML
0.5 INJECTION, SOLUTION INTRAMUSCULAR; INTRAVENOUS; SUBCUTANEOUS
Status: DISCONTINUED | OUTPATIENT
Start: 2023-11-11 | End: 2023-11-12

## 2023-11-11 RX ORDER — POLYETHYLENE GLYCOL 3350 17 G/17G
17 POWDER, FOR SOLUTION ORAL DAILY
Status: DISCONTINUED | OUTPATIENT
Start: 2023-11-12 | End: 2023-11-16 | Stop reason: HOSPADM

## 2023-11-11 RX ORDER — PROCHLORPERAZINE MALEATE 5 MG
5 TABLET ORAL EVERY 6 HOURS PRN
Status: DISCONTINUED | OUTPATIENT
Start: 2023-11-11 | End: 2023-11-12

## 2023-11-11 RX ORDER — CEFAZOLIN SODIUM 1 G/3ML
1 INJECTION, POWDER, FOR SOLUTION INTRAMUSCULAR; INTRAVENOUS EVERY 8 HOURS
Qty: 10 ML | Refills: 0 | Status: COMPLETED | OUTPATIENT
Start: 2023-11-11 | End: 2023-11-12

## 2023-11-11 RX ORDER — METHADONE HYDROCHLORIDE 10 MG/ML
INJECTION, SOLUTION INTRAMUSCULAR; INTRAVENOUS; SUBCUTANEOUS PRN
Status: DISCONTINUED | OUTPATIENT
Start: 2023-11-11 | End: 2023-11-11

## 2023-11-11 RX ORDER — CARBIDOPA AND LEVODOPA 25; 100 MG/1; MG/1
2 TABLET ORAL 3 TIMES DAILY
Status: ON HOLD | COMMUNITY
End: 2023-11-16

## 2023-11-11 RX ORDER — OXYCODONE HYDROCHLORIDE 5 MG/1
5 TABLET ORAL
Status: DISCONTINUED | OUTPATIENT
Start: 2023-11-11 | End: 2023-11-11 | Stop reason: HOSPADM

## 2023-11-11 RX ORDER — ONDANSETRON 2 MG/ML
4 INJECTION INTRAMUSCULAR; INTRAVENOUS EVERY 6 HOURS PRN
Status: DISCONTINUED | OUTPATIENT
Start: 2023-11-11 | End: 2023-11-11

## 2023-11-11 RX ORDER — PROCHLORPERAZINE MALEATE 5 MG
5 TABLET ORAL EVERY 6 HOURS PRN
Status: DISCONTINUED | OUTPATIENT
Start: 2023-11-11 | End: 2023-11-16 | Stop reason: HOSPADM

## 2023-11-11 RX ORDER — AMOXICILLIN 250 MG
2 CAPSULE ORAL 2 TIMES DAILY PRN
COMMUNITY

## 2023-11-11 RX ORDER — CHOLECALCIFEROL (VITAMIN D3) 50 MCG
1 TABLET ORAL DAILY
COMMUNITY

## 2023-11-11 RX ORDER — HYDROMORPHONE HCL IN WATER/PF 6 MG/30 ML
0.2 PATIENT CONTROLLED ANALGESIA SYRINGE INTRAVENOUS
Status: DISCONTINUED | OUTPATIENT
Start: 2023-11-11 | End: 2023-11-14

## 2023-11-11 RX ORDER — DEXAMETHASONE SODIUM PHOSPHATE 4 MG/ML
INJECTION, SOLUTION INTRA-ARTICULAR; INTRALESIONAL; INTRAMUSCULAR; INTRAVENOUS; SOFT TISSUE PRN
Status: DISCONTINUED | OUTPATIENT
Start: 2023-11-11 | End: 2023-11-11

## 2023-11-11 RX ORDER — KETOROLAC TROMETHAMINE 15 MG/ML
15 INJECTION, SOLUTION INTRAMUSCULAR; INTRAVENOUS
Status: DISCONTINUED | OUTPATIENT
Start: 2023-11-11 | End: 2023-11-11 | Stop reason: HOSPADM

## 2023-11-11 RX ORDER — CEFAZOLIN SODIUM/WATER 2 G/20 ML
2 SYRINGE (ML) INTRAVENOUS
Status: COMPLETED | OUTPATIENT
Start: 2023-11-11 | End: 2023-11-11

## 2023-11-11 RX ADMIN — PROPOFOL 100 MG: 10 INJECTION, EMULSION INTRAVENOUS at 13:16

## 2023-11-11 RX ADMIN — CEFAZOLIN 1 G: 1 INJECTION, POWDER, FOR SOLUTION INTRAMUSCULAR; INTRAVENOUS at 21:08

## 2023-11-11 RX ADMIN — ROCURONIUM BROMIDE 90 MG: 50 INJECTION, SOLUTION INTRAVENOUS at 13:16

## 2023-11-11 RX ADMIN — DEXAMETHASONE SODIUM PHOSPHATE 4 MG: 4 INJECTION, SOLUTION INTRA-ARTICULAR; INTRALESIONAL; INTRAMUSCULAR; INTRAVENOUS; SOFT TISSUE at 13:30

## 2023-11-11 RX ADMIN — CARBIDOPA AND LEVODOPA 2 TABLET: 25; 100 TABLET, EXTENDED RELEASE ORAL at 16:55

## 2023-11-11 RX ADMIN — TRAZODONE HYDROCHLORIDE 50 MG: 50 TABLET ORAL at 21:16

## 2023-11-11 RX ADMIN — GLYCOPYRROLATE 0.2 MG: 0.2 INJECTION, SOLUTION INTRAMUSCULAR; INTRAVENOUS at 13:30

## 2023-11-11 RX ADMIN — SUGAMMADEX 300 MG: 100 INJECTION, SOLUTION INTRAVENOUS at 14:03

## 2023-11-11 RX ADMIN — SUGAMMADEX 200 MG: 100 INJECTION, SOLUTION INTRAVENOUS at 14:15

## 2023-11-11 RX ADMIN — Medication 2 G: at 13:10

## 2023-11-11 RX ADMIN — ACETAMINOPHEN 975 MG: 325 TABLET, FILM COATED ORAL at 21:10

## 2023-11-11 RX ADMIN — LORAZEPAM 0.5 MG: 0.5 TABLET ORAL at 21:11

## 2023-11-11 RX ADMIN — KETOROLAC TROMETHAMINE 15 MG: 30 INJECTION, SOLUTION INTRAMUSCULAR at 14:05

## 2023-11-11 RX ADMIN — PHENYLEPHRINE HYDROCHLORIDE 100 MCG: 10 INJECTION INTRAVENOUS at 13:37

## 2023-11-11 RX ADMIN — HYDROMORPHONE HYDROCHLORIDE 0.2 MG: 0.2 INJECTION, SOLUTION INTRAMUSCULAR; INTRAVENOUS; SUBCUTANEOUS at 17:52

## 2023-11-11 RX ADMIN — SODIUM CHLORIDE, POTASSIUM CHLORIDE, SODIUM LACTATE AND CALCIUM CHLORIDE: 600; 310; 30; 20 INJECTION, SOLUTION INTRAVENOUS at 13:10

## 2023-11-11 RX ADMIN — Medication 10 MG: at 13:21

## 2023-11-11 RX ADMIN — PHENYLEPHRINE HYDROCHLORIDE 100 MCG: 10 INJECTION INTRAVENOUS at 13:54

## 2023-11-11 RX ADMIN — TRANEXAMIC ACID 1 G: 10 INJECTION, SOLUTION INTRAVENOUS at 13:20

## 2023-11-11 RX ADMIN — DIVALPROEX SODIUM 125 MG: 125 TABLET, DELAYED RELEASE ORAL at 11:30

## 2023-11-11 RX ADMIN — PROPOFOL 50 MG: 10 INJECTION, EMULSION INTRAVENOUS at 13:41

## 2023-11-11 RX ADMIN — GABAPENTIN 100 MG: 100 CAPSULE ORAL at 21:11

## 2023-11-11 RX ADMIN — SENNOSIDES AND DOCUSATE SODIUM 1 TABLET: 8.6; 5 TABLET ORAL at 21:11

## 2023-11-11 RX ADMIN — IOPAMIDOL 60 ML: 755 INJECTION, SOLUTION INTRAVENOUS at 00:50

## 2023-11-11 RX ADMIN — ONDANSETRON 4 MG: 2 INJECTION INTRAMUSCULAR; INTRAVENOUS at 14:03

## 2023-11-11 RX ADMIN — HYDROMORPHONE HYDROCHLORIDE 0.2 MG: 0.2 INJECTION, SOLUTION INTRAMUSCULAR; INTRAVENOUS; SUBCUTANEOUS at 08:58

## 2023-11-11 RX ADMIN — CARBIDOPA AND LEVODOPA 2 TABLET: 25; 100 TABLET, EXTENDED RELEASE ORAL at 11:29

## 2023-11-11 RX ADMIN — Medication 0.3 MCG/KG/HR: at 13:27

## 2023-11-11 RX ADMIN — DIVALPROEX SODIUM 125 MG: 125 TABLET, DELAYED RELEASE ORAL at 21:11

## 2023-11-11 RX ADMIN — LORAZEPAM 0.5 MG: 2 INJECTION INTRAMUSCULAR; INTRAVENOUS at 10:30

## 2023-11-11 RX ADMIN — ACETAMINOPHEN 975 MG: 325 TABLET, FILM COATED ORAL at 04:54

## 2023-11-11 RX ADMIN — FENTANYL CITRATE 100 MCG: 50 INJECTION INTRAMUSCULAR; INTRAVENOUS at 13:16

## 2023-11-11 RX ADMIN — PHENYLEPHRINE HYDROCHLORIDE 200 MCG: 10 INJECTION INTRAVENOUS at 13:25

## 2023-11-11 ASSESSMENT — ACTIVITIES OF DAILY LIVING (ADL)
ADLS_ACUITY_SCORE: 47
WEAR_GLASSES_OR_BLIND: YES
ADLS_ACUITY_SCORE: 39
ADLS_ACUITY_SCORE: 47
ADLS_ACUITY_SCORE: 41
ADLS_ACUITY_SCORE: 45
ADLS_ACUITY_SCORE: 35
ADLS_ACUITY_SCORE: 45
ADLS_ACUITY_SCORE: 39
ADLS_ACUITY_SCORE: 37
EQUIPMENT_CURRENTLY_USED_AT_HOME: WHEELCHAIR, MANUAL
ADLS_ACUITY_SCORE: 41
ADLS_ACUITY_SCORE: 47
ADLS_ACUITY_SCORE: 37

## 2023-11-11 NOTE — CONSULTS
[facility]    Orthopedics Consultation    Date of Admission:  11/10/2023    Assessment & Plan      Right intertrochanteric femur fracture    Plan:    I recommended operative treatment of the patient's right intertrochanteric femur fracture by cephalomedullary nail fixation.  We discussed the natural history of this diagnosis, and the plan for surgery.  We discussed the risks and benefits of the procedure, as well as the expected post-operative course.    Principal Problem:    Closed displaced intertrochanteric fracture of right femur, initial encounter (H)    Chin Kemp MD     Code Status    No CPR- Do NOT Intubate    Reason for Consult   Reason for consult: I was asked by Dr. Adams to evaluate this patient for a right intertrochanteric femur fracture.    Primary Care Physician   Jessica Thompson    Chief Complaint   Right hip pain, ground level fall    History is obtained from the electronic health record and patient's family    History of Present Illness   David Hogan is a 78 year old female who presents with a right intertrochanteric femur fracture due to a presumed mechanical ground-level fall at her [facility].  Her fall was unwitnessed.  Upon presentation she complained of elbow hip back and neck pain.  She also reported abdominal pain.  Some of these are chronic concerns per her .  She was evaluated and found to have a right intertrochanteric femur fracture and orthopedics was consulted.  She denies any antecedent pain and the family confirms this.  She is very minimally ambulatory due to Parkinson's and dementia.  She does use a gait aid specifically a walker on the rare occasion she walks.  She denies any other symptoms.  Her history is quite limited secondary to her dementia.    Past Medical History   I have reviewed this patient's medical history and updated it with pertinent information if needed.   Past Medical History:   Diagnosis Date    Hypertension         Past Surgical History   I have reviewed this patient's surgical history and updated it with pertinent information if needed.  Past Surgical History:   Procedure Laterality Date    BACK SURGERY      2019    BIOPSY MUSCLE DIAGNOSTIC (LOCATION) Left 9/10/2020    Procedure: LEFT QUAD MUSCLE BIOPSY;  Surgeon: Yazan Chanel MD;  Location: [facility] OR    OPEN REDUCTION INTERNAL FIXATION RODDING INTRAMEDULLAR FEMUR FRACTURE TABLE Left 10/11/2020    Procedure: Open reduction internal fixation left intertrochanteric femur fracture with an intramedullary nail;  Surgeon: Huan Feliciano MD;  Location:  OR       Prior to Admission Medications   Prior to Admission Medications   Prescriptions Last Dose Informant Patient Reported? Taking?   DULoxetine (CYMBALTA) 60 MG capsule 11/9/2023 at am  Yes Yes   Sig: Take 60 mg by mouth daily   LORazepam (ATIVAN) 0.5 MG tablet 11/9/2023  Yes Yes   Sig: Take 0.5 mg by mouth at bedtime   LORazepam (ATIVAN) 0.5 MG tablet   Yes Yes   Sig: Take 0.5 mg by mouth daily as needed for anxiety   acetaminophen (TYLENOL) 500 MG tablet 11/9/2023 at pm  Yes Yes   Sig: Take 500 mg by mouth 3 times daily 730am, 2pm, 630pm   alum & mag hydroxide-simethicone (MAALOX) 200-200-20 MG/5ML SUSP suspension   Yes Yes   Sig: Take 30 mLs by mouth daily as needed for indigestion   bisacodyl (DULCOLAX) 10 MG suppository   Yes Yes   Sig: Place 10 mg rectally daily as needed for constipation   carbidopa-levodopa (SINEMET)  MG tablet 11/9/2023 at pm  Yes Yes   Sig: Take 2 tablets by mouth 3 times daily Times: 0730, 1130, 1630   divalproex sodium delayed-release (DEPAKOTE) 125 MG DR tablet 11/9/2023 at pm  Yes Yes   Sig: Take 125 mg by mouth 2 times daily Mood disorder   gabapentin (NEURONTIN) 100 MG capsule 11/9/2023 at pm  Yes Yes   Sig: Take 100 mg by mouth 3 times daily 8am, 2pm, 8 pm   menthol-zinc oxide (CALMOSEPTINE) 0.44-20.6 % OINT ointment   Yes Yes   Sig: Apply topically 3 times daily as needed for  skin protection   mineral oil-hydrophilic petrolatum (AQUAPHOR) external ointment 11/9/2023 at pm  Yes Yes   Sig: Apply topically 2 times daily To lower facial rash   order for DME   No No   Sig: Equipment being ordered: Walker Wheels () and Walker ()  Treatment Diagnosis: Impaired functional mobility   polyethylene glycol (MIRALAX) 17 g packet   No Yes   Sig: Take 17 g by mouth daily as needed for constipation   pramox-pe-glycerin-petrolatum (PREPARATION H) 1-0.25-14.4-15 % CREA cream   Yes Yes   Sig: Place rectally 2 times daily as needed for hemorrhoids   predniSONE (DELTASONE) 5 MG tablet 11/10/2023 at am  Yes Yes   Sig: Take 25 mg by mouth twice a week on Saturday and Sunday   senna-docusate (SENOKOT-S/PERICOLACE) 8.6-50 MG tablet   Yes Yes   Sig: Take 2 tablets by mouth 2 times daily as needed for constipation   traZODone (DESYREL) 50 MG tablet 11/9/2023 at hs  Yes Yes   Sig: Take 50 mg by mouth at bedtime   vitamin D3 (CHOLECALCIFEROL) 50 mcg (2000 units) tablet 11/9/2023 at am  Yes Yes   Sig: Take 1 tablet by mouth daily      Facility-Administered Medications: None     Allergies   Allergies   Allergen Reactions    Amoxicillin     Hydrochlorothiazide W-Triamterene Other (See Comments)     Hyponatremia    Sertraline Other (See Comments)     Diarrhea       Social History   I have reviewed this patient's social history and updated it with pertinent information if needed. Sundaredwin BATES Samira  reports that she has quit smoking. She has never used smokeless tobacco. She reports that she does not currently use alcohol. She reports that she does not use drugs.    Family History   I have reviewed this patient's family history and updated it with pertinent information if needed.   History reviewed. No pertinent family history.    Review of Systems   Review of systems not obtained due to patient factors - confusion    Physical Exam   Temp: 97.5  F (36.4  C) Temp src: Temporal BP: (!) 193/90 Pulse: 100   Resp:  20 SpO2: 94 % O2 Device: None (Room air)    Vital Signs with Ranges  Temp:  [97.4  F (36.3  C)-98.2  F (36.8  C)] 97.5  F (36.4  C)  Pulse:  [] 100  Resp:  [17-20] 20  BP: (142-197)/(78-97) 193/90  SpO2:  [94 %-97 %] 94 %  170 lbs 0 oz    Constitutional: awake, alert, cooperative, no apparent distress, and appears stated age  Eyes: extra-ocular muscles intact, no scleral icterus  ENT: normocepalic, atraumatic without obvious abnormality  Hematologic / Lymphatic: Well-perfused feet no lymphedema   Respiratory: no increased work of breathing, symmetric chest wall expansion  Skin: normal skin color, texture, turgor  Musculoskeletal: no lower extremity pitting edema present  full range of motion noted  tone is normal  with exception of her right lower extremity where range of motion and strength were not tested secondary to pain.  Her exam is limited secondary to her inability to cooperate/participate  Neurologic: Cranial Nerves:  cranial nerves II-XII are grossly intact  Motor Exam:  moves all extremities well and symmetrically  Sensory:  Sensory intact  Neuropsychiatric: General: normal, calm, and normal eye contact  Level of consciousness: alert / normal  Affect: normal  Orientation: oriented only to self    Data   Results for orders placed or performed during the hospital encounter of 11/10/23 (from the past 24 hour(s))   CBC with platelets differential    Narrative    The following orders were created for panel order CBC with platelets differential.  Procedure                               Abnormality         Status                     ---------                               -----------         ------                     CBC with platelets and d...[[id number]]  Abnormal            Final result                 Please view results for these tests on the individual orders.   ABO/Rh type and screen    Narrative    The following orders were created for panel order ABO/Rh type and screen.  Procedure                                Abnormality         Status                     ---------                               -----------         ------                     Adult Type and Screen[[id number]]                            Edited Result - FINAL        Please view results for these tests on the individual orders.   Basic metabolic panel   Result Value Ref Range    Sodium 132 (L) 135 - 145 mmol/L    Potassium 4.9 3.4 - 5.3 mmol/L    Chloride 97 (L) 98 - 107 mmol/L    Carbon Dioxide (CO2) 26 22 - 29 mmol/L    Anion Gap 9 7 - 15 mmol/L    Urea Nitrogen 17.0 8.0 - 23.0 mg/dL    Creatinine 0.57 0.51 - 0.95 mg/dL    GFR Estimate >90 >60 mL/min/1.73m2    Calcium 9.1 8.8 - 10.2 mg/dL    Glucose 97 70 - 99 mg/dL   CBC with platelets and differential   Result Value Ref Range    WBC Count 10.5 4.0 - 11.0 10e3/uL    RBC Count 3.91 3.80 - 5.20 10e6/uL    Hemoglobin 11.3 (L) 11.7 - 15.7 g/dL    Hematocrit 36.4 35.0 - 47.0 %    MCV 93 78 - 100 fL    MCH 28.9 26.5 - 33.0 pg    MCHC 31.0 (L) 31.5 - 36.5 g/dL    RDW 14.6 10.0 - 15.0 %    Platelet Count 338 150 - 450 10e3/uL    % Neutrophils 66 %    % Lymphocytes 17 %    % Monocytes 13 %    % Eosinophils 2 %    % Basophils 1 %    % Immature Granulocytes 1 %    NRBCs per 100 WBC 0 <1 /100    Absolute Neutrophils 7.0 1.6 - 8.3 10e3/uL    Absolute Lymphocytes 1.7 0.8 - 5.3 10e3/uL    Absolute Monocytes 1.4 (H) 0.0 - 1.3 10e3/uL    Absolute Eosinophils 0.2 0.0 - 0.7 10e3/uL    Absolute Basophils 0.1 0.0 - 0.2 10e3/uL    Absolute Immature Granulocytes 0.1 <=0.4 10e3/uL    Absolute NRBCs 0.0 10e3/uL   Adult Type and Screen   Result Value Ref Range    ABO/RH(D) O POS     Antibody Screen Negative Negative    SPECIMEN EXPIRATION DATE [id number]    CT Head w/o Contrast    Narrative    EXAM: CT HEAD W/O CONTRAST, CT CERVICAL SPINE W/O CONTRAST  LOCATION: [facility]  DATE: 11/11/2023    INDICATION: Fall, head trauma  COMPARISON: None.  TECHNIQUE:   1) Routine CT Head without IV  contrast. Multiplanar reformats. Dose reduction techniques were used.  2) Routine CT Cervical Spine without IV contrast. Multiplanar reformats. Dose reduction techniques were used.    FINDINGS:   HEAD CT:   INTRACRANIAL CONTENTS: No intracranial hemorrhage, extraaxial collection, or mass effect.  No CT evidence of acute infarct. Mild to moderate presumed chronic small vessel ischemic changes. Mild generalized volume loss. No hydrocephalus.     VISUALIZED ORBITS/SINUSES/MASTOIDS: No intraorbital abnormality. No significant paranasal sinus mucosal disease. No middle ear or mastoid effusion.    BONES/SOFT TISSUES: No acute abnormality.    CERVICAL SPINE CT:   VERTEBRA: Straightening of the usual cervical lordosis. No acute compression fracture or posttraumatic subluxation. Osseous fusion of the C3-C4 lateral masses.    CANAL/FORAMINA: Multilevel spondylosis without high grade canal stenosis.    PARASPINAL: No prevertebral edema.      Impression    IMPRESSION:  HEAD CT:  1.  No acute intracranial process.    CERVICAL SPINE CT:  1.  No acute cervical spine fracture.   CT Cervical Spine w/o Contrast    Narrative    EXAM: CT HEAD W/O CONTRAST, CT CERVICAL SPINE W/O CONTRAST  LOCATION: [facility]  DATE: 11/11/2023    INDICATION: Fall, head trauma  COMPARISON: None.  TECHNIQUE:   1) Routine CT Head without IV contrast. Multiplanar reformats. Dose reduction techniques were used.  2) Routine CT Cervical Spine without IV contrast. Multiplanar reformats. Dose reduction techniques were used.    FINDINGS:   HEAD CT:   INTRACRANIAL CONTENTS: No intracranial hemorrhage, extraaxial collection, or mass effect.  No CT evidence of acute infarct. Mild to moderate presumed chronic small vessel ischemic changes. Mild generalized volume loss. No hydrocephalus.     VISUALIZED ORBITS/SINUSES/MASTOIDS: No intraorbital abnormality. No significant paranasal sinus mucosal disease. No middle ear or mastoid  effusion.    BONES/SOFT TISSUES: No acute abnormality.    CERVICAL SPINE CT:   VERTEBRA: Straightening of the usual cervical lordosis. No acute compression fracture or posttraumatic subluxation. Osseous fusion of the C3-C4 lateral masses.    CANAL/FORAMINA: Multilevel spondylosis without high grade canal stenosis.    PARASPINAL: No prevertebral edema.      Impression    IMPRESSION:  HEAD CT:  1.  No acute intracranial process.    CERVICAL SPINE CT:  1.  No acute cervical spine fracture.   CT Chest/Abdomen/Pelvis w Contrast    Narrative    EXAM: CT CHEST/ABDOMEN/PELVIS W CONTRAST  LOCATION: [facility]  DATE: 11/11/2023    INDICATION: fall, diffuse chest tenderness. right sided abdominal pain and hip pain  COMPARISON: None.  TECHNIQUE: CT scan of the chest, abdomen, and pelvis was performed following injection of IV contrast. Multiplanar reformats were obtained. Dose reduction techniques were used.   CONTRAST: 60mL Isovue 370    FINDINGS:   LUNGS AND PLEURA: Negative, no pleural effusion or pneumothorax.    MEDIASTINUM/AXILLAE: Normal.    CORONARY ARTERY CALCIFICATION: Mild.    HEPATOBILIARY: Tiny hepatic cysts. No traumatic lesion evident.    PANCREAS: Normal.    SPLEEN: Normal.    ADRENAL GLANDS: Fullness of both adrenals suggesting small bilateral benign adenomas.    KIDNEYS/BLADDER: Normal.    BOWEL: No obstruction or inflammatory change. No ascites.    LYMPH NODES: Normal.    VASCULATURE: Moderate atherosclerotic plaque.    PELVIC ORGANS: Normal.    MUSCULOSKELETAL: Prior left hip ORIF. Spinal fusion.  Acute intertrochanteric fracture right hip.      Impression    IMPRESSION:  1.  Acute appearing right intertrochanteric hip fracture.  2.   No other acute/traumatic abnormality identified.   XR Elbow Right 2 Views    Narrative    EXAM: XR ELBOW RIGHT 2 VIEWS  LOCATION: [facility]  DATE: 11/11/2023    INDICATION: fall, tenderness to the lateral elbow  COMPARISON: None.       Impression    IMPRESSION: Normal joint spaces and alignment. No fracture or joint effusion.   XR Femur Right 2 Views    Narrative    EXAM: XR FEMUR RIGHT 2 VIEWS  LOCATION: [facility]  DATE: 11/11/2023    INDICATION: Right lower extremity pain.  COMPARISON: X-ray pelvis single view 10/10/2020 at 1206 hours.      Impression    IMPRESSION: Interval development of acute comminuted proximal right femoral fracture, with varus deformity. Right femoral head is located in the acetabulum. Degenerative changes right hip and knee joint.   EKG 12-lead, tracing only   Result Value Ref Range    Systolic Blood Pressure  mmHg    Diastolic Blood Pressure  mmHg    Ventricular Rate 93 BPM    Atrial Rate 93 BPM    WA Interval 136 ms    QRS Duration 92 ms     ms    QTc 447 ms    P Axis 73 degrees    R AXIS 68 degrees    T Axis 75 degrees    Interpretation ECG       Sinus rhythm  Cannot rule out Anterior infarct , age undetermined  Abnormal ECG  When compared with ECG of 10-OCT-2020 11:50,  No significant change was found     ABO/Rh type and screen *Canceled*    Narrative    The following orders were created for panel order ABO/Rh type and screen.  Procedure                               Abnormality         Status                     ---------                               -----------         ------                       Please view results for these tests on the individual orders.   CBC with Platelets and Differential    Narrative    The following orders were created for panel order CBC with Platelets and Differential.  Procedure                               Abnormality         Status                     ---------                               -----------         ------                     CBC with platelets and d...[[id number]]                      Final result                 Please view results for these tests on the individual orders.   INR   Result Value Ref Range    INR 0.92 0.85 - 1.15   Basic  metabolic panel   Result Value Ref Range    Sodium 136 135 - 145 mmol/L    Potassium 3.9 3.4 - 5.3 mmol/L    Chloride 99 98 - 107 mmol/L    Carbon Dioxide (CO2) 29 22 - 29 mmol/L    Anion Gap 8 7 - 15 mmol/L    Urea Nitrogen 17.3 8.0 - 23.0 mg/dL    Creatinine 0.55 0.51 - 0.95 mg/dL    GFR Estimate >90 >60 mL/min/1.73m2    Calcium 8.9 8.8 - 10.2 mg/dL    Glucose 106 (H) 70 - 99 mg/dL   Partial thromboplastin time   Result Value Ref Range    aPTT 30 22 - 38 Seconds   CBC with platelets and differential   Result Value Ref Range    WBC Count 11.0 4.0 - 11.0 10e3/uL    RBC Count 4.06 3.80 - 5.20 10e6/uL    Hemoglobin 11.7 11.7 - 15.7 g/dL    Hematocrit 37.0 35.0 - 47.0 %    MCV 91 78 - 100 fL    MCH 28.8 26.5 - 33.0 pg    MCHC 31.6 31.5 - 36.5 g/dL    RDW 14.4 10.0 - 15.0 %    Platelet Count 367 150 - 450 10e3/uL    % Neutrophils 69 %    % Lymphocytes 16 %    % Monocytes 11 %    % Eosinophils 2 %    % Basophils 1 %    % Immature Granulocytes 1 %    NRBCs per 100 WBC 0 <1 /100    Absolute Neutrophils 7.7 1.6 - 8.3 10e3/uL    Absolute Lymphocytes 1.8 0.8 - 5.3 10e3/uL    Absolute Monocytes 1.2 0.0 - 1.3 10e3/uL    Absolute Eosinophils 0.2 0.0 - 0.7 10e3/uL    Absolute Basophils 0.1 0.0 - 0.2 10e3/uL    Absolute Immature Granulocytes 0.1 <=0.4 10e3/uL    Absolute NRBCs 0.0 10e3/uL

## 2023-11-11 NOTE — PHARMACY-ADMISSION MEDICATION HISTORY
Pharmacist Admission Medication History    Admission medication history is complete. The information provided in this note is only as accurate as the sources available at the time of the update.    Information Source(s): Facility (U/NH/) medication list/MAR via N/A    Pertinent Information:      Changes made to PTA medication list:  Added: Duloxetine, sinemet, lorazepam, gabapentin, divalproex, trazodone, aquaphor, Vit D, maalox, calmoseptine ointment, Prep H  Deleted: ibuprofen, losartan, mexiletine, ondansetron, oxycodone, oxybutynin-ER  Changed: Vit D, senokot-s    Medication Affordability:  Not including over the counter (OTC) medications, was there a time in the past 3 months when you did not take your medications as prescribed because of cost?: Unable to Assess    Allergies reviewed with patient and updates made in EHR: unable to assess    Medication History Completed By: Emily Rodriguez [facility] 11/11/2023 11:37 AM    PTA Med List   Medication Sig Last Dose    acetaminophen (TYLENOL) 500 MG tablet Take 500 mg by mouth 3 times daily 730am, 2pm, 630pm 11/9/2023 at pm    alum & mag hydroxide-simethicone (MAALOX) 200-200-20 MG/5ML SUSP suspension Take 30 mLs by mouth daily as needed for indigestion     bisacodyl (DULCOLAX) 10 MG suppository Place 10 mg rectally daily as needed for constipation     carbidopa-levodopa (SINEMET)  MG tablet Take 2 tablets by mouth 3 times daily Times: 0730, 1130, 1630 11/9/2023 at pm    divalproex sodium delayed-release (DEPAKOTE) 125 MG DR tablet Take 125 mg by mouth 2 times daily Mood disorder 11/9/2023 at pm    DULoxetine (CYMBALTA) 60 MG capsule Take 60 mg by mouth daily 11/9/2023 at am    gabapentin (NEURONTIN) 100 MG capsule Take 100 mg by mouth 3 times daily 8am, 2pm, 8 pm 11/9/2023 at pm    LORazepam (ATIVAN) 0.5 MG tablet Take 0.5 mg by mouth at bedtime 11/9/2023    LORazepam (ATIVAN) 0.5 MG tablet Take 0.5 mg by mouth daily as needed for anxiety     menthol-zinc oxide  (CALMOSEPTINE) 0.44-20.6 % OINT ointment Apply topically 3 times daily as needed for skin protection     mineral oil-hydrophilic petrolatum (AQUAPHOR) external ointment Apply topically 2 times daily To lower facial rash 11/9/2023 at pm    polyethylene glycol (MIRALAX) 17 g packet Take 17 g by mouth daily as needed for constipation     pramox-pe-glycerin-petrolatum (PREPARATION H) 1-0.25-14.4-15 % CREA cream Place rectally 2 times daily as needed for hemorrhoids     predniSONE (DELTASONE) 5 MG tablet Take 25 mg by mouth twice a week on Saturday and Kenneth 11/10/2023 at am    senna-docusate (SENOKOT-S/PERICOLACE) 8.6-50 MG tablet Take 2 tablets by mouth 2 times daily as needed for constipation     traZODone (DESYREL) 50 MG tablet Take 50 mg by mouth at bedtime 11/9/2023 at hs    vitamin D3 (CHOLECALCIFEROL) 50 mcg (2000 units) tablet Take 1 tablet by mouth daily 11/9/2023 at am

## 2023-11-11 NOTE — PROGRESS NOTES
Pt seen and evaluated. H&P reviewed from Dr. Jo. Called patient's .    Patient is clearly confused.  She lives in memory care.  She does not seem to be in significant pain.   notes she has fairly advanced dementia.    Vitals:    11/11/23 0245 11/11/23 0350 11/11/23 0737 11/11/23 0921   BP: (!) 142/87 (!) 184/85 (!) 194/83 (!) (P) 197/78   BP Location: Right arm Left arm Left arm (P) Left arm   Pulse: 93 100     Resp: 18 17 20    Temp: 97.9  F (36.6  C) 97.4  F (36.3  C) 97.5  F (36.4  C)    TempSrc: Oral Temporal Temporal    SpO2: 94% 96% 94%      On exam, she is answering simple yes/no questions but disoriented to time/situation/place.  Does not seem to be in significant pain. Nursing aid at bedside holding her hand and doing good job keeping her calm.  Heart is regular. Lungs clear. Ext WWP.     Plan:  -Per H&P, ortho consulted. Plan for OR today.  -Ordered prn hydralazine and labetalol for significantly high BP's.   -She is at very high risk of post-op delirium. Will order low dose prn haldol if needed to keep her safe. Bed/chair alarms. Fall precautions.  -Reviewed med list from facility, resumed home depakote, duloxetine, gabapentin, cinamet, lorazepam, trazodone  -Confirmed with patient's  she is DNR/DNI which is also consistent with facility paperwork.  He confirms no heroics.     Gurjit Tillman MD

## 2023-11-11 NOTE — ANESTHESIA PROCEDURE NOTES
Airway       Patient location during procedure: OR       Procedure Start/Stop Times: 11/11/2023 1:18 PM  Staff -        CRNA: Nella Burgess APRN CRNA       Performed By: CRNA  Consent for Airway        Urgency: elective  Indications and Patient Condition       Indications for airway management: zachary-procedural       Induction type:intravenous       Mask difficulty assessment: 0 - not attempted    Final Airway Details       Final airway type: endotracheal airway       Successful airway: ETT - single  Endotracheal Airway Details        ETT size (mm): 7.0       Cuffed: yes       Successful intubation technique: direct laryngoscopy       DL Blade Type: Squires 2       Grade View of Cords: 1       Adjucts: stylet       Position: Right       Measured from: gums/teeth       Secured at (cm): 20       Bite block used: None    Post intubation assessment        Placement verified by: capnometry, equal breath sounds and chest rise        Number of attempts at approach: 1       Number of other approaches attempted: 0       Secured with: plastic tape       Ease of procedure: easy       Dentition: Unchanged    Medication(s) Administered   Medication Administration Time: 11/11/2023 1:18 PM

## 2023-11-11 NOTE — OP NOTE
[facility]  Orthopedic Operative Note    Short Cephallomedullary Nailing    David Hogan MRN# [id number]   YOB: 1945  Procedure Date:  11/11/2023  Age: 78 year old     PREOPERATIVE DIAGNOSIS:  right intertrochanteric femur fracture.    POSTOPERATIVE DIAGNOSIS:  right intertrochanteric femur fracture .     PROCEDURE PERFORMED:  Surgical treatment of right intertrochanteric femur fracture with cephalomedullary device    SURGEON:  Chin Kemp MD    FIRST ASSISTANT: Bc Cabrales PA-C, whose assistance was required for positioning, prep and drape, instrumentation, and closure    ANESTHESIA:  General     EBL: 50 mL    COMPLICATIONS: None     DISPOSITION: Post Anesthesia Care Unit    CONDITION: Stable     INDICATIONS:  David Hogan is a 78 year old female presenting with a right intertrochanteric fracture.  Discussed both operative and nonoperative management. Risks of surgery discussed included but not limited to bleeding, infection, damage to surrounding neurovascular structures, leg length inequality, nonunion, malunion, need for revision surgery, blood clots, pulmonary embolus, and the medical risks of anesthesia.  Benefits of surgery discussed included improved chance of union in acceptable alignment, improve function, and reduction in medical risks of hip fractures.  Alternatives discussed included nonoperative management which I did not recommend.  The patient's  acknowledges risks and wishes to proceed. The informed consent was signed and documented.  I met with the patient preoperatively and marked the operative extremity.      IMPLANTS:  Implant Name Type Inv. Item Serial No.  Lot No. LRB No. Used Action   NAIL TROCHANTERIC GAMMA [id number] [id number] [id number] - [id number] Metallic Hardware/[employer] NAIL TROCHANTERIC GAMMA [id number] 125DEG [id number]  [employer] [id number] Right 1 Implanted   SCREW BONE LAG GAMMA [id number] [id number]  - [id number] Metallic Hardware/[place] SCREW BONE LAG GAMMA [id number] [id number]  [employer] [id number] Right 1 Implanted   IMP SCR STRK LOCK 5.0X37.5MM FT [id number] - [id number] Metallic Hardware/[place] IMP SCR STRK LOCK 5.0X37.5MM FT [id number]  [employer] [facility] [id number] Right 1 Implanted       PROCEDURE: The patient was brought to the OR and underwent general anesthesia.  The patient was subsequently transferred in a supine position to the fracture table.  The peroneal post was placed.  The perineum was engaged gently into the perineal post.  Both feet were placed in well-padded traction boots.  Patient placed in a scissored position with the fractured side and in-line traction and the well leg placed in approximately 30 degrees of hip extension.  Fluoroscopic unit was brought into the field and provisional reduction was achieved.  The right hip was prepped and draped in normal sterile fashion.  Antibiotic administration was confirmed and timeout was completed.  Following generalized agreement, the starting guidepin was advanced down to the start point on the greater trochanter.  We confirmed the start point on fluoroscopy and then advanced the guidewire into the femur.  The position of the guidewire was confirmed with fluoroscopy.  We then made an incision around the pin and gained access to the intramedullary nail using the entry reamer.  All instruments were removed.  A Kingsley 125 degree x 200 cm x 11 mm nail was selected.  This was advanced to an appropriate depth, confirmed by fluoroscopy.  We adjusted the neck reduction a bit with a bone hook and manual manipulation.  We then advanced the guide for the lag screw down to bone through a small lateral thigh incision.   The guidewire was then advanced into the central position within the femoral neck and head confirmed by fluoroscopy.  We then measured for and selected an appropriate length lag screw.  We reamed for the lag screw and then placed a lag  screw by hand.  The setscrew was advanced and tightened.  The guide for the interlocking screw was advanced through the jig down to bone through a small lateral thigh incision.  We then drilled for and placed one statically interlocked distal interlocking screw.  Good bicortical purchase was achieved.  All instruments were then removed.  Final fluoroscopic imaging was obtained demonstrating good alignment of the fracture good positioning of all hardware.  We then thoroughly irrigated and closed in layers with 2-0 Vicryl, and staples.  The wounds were anesthetized with bupivacaine and sterile dressings were applied.  Patient was transported to the recovery room in stable condition, sustaining no complications. There were no complications and the patient tolerated well.    PLAN:    Activity: Weight-bear as tolerated, range of motion as tolerated, physical therapy and occupational therapy consults  Antibiotics: 24 hours IV antibiotics per standard postop protocol  DVT:  SCD's and chemical prophylaxis with Lovenox in the hospital followed by ASA 81 mg PO BID or 325 mg PO daily if no contraindications  Discharge:  Case management social work consult for placement  Follow-up:  Follow up at [facility] in 2 weeks with repeat x-rays AP and lateral right hip and femur    Chin Kemp MD  [facility]

## 2023-11-11 NOTE — PLAN OF CARE
Pt is alert to self with confusion. Pt was given scheduled Tylenol. VSS. Pt is on bedrest. Pt is NPO except medication. Pt has pure wick in place. Plan for Orthopedic consult in the morning. Surgery in the morning. Bed bath done and clean gown applied. Pt is sleeping in bed. Bed alarm in place and call light within each. Will continue to monitor and assess pt.

## 2023-11-11 NOTE — PROGRESS NOTES
Pt disorientated to place, time, and situation. Calling out for help when someone is not in room. IV ativan given @ 1030,  now @ bedside. Pt pleasant. BP elevated, systolic running in the 180s-190s. MD aware, holding PRN IV BP medications as pt going to surgery, verified this with Pre Op. IV dilaudid given x1 for pain. CMS intact. Bladder scanned for 98 ml. NPO    Pt went to pre op @ 1145. Report given to pre op nurse.

## 2023-11-11 NOTE — H&P
[facility]    Hospitalist History and Physical    Name: David Hogan    MRN: [id number]  YOB: 1945    Age: 78 year old  Date of Admission:  11/10/2023  Date of Service (when I saw the patient): 11/11/23    Assessment & Plan   David Hogan is a 78 year old female with past medical history significant for dementia lives in memory care, history of osteoporosis, osteoarthritis, hypertension, hyponatremia, prior tobacco use, chronic back pain due to lumbar stenosis presented to the emergency room after a fall from standing height with acute right-sided intertrochanteric femur fracture.      S/p fall  Rt sided intertrochanteric fracture  Osteoporosis  History of prior left intertrochanteric fracture in 2020  -Trauma evaluation per emergency room no other acute fractures  -Complains of pain in right elbow  -Fall after attempting to transfer from a seated position on her wheelchair to pivot to use bathroom.  Patient at baseline requires assistance.  -Orthopedic consult  -N.p.o. after midnight  -Will likely need surgical repair  -Continue supportive cares and pain control  -Underlying osteoporosis continue vitamin D supplementation and  treatment of osteoporosis per primary care provider  -We will check coags type and screen preop      Underlying neuromuscular disorder.  -underlying non-nectotic myopathy, as well as evidence of chronic quadriceps denervation, likely due to her underlying chronic L3 or L4 radiculopathies. She has gait instability and rigidity consistent with Parkinson disease   -Patient is following up with Dr. Cagle [facility] [place] neurology for the working diagnosis of myotonic dystrophy.  Currently she is on prednisone 25 mg twice a week as well as Mexitil for this diagnosis.    -Follow-up with neurology as before  -Per last neurology note patient should be on Sinemet.  Do not see that on her active list.  We will need to review reorder meds once reconciled in  a.m.    Mild hyponatremia  -At baseline runs from 129-1 32  -Stable    H/0 Hypertension  -Pressure medication losartan as needed  -Historically had orthostatic hypotension as well    Misc: To review and reorder meds once reconciled in a.m.    Clinically Significant Risk Factors Present on Admission                  # Hypertension: Home medication list includes antihypertensive(s)                     DVT Prophylaxis: Pneumatic Compression Devices  Code Status: Full Code    Disposition: Admitted as inpatient    Primary Care Physician   Jessica Thompson    Chief Complaint   Status post fall at [facility]    Unable to obtain a history from the patient due to dementia.  History obtained from records and ED provider    History of Present Illness   David Hogan is a 78 year old female with past medical history significant for dementia lives in [facility], history of osteoporosis, osteoarthritis, hypertension, hyponatremia, prior tobacco use, chronic back pain due to lumbar stenosis presented to the emergency room after a fall from standing height with acute right-sided intertrochanteric femur fracture.      Patient took her wheelchair to bathroom and was trying to pivot and transport herself and fell.  This was unwitnessed fall.  She was found on the floor at [facility].  She is supposed to get help and ask for help for transfers but she did not this time.  After fall she had significant pain in right hip and right elbow.  She was brought to the emergency room.  Evaluation in the emergency room showed acute right hip fracture.    In the emergency room patient underwent trauma evaluation with no other acute fractures.  Patient received pain meds and is being admitted for further evaluation and treatment.  More than 10 point review of systems was attempted but was limited due to patient's underlying dementia.  Complaint of pain in right elbow and back.  Review of system is otherwise negative.      Past Medical  History    Past Medical History:   Diagnosis Date    Hypertension          Past Surgical History   Past Surgical History:   Procedure Laterality Date    BACK SURGERY      2019    BIOPSY MUSCLE DIAGNOSTIC (LOCATION) Left 9/10/2020    Procedure: LEFT QUAD MUSCLE BIOPSY;  Surgeon: Yazan Chanel MD;  Location: [facility] OR    OPEN REDUCTION INTERNAL FIXATION RODDING INTRAMEDULLAR FEMUR FRACTURE TABLE Left 10/11/2020    Procedure: Open reduction internal fixation left intertrochanteric femur fracture with an intramedullary nail;  Surgeon: Huan Feliciano MD;  Location:  OR       Prior to Admission Medications   Prior to Admission Medications   Prescriptions Last Dose Informant Patient Reported? Taking?   Vitamin D, Cholecalciferol, 25 MCG (1000 UT) TABS   Yes No   Sig: Take 2 tablets by mouth daily   acetaminophen (TYLENOL) 500 MG tablet   Yes No   Sig: Take 500 mg by mouth 3 times daily    ibuprofen (ADVIL/MOTRIN) 200 MG capsule   No No   Sig: Take 1 capsule (200 mg) by mouth every 8 hours as needed for fever   losartan (COZAAR) 25 MG tablet   Yes No   Sig: Take 12.5 mg by mouth daily as needed (for BP > 160/90)   mexiletine (MEXITIL) 150 MG capsule   Yes No   Sig: Take 150 mg by mouth 2 times daily   ondansetron (ZOFRAN-ODT) 4 MG ODT tab   No No   Sig: Take 1 tablet (4 mg) by mouth every 6 hours as needed for nausea or vomiting   order for DME   No No   Sig: Equipment being ordered: Walker Wheels () and Walker ()  Treatment Diagnosis: Impaired functional mobility   oxyCODONE (ROXICODONE) 5 MG tablet   No No   Sig: Take 1 tablet (5 mg) by mouth every 4 hours as needed for moderate to severe pain   oxybutynin ER (DITROPAN-XL) 5 MG 24 hr tablet   Yes No   Sig: Take 5 mg by mouth At Bedtime   polyethylene glycol (MIRALAX) 17 g packet   No No   Sig: Take 17 g by mouth daily as needed for constipation   predniSONE (DELTASONE) 5 MG tablet   Yes No   Sig: Take 25 mg by mouth twice a week on Saturday and Sunday    senna-docusate (SENOKOT-S/PERICOLACE) 8.6-50 MG tablet   No No   Sig: Take 1 tablet by mouth 2 times daily as needed for constipation      Facility-Administered Medications: None     Allergies   Allergies   Allergen Reactions    Amoxicillin     Hydrochlorothiazide W-Triamterene Other (See Comments)     Hyponatremia    Sertraline Other (See Comments)     Diarrhea       Social History   Social History     Tobacco Use    Smoking status: Former    Smokeless tobacco: Never   Substance Use Topics    Alcohol use: Not Currently     Social History     Social History Narrative    Not on file     Non-smoker no use of alcohol    Family History   I have reviewed this patient's family history and updated it with pertinent information if needed.   No family history on file.      Review of Systems   A comprehensive more than 10 point review of system was carried out but is limited due to patient's underlying dementia.  Symptoms as noted above.  Otherwise negative for contributory information.    Physical Exam   Temp: 98.2  F (36.8  C) Temp src: Oral BP: (!) 163/97 Pulse: 96   Resp: 20 SpO2: 97 % O2 Device: None (Room air)    Vital Signs with Ranges  Temp:  [98.2  F (36.8  C)] 98.2  F (36.8  C)  Pulse:  [96] 96  Resp:  [20] 20  BP: (163)/(97) 163/97  SpO2:  [97 %] 97 %  0 lbs 0 oz    GEN: Sleepy oriented to self, no overt distress  HEENT:  Normocephalic/atraumatic, no scleral icterus, no nasal discharge, mouth dry  CV:  Regular rate and rhythm, no murmur or JVD.  S1 + S2 noted, no S3 or S4.  LUNGS:  Clear to auscultation bilaterally without rales/rhonchi/wheezing/retractions.  Symmetric chest rise on inhalation noted.  ABD:  Active bowel sounds, soft, non-tender/non-distended.  No rebound/guarding/rigidity.  EXT:  No edema.  RT Lower extremity externally rotated and shortened   SKIN:  Dry to touch, no exanthems noted in the visualized areas.  NEURO: Patient with underlying dementia.  Cranial nerves intact.  Not following commands.  " Somewhat sleepy.  Moving upper extremity.  Not moving lower extremity..   Data   Data reviewed today:  I personally reviewed the EKG tracing showing this rhythm with poor R wave progression not new when compared to prior EKG.  Nonspecific ST-T changes .    Recent Labs   Lab 11/10/23  2344   WBC 10.5   HGB 11.3*   HCT 36.4   MCV 93        Recent Labs   Lab 11/10/23  2344   *   POTASSIUM 4.9   CHLORIDE 97*   CO2 26   ANIONGAP 9   GLC 97   BUN 17.0   CR 0.57   GFRESTIMATED >90   ANN-MARIE 9.1     7-Day Micro Results       No results found for the last 168 hours.          No results for input(s): \"AST\", \"ALT\", \"GGT\", \"ALKPHOS\", \"BILITOTAL\", \"BILICONJ\", \"BILIDIRECT\", \"ADAMA\" in the last 168 hours.    Invalid input(s): \"BILIRUBININDIRECT\"  No results for input(s): \"INR\" in the last 168 hours.  No results for input(s): \"LACT\" in the last 168 hours.  No results for input(s): \"LIPASE\" in the last 168 hours.  No results for input(s): \"TSH\" in the last 168 hours.  No results for input(s): \"COLOR\", \"APPEARANCE\", \"URINEGLC\", \"URINEBILI\", \"URINEKETONE\", \"SG\", \"UBLD\", \"URINEPH\", \"PROTEIN\", \"UROBILINOGEN\", \"NITRITE\", \"LEUKEST\", \"RBCU\", \"WBCU\" in the last 168 hours.    Recent Results (from the past 24 hour(s))   CT Head w/o Contrast    Narrative    EXAM: CT HEAD W/O CONTRAST, CT CERVICAL SPINE W/O CONTRAST  LOCATION: [facility]  DATE: 11/11/2023    INDICATION: Fall, head trauma  COMPARISON: None.  TECHNIQUE:   1) Routine CT Head without IV contrast. Multiplanar reformats. Dose reduction techniques were used.  2) Routine CT Cervical Spine without IV contrast. Multiplanar reformats. Dose reduction techniques were used.    FINDINGS:   HEAD CT:   INTRACRANIAL CONTENTS: No intracranial hemorrhage, extraaxial collection, or mass effect.  No CT evidence of acute infarct. Mild to moderate presumed chronic small vessel ischemic changes. Mild generalized volume loss. No hydrocephalus.     VISUALIZED " ORBITS/SINUSES/MASTOIDS: No intraorbital abnormality. No significant paranasal sinus mucosal disease. No middle ear or mastoid effusion.    BONES/SOFT TISSUES: No acute abnormality.    CERVICAL SPINE CT:   VERTEBRA: Straightening of the usual cervical lordosis. No acute compression fracture or posttraumatic subluxation. Osseous fusion of the C3-C4 lateral masses.    CANAL/FORAMINA: Multilevel spondylosis without high grade canal stenosis.    PARASPINAL: No prevertebral edema.      Impression    IMPRESSION:  HEAD CT:  1.  No acute intracranial process.    CERVICAL SPINE CT:  1.  No acute cervical spine fracture.   CT Cervical Spine w/o Contrast    Narrative    EXAM: CT HEAD W/O CONTRAST, CT CERVICAL SPINE W/O CONTRAST  LOCATION: [facility]  DATE: 11/11/2023    INDICATION: Fall, head trauma  COMPARISON: None.  TECHNIQUE:   1) Routine CT Head without IV contrast. Multiplanar reformats. Dose reduction techniques were used.  2) Routine CT Cervical Spine without IV contrast. Multiplanar reformats. Dose reduction techniques were used.    FINDINGS:   HEAD CT:   INTRACRANIAL CONTENTS: No intracranial hemorrhage, extraaxial collection, or mass effect.  No CT evidence of acute infarct. Mild to moderate presumed chronic small vessel ischemic changes. Mild generalized volume loss. No hydrocephalus.     VISUALIZED ORBITS/SINUSES/MASTOIDS: No intraorbital abnormality. No significant paranasal sinus mucosal disease. No middle ear or mastoid effusion.    BONES/SOFT TISSUES: No acute abnormality.    CERVICAL SPINE CT:   VERTEBRA: Straightening of the usual cervical lordosis. No acute compression fracture or posttraumatic subluxation. Osseous fusion of the C3-C4 lateral masses.    CANAL/FORAMINA: Multilevel spondylosis without high grade canal stenosis.    PARASPINAL: No prevertebral edema.      Impression    IMPRESSION:  HEAD CT:  1.  No acute intracranial process.    CERVICAL SPINE CT:  1.  No acute cervical  spine fracture.   CT Chest/Abdomen/Pelvis w Contrast    Narrative    EXAM: CT CHEST/ABDOMEN/PELVIS W CONTRAST  LOCATION: [facility]  DATE: 11/11/2023    INDICATION: fall, diffuse chest tenderness. right sided abdominal pain and hip pain  COMPARISON: None.  TECHNIQUE: CT scan of the chest, abdomen, and pelvis was performed following injection of IV contrast. Multiplanar reformats were obtained. Dose reduction techniques were used.   CONTRAST: 60mL Isovue 370    FINDINGS:   LUNGS AND PLEURA: Negative, no pleural effusion or pneumothorax.    MEDIASTINUM/AXILLAE: Normal.    CORONARY ARTERY CALCIFICATION: Mild.    HEPATOBILIARY: Tiny hepatic cysts. No traumatic lesion evident.    PANCREAS: Normal.    SPLEEN: Normal.    ADRENAL GLANDS: Fullness of both adrenals suggesting small bilateral benign adenomas.    KIDNEYS/BLADDER: Normal.    BOWEL: No obstruction or inflammatory change. No ascites.    LYMPH NODES: Normal.    VASCULATURE: Moderate atherosclerotic plaque.    PELVIC ORGANS: Normal.    MUSCULOSKELETAL: Prior left hip ORIF. Spinal fusion.  Acute intertrochanteric fracture right hip.      Impression    IMPRESSION:  1.  Acute appearing right intertrochanteric hip fracture.  2.   No other acute/traumatic abnormality identified.   XR Elbow Right 2 Views    Narrative    EXAM: XR ELBOW RIGHT 2 VIEWS  LOCATION: [facility]  DATE: 11/11/2023    INDICATION: fall, tenderness to the lateral elbow  COMPARISON: None.      Impression    IMPRESSION: Normal joint spaces and alignment. No fracture or joint effusion.   XR Femur Right 2 Views    Narrative    EXAM: XR FEMUR RIGHT 2 VIEWS  LOCATION: [facility]  DATE: 11/11/2023    INDICATION: Right lower extremity pain.  COMPARISON: X-ray pelvis single view 10/10/2020 at 1206 hours.      Impression    IMPRESSION: Interval development of acute comminuted proximal right femoral fracture, with varus deformity. Right femoral  head is located in the acetabulum. Degenerative changes right hip and knee joint.

## 2023-11-11 NOTE — ANESTHESIA CARE TRANSFER NOTE
Patient: David Hogan    Procedure: Procedure(s):  OPEN REDUCTION INTERNAL FIXATION, FRACTURE, FEMUR, USING INTRAMEDULLARY DAMION AND FRACTURE TABLE       Diagnosis: Closed displaced intertrochanteric fracture of right femur, initial encounter (H) [S72.141A]  Diagnosis Additional Information: No value filed.    Anesthesia Type:   General     Note:    Oropharynx: oral airway in place  Level of Consciousness: drowsy  Oxygen Supplementation: face mask  Level of Supplemental Oxygen (L/min / FiO2): 6  Independent Airway: airway patency satisfactory and stable  Dentition: dentition unchanged  Vital Signs Stable: post-procedure vital signs reviewed and stable  Report to RN Given: handoff report given  Patient transferred to: PACU    Handoff Report: Identifed the Patient, Identified the Reponsible Provider, Reviewed the pertinent medical history, Discussed the surgical course, Reviewed Intra-OP anesthesia mangement and issues during anesthesia, Set expectations for post-procedure period and Allowed opportunity for questions and acknowledgement of understanding      Vitals:  Vitals Value Taken Time   /80 11/11/23 1436   Temp     Pulse 92 11/11/23 1439   Resp 15 11/11/23 1439   SpO2 100 % 11/11/23 1439   Vitals shown include unfiled device data.    Electronically Signed By: JORGE Pimentel CRNA  November 11, 2023  2:40 PM

## 2023-11-11 NOTE — ANESTHESIA POSTPROCEDURE EVALUATION
Patient: David Hogan    Procedure: Procedure(s):  OPEN REDUCTION INTERNAL FIXATION, FRACTURE, FEMUR, USING INTRAMEDULLARY DAMION AND FRACTURE TABLE       Anesthesia Type:  General    Note:  Disposition: Inpatient   Postop Pain Control: Uneventful            Sign Out: Well controlled pain   PONV: No   Neuro/Psych: Uneventful            Sign Out: Acceptable/Baseline neuro status   Airway/Respiratory: Uneventful            Sign Out: Acceptable/Baseline resp. status   CV/Hemodynamics: Uneventful            Sign Out: Acceptable CV status; No obvious hypovolemia; No obvious fluid overload   Other NRE: NONE   DID A NON-ROUTINE EVENT OCCUR? No           Last vitals:  Vitals Value Taken Time   /75 11/11/23 1440   Temp     Pulse 91 11/11/23 1447   Resp 18 11/11/23 1447   SpO2 97 % 11/11/23 1447   Vitals shown include unfiled device data.    Electronically Signed By: Marsha Gonzalez MD  November 11, 2023  2:48 PM

## 2023-11-11 NOTE — ANESTHESIA PREPROCEDURE EVALUATION
Anesthesia Pre-Procedure Evaluation    Patient: David Hogan   MRN: [id number] : 1945        Procedure : Procedure(s):  OPEN REDUCTION INTERNAL FIXATION, FRACTURE, FEMUR, USING INTRAMEDULLARY DAMION AND FRACTURE TABLE          Past Medical History:   Diagnosis Date    Hypertension       Past Surgical History:   Procedure Laterality Date    BACK SURGERY      2019    BIOPSY MUSCLE DIAGNOSTIC (LOCATION) Left 9/10/2020    Procedure: LEFT QUAD MUSCLE BIOPSY;  Surgeon: Yazan Chanel MD;  Location: [facility]    OPEN REDUCTION INTERNAL FIXATION RODDING INTRAMEDULLAR FEMUR FRACTURE TABLE Left 10/11/2020    Procedure: Open reduction internal fixation left intertrochanteric femur fracture with an intramedullary nail;  Surgeon: Huan Feliciano MD;  Location: [facility] OR      Allergies   Allergen Reactions    Amoxicillin     Hydrochlorothiazide W-Triamterene Other (See Comments)     Hyponatremia    Sertraline Other (See Comments)     Diarrhea      Social History     Tobacco Use    Smoking status: Former    Smokeless tobacco: Never   Substance Use Topics    Alcohol use: Not Currently      Wt Readings from Last 1 Encounters:   09/10/20 61.4 kg (135 lb 6.4 oz)        Anesthesia Evaluation            ROS/MED HX  ENT/Pulmonary:  - neg pulmonary ROS     Neurologic: Comment: Myotonic dystrophy    (+) delerium, resolved No. dementia,                             Cardiovascular:     (+)  hypertension- -   -  - -                                      METS/Exercise Tolerance:     Hematologic:  - neg hematologic  ROS     Musculoskeletal:   (+)     fracture, Fracture location: RLE,         GI/Hepatic:  - neg GI/hepatic ROS     Renal/Genitourinary:  - neg Renal ROS     Endo:  - neg endo ROS     Psychiatric/Substance Use:  - neg psychiatric ROS     Infectious Disease:  - neg infectious disease ROS     Malignancy:  - neg malignancy ROS     Other:  - neg other ROS          Physical Exam    Airway        Mallampati: II   TM distance: > 3 FB  "  Neck ROM: full   Mouth opening: > 3 cm    Respiratory Devices and Support         Dental       (+) Edentulous      Cardiovascular   cardiovascular exam normal       Rhythm and rate: regular and normal     Pulmonary   pulmonary exam normal        breath sounds clear to auscultation           OUTSIDE LABS:  CBC:   Lab Results   Component Value Date    WBC 11.0 11/11/2023    WBC 10.5 11/10/2023    HGB 11.7 11/11/2023    HGB 11.3 (L) 11/10/2023    HCT 37.0 11/11/2023    HCT 36.4 11/10/2023     11/11/2023     11/10/2023     BMP:   Lab Results   Component Value Date     11/11/2023     (L) 11/10/2023    POTASSIUM 3.9 11/11/2023    POTASSIUM 4.9 11/10/2023    CHLORIDE 99 11/11/2023    CHLORIDE 97 (L) 11/10/2023    CO2 29 11/11/2023    CO2 26 11/10/2023    BUN 17.3 11/11/2023    BUN 17.0 11/10/2023    CR 0.55 11/11/2023    CR 0.57 11/10/2023     (H) 11/11/2023    GLC 97 11/10/2023     COAGS:   Lab Results   Component Value Date    PTT 30 11/11/2023    INR 0.92 11/11/2023     POC: No results found for: \"BGM\", \"HCG\", \"HCGS\"  HEPATIC:   Lab Results   Component Value Date    ALBUMIN 3.4 (L) 06/15/2022    PROTTOTAL 7.2 06/15/2022    ALT <9 06/15/2022    AST 15 06/15/2022    ALKPHOS 94 06/15/2022    BILITOTAL 0.3 06/15/2022     OTHER:   Lab Results   Component Value Date    ANN-MARIE 8.9 11/11/2023    TSH 1.16 07/17/2019    SED 12 08/11/2020       Anesthesia Plan    ASA Status:  3, emergent    NPO Status:  ELEVATED Aspiration Risk/Unknown    Anesthesia Type: General.     - Airway: ETT   Induction: Intravenous.   Maintenance: Inhalation.        Consents    Anesthesia Plan(s) and associated risks, benefits, and realistic alternatives discussed. Questions answered and patient/representative(s) expressed understanding.     - Discussed: Risks, Benefits and Alternatives for the PROCEDURE were discussed     - Discussed with:  Patient, Healthcare Power of        Use of blood products discussed: Yes.    "  - Discussed with: Patient, Healthcare Power of .     - Consented: consented to blood products            Reason for refusal: other.     Postoperative Care    Pain management: IV analgesics, Oral pain medications.   PONV prophylaxis: Ondansetron (or other 5HT-3), Dexamethasone or Solumedrol     Comments:    Other Comments: 78 year old patient with dementia and myotonic dystrophy s/p mechanical fall c/b right femoral neck fracture. Myotonic dystrophy considerations: Avoid succinylcholine and neostigmine. Maintain normothermia.    Code status: DNR/DNI, will reverse DNI for procedure and revert back to to DNR/DNI after procedure.    Plan: GETA. RSI with rocuronium (avoid sux) given risk of aspiration with myotonic dystrophy. Reverse with sugammadex (avoid neostigmine).     Pain control: fentanyl on induction, dilaudid for maintenance.                  Marsha Gonzalez MD

## 2023-11-11 NOTE — ED PROVIDER NOTES
History     Chief Complaint:  Fall       The history is provided by the patient and the spouse.      David Hogan is a 78 year old female with a history of hypertension who presents from memory care following an unwitnessed fall in her bathroom with head pain, neck pain, and right hip pain. She also reports abdominal pain and right elbow pain, which her  reports are both chronic concerns.    Independent Historian:   Patient's  reports she wheeled herself into the bathroom today prior to the fall. He says she had another fall yesterday and was complaining of tailbone pain thereafter.    Medications:    Losartan  Mexitil  Sinemet  Lexapro  Gabapentin   Trazodone     Past Medical History:    Hypertension   Cervical myelopathy  Generalized anxiety disorder   Osteoarthritis     Past Surgical History:    Left quad muscle biopsy   Back surgery   ORIF left femur  Tonsillectomy     Physical Exam   Patient Vitals for the past 24 hrs:   BP Temp Temp src Pulse Resp SpO2   11/11/23 0350 (!) 184/85 97.4  F (36.3  C) Temporal 100 17 96 %   11/11/23 0245 (!) 142/87 97.9  F (36.6  C) Oral 93 18 94 %   11/10/23 2209 (!) 163/97 -- -- 96 20 97 %   11/10/23 2207 -- 98.2  F (36.8  C) Oral -- -- --        Physical Exam  General: Patient is alert, awake and interactive when I enter the room  Head: The scalp, face, and head appear normal. Atraumatic.   Eyes: The pupils are equal, round, and reactive to light. Conjunctivae and sclerae are normal  ENT: No hemotympanum or signs basilar skull fracture. The oropharynx is normal without erythema. No tenderness to palpation of the face, nose or jaw.   Neck: Midline cervical spine tenderness with c-collar in place.  CV: tachycardiac but regular   Resp: Lungs are clear without wheezes or rales. No distress. No crepitance.   GI: Abdomen is soft, no rigidity, guarding, or rebound. No contusion. No distension. No tenderness to palpation in any quadrant.     MS: Shortening and  external rotation of the right lower extremity concerning for possible underlying pathology.  Tenderness to palpation of the right groin overlying the hip concerning for proximal humerus fracture.  No significant pain or tenderness with passive or active range of motion of the left lower extremity or bilateral upper extremities.  There are some mild tenderness to palpation over the right elbow.  Skin: No rash or lesions noted. Normal capillary refill noted  Neuro:   Speech is normal and fluent. Face is symmetric.  Psych: Normal affect.  Appropriate interactions.    Emergency Department Course   Imaging:  XR Femur Right 2 Views   Final Result   IMPRESSION: Interval development of acute comminuted proximal right femoral fracture, with varus deformity. Right femoral head is located in the acetabulum. Degenerative changes right hip and knee joint.      XR Elbow Right 2 Views   Final Result   IMPRESSION: Normal joint spaces and alignment. No fracture or joint effusion.      CT Chest/Abdomen/Pelvis w Contrast   Final Result   IMPRESSION:   1.  Acute appearing right intertrochanteric hip fracture.   2.   No other acute/traumatic abnormality identified.      CT Cervical Spine w/o Contrast   Final Result   IMPRESSION:   HEAD CT:   1.  No acute intracranial process.      CERVICAL SPINE CT:   1.  No acute cervical spine fracture.      CT Head w/o Contrast   Final Result   IMPRESSION:   HEAD CT:   1.  No acute intracranial process.      CERVICAL SPINE CT:   1.  No acute cervical spine fracture.         Report per radiology    Laboratory:  Labs Ordered and Resulted from Time of ED Arrival to Time of ED Departure   BASIC METABOLIC PANEL - Abnormal       Result Value    Sodium 132 (*)     Potassium 4.9      Chloride 97 (*)     Carbon Dioxide (CO2) 26      Anion Gap 9      Urea Nitrogen 17.0      Creatinine 0.57      GFR Estimate >90      Calcium 9.1      Glucose 97     CBC WITH PLATELETS AND DIFFERENTIAL - Abnormal    WBC Count  10.5      RBC Count 3.91      Hemoglobin 11.3 (*)     Hematocrit 36.4      MCV 93      MCH 28.9      MCHC 31.0 (*)     RDW 14.6      Platelet Count 338      % Neutrophils 66      % Lymphocytes 17      % Monocytes 13      % Eosinophils 2      % Basophils 1      % Immature Granulocytes 1      NRBCs per 100 WBC 0      Absolute Neutrophils 7.0      Absolute Lymphocytes 1.7      Absolute Monocytes 1.4 (*)     Absolute Eosinophils 0.2      Absolute Basophils 0.1      Absolute Immature Granulocytes 0.1      Absolute NRBCs 0.0     TYPE AND SCREEN, ADULT    ABO/RH(D) O POS      Antibody Screen Negative      SPECIMEN EXPIRATION DATE [id number]     ABO/RH TYPE AND SCREEN      Emergency Department Course & Assessments:       Interventions:  Medications   morphine (PF) injection 4 mg (4 mg Intravenous $Given 11/10/23 5743)   losartan (COZAAR) half-tab 12.5 mg (has no administration in time range)   mexiletine (MEXITIL) capsule 150 mg (has no administration in time range)   ondansetron (ZOFRAN ODT) ODT tab 4 mg (has no administration in time range)   oxyBUTYnin ER (DITROPAN XL) 24 hr tablet 5 mg (5 mg Oral Not Given 11/11/23 4788)   predniSONE (DELTASONE) tablet 25 mg (has no administration in time range)   senna-docusate (SENOKOT-S/PERICOLACE) 8.6-50 MG per tablet 1 tablet (has no administration in time range)   Vitamin D3 (CHOLECALCIFEROL) tablet 50 mcg (has no administration in time range)   melatonin tablet 1 mg (has no administration in time range)   HOLD: ALL Anticoagulant medications until AFTER surgery (has no administration in time range)   ondansetron (ZOFRAN ODT) ODT tab 4 mg (has no administration in time range)     Or   ondansetron (ZOFRAN) injection 4 mg (has no administration in time range)   prochlorperazine (COMPAZINE) injection 5 mg (has no administration in time range)     Or   prochlorperazine (COMPAZINE) tablet 5 mg (has no administration in time range)     Or   prochlorperazine (COMPAZINE) suppository  12.5 mg (has no administration in time range)   HYDROmorphone (DILAUDID) injection 0.2 mg (has no administration in time range)     Or   HYDROmorphone (PF) (DILAUDID) injection 0.5 mg (has no administration in time range)   acetaminophen (TYLENOL) tablet 975 mg (975 mg Oral $Given 11/11/23 0454)   acetaminophen (TYLENOL) tablet 650 mg (has no administration in time range)   ketorolac (TORADOL) injection 15 mg (15 mg Intravenous $Given 11/10/23 2305)   sodium chloride (PF) 0.9% PF flush 100 mL (57 mLs Intravenous $Given 11/11/23 0050)   iopamidol (ISOVUE-370) solution 500 mL (60 mLs Intravenous $Given 11/11/23 0050)        Assessments/Consultations/Discussion of Management or Tests:  ED Course as of 11/11/23 0633   Fri Nov 10, 2023   2217 I obtained the history and examined the patient as noted above.    Sat Nov 11, 2023   0138 I rechecked and updated the patient regarding imaging results. C-collar cleared.   0143 I consulted with Dr. Jo, of the hospitalist team, regarding the patient. They accepted the patient for admission.        Social Determinants of Health affecting care:   None    Disposition:  The patient was admitted to the hospital under the care of Dr. Jo.     Impression & Plan    Medical Decision Making:  Patient is a 78-year-old female with past medical history of dementia, high blood pressure and generalized anxiety disorder who presents to the emergency department after an unwitnessed fall that was presumed to be mechanical with headache, neck pain and severe pain of her right hip.  Unfortunately patient was found to have a right intratrochanteric fracture but no other acute traumatic injuries on her thorough trauma examination.  Patient be admitted for orthopedic surgery consultation and treatment.    Diagnosis:    ICD-10-CM    1. Closed displaced intertrochanteric fracture of right femur, initial encounter (H)  S72.141A            Scribe Disclosure:  Gail FAITH, am serving as a scribe  at 10:14 PM on 11/10/2023 to document services personally performed by Carlos Adams MD based on my observations and the provider's statements to me.     11/10/2023   Carlos Adams MD Battista, Christopher Joseph, MD  11/11/23 0647

## 2023-11-11 NOTE — ED NOTES
[facility]  ED Nurse Handoff Report    ED Chief complaint: Fall  . ED Diagnosis:   Final diagnoses:   None       Allergies:   Allergies   Allergen Reactions    Amoxicillin     Hydrochlorothiazide W-Triamterene Other (See Comments)     Hyponatremia    Sertraline Other (See Comments)     Diarrhea       Code Status: Full Code    Activity level - Baseline/Home:   wheelchair .  Activity Level - Current:   assist of 2.   Lift room needed: Yes.   Bariatric: No   Needed: No   Isolation: No.   Infection: Not Applicable.     Respiratory status: Room air    Vital Signs (within 30 minutes):   Vitals:    11/10/23 2207 11/10/23 2209   BP:  (!) 163/97   Pulse:  96   Resp:  20   Temp: 98.2  F (36.8  C)    TempSrc: Oral    SpO2:  97%       Cardiac Rhythm:  ,      Pain level:    Patient confused: Yes.   Patient Falls Risk: patient and family education.   Elimination Status:  has not voided in the ED      Patient Report - Initial Complaint: fall.   Focused Assessment: pt had an unwitnessed fall at [facility].  Pt c/o pain in her right hip and elbow.  Still awaiting scans.       Abnormal Results:   Labs Ordered and Resulted from Time of ED Arrival to Time of ED Departure - No data to display     CT Chest/Abdomen/Pelvis w Contrast    (Results Pending)   XR Femur Right 2 Views    (Results Pending)   Elbow XR, G/E 3 views, right    (Results Pending)   CT Head w/o Contrast    (Results Pending)   CT Cervical Spine w/o Contrast    (Results Pending)       Treatments provided: labs, imaging  Family Comments:  at bedside and supportive in cares  OBS brochure/video discussed/provided to patient:  No  ED Medications:   Medications   morphine (PF) injection 4 mg (4 mg Intravenous $Given 11/10/23 2280)   ketorolac (TORADOL) injection 15 mg (15 mg Intravenous $Given 11/10/23 230)       Drips infusing:  No  For the majority of the shift this patient was Green.   Interventions performed were n/a.    Sepsis  treatment initiated: No    Cares/treatment/interventions/medications to be completed following ED care: see hospitalist notes.  Pt is from memory care and has difficulty following conversations.  Will ask the same questions over and over    ED Nurse Name: Ivana Beach RN  11:17 PM  RECEIVING UNIT ED HANDOFF REVIEW    Above ED Nurse Handoff Report was reviewed: Yes  Reviewed by: Yordan Lawton RN on November 11, 2023 at 3:32 AM

## 2023-11-11 NOTE — PLAN OF CARE
Goal Outcome Evaluation:      Plan of Care Reviewed With: patient, spouse    Overall Patient Progress: improving    Pt returned back to room 645 from PACU @ 1545. Pt disorientated to place, time, and situation. Lethargic but arouses to voice. VS stable; afebrile. Denies pain. CMS intact. Dressing CDI. Pt DTV. Tolerating clear liquids so far.

## 2023-11-11 NOTE — ED TRIAGE NOTES
Pt BIBA from memory care for unwitnessed fall. Pt found on bathroom floor. Pt c/o right hip pain - shortened and externally rotated. Pt given fentanyl 50mcg en route. Pt in ccollar per EMS protocol. Staff at [facility]TC facility estimated pt on floor for approx 30 min. CMS intact.

## 2023-11-12 ENCOUNTER — APPOINTMENT (OUTPATIENT)
Dept: CT IMAGING | Facility: CLINIC | Age: 78
DRG: 481 | End: 2023-11-12
Attending: INTERNAL MEDICINE
Payer: MEDICARE

## 2023-11-12 ENCOUNTER — APPOINTMENT (OUTPATIENT)
Dept: PHYSICAL THERAPY | Facility: CLINIC | Age: 78
DRG: 481 | End: 2023-11-12
Payer: MEDICARE

## 2023-11-12 LAB
ALBUMIN UR-MCNC: NEGATIVE MG/DL
ANION GAP SERPL CALCULATED.3IONS-SCNC: 8 MMOL/L (ref 7–15)
APPEARANCE UR: CLEAR
BILIRUB UR QL STRIP: NEGATIVE
BUN SERPL-MCNC: 20.6 MG/DL (ref 8–23)
CALCIUM SERPL-MCNC: 8.6 MG/DL (ref 8.8–10.2)
CHLORIDE SERPL-SCNC: 98 MMOL/L (ref 98–107)
COLOR UR AUTO: ABNORMAL
CREAT SERPL-MCNC: 0.51 MG/DL (ref 0.51–0.95)
DEPRECATED HCO3 PLAS-SCNC: 27 MMOL/L (ref 22–29)
EGFRCR SERPLBLD CKD-EPI 2021: >90 ML/MIN/1.73M2
ERYTHROCYTE [DISTWIDTH] IN BLOOD BY AUTOMATED COUNT: 14.4 % (ref 10–15)
GLUCOSE BLDC GLUCOMTR-MCNC: 103 MG/DL (ref 70–99)
GLUCOSE SERPL-MCNC: 109 MG/DL (ref 70–99)
GLUCOSE SERPL-MCNC: 109 MG/DL (ref 70–99)
GLUCOSE UR STRIP-MCNC: NEGATIVE MG/DL
HCT VFR BLD AUTO: 32.2 % (ref 35–47)
HGB BLD-MCNC: 10 G/DL (ref 11.7–15.7)
HGB UR QL STRIP: NEGATIVE
KETONES UR STRIP-MCNC: 20 MG/DL
LEUKOCYTE ESTERASE UR QL STRIP: NEGATIVE
MCH RBC QN AUTO: 28.6 PG (ref 26.5–33)
MCHC RBC AUTO-ENTMCNC: 31.1 G/DL (ref 31.5–36.5)
MCV RBC AUTO: 92 FL (ref 78–100)
NITRATE UR QL: NEGATIVE
PH UR STRIP: 6.5 [PH] (ref 5–7)
PLATELET # BLD AUTO: 321 10E3/UL (ref 150–450)
POTASSIUM SERPL-SCNC: 4.3 MMOL/L (ref 3.4–5.3)
RBC # BLD AUTO: 3.5 10E6/UL (ref 3.8–5.2)
RBC URINE: 0 /HPF
SODIUM SERPL-SCNC: 133 MMOL/L (ref 135–145)
SP GR UR STRIP: 1.01 (ref 1–1.03)
UROBILINOGEN UR STRIP-MCNC: NORMAL MG/DL
WBC # BLD AUTO: 12.6 10E3/UL (ref 4–11)
WBC URINE: <1 /HPF

## 2023-11-12 PROCEDURE — 99232 SBSQ HOSP IP/OBS MODERATE 35: CPT | Performed by: INTERNAL MEDICINE

## 2023-11-12 PROCEDURE — 250N000013 HC RX MED GY IP 250 OP 250 PS 637: Performed by: HOSPITALIST

## 2023-11-12 PROCEDURE — G1010 CDSM STANSON: HCPCS

## 2023-11-12 PROCEDURE — 97530 THERAPEUTIC ACTIVITIES: CPT | Mod: GP | Performed by: PHYSICAL THERAPIST

## 2023-11-12 PROCEDURE — 0042T CT HEAD PERFUSION W CONTRAST: CPT

## 2023-11-12 PROCEDURE — 36415 COLL VENOUS BLD VENIPUNCTURE: CPT | Performed by: HOSPITALIST

## 2023-11-12 PROCEDURE — 250N000009 HC RX 250: Performed by: INTERNAL MEDICINE

## 2023-11-12 PROCEDURE — 250N000011 HC RX IP 250 OP 636: Performed by: INTERNAL MEDICINE

## 2023-11-12 PROCEDURE — 97161 PT EVAL LOW COMPLEX 20 MIN: CPT | Mod: GP | Performed by: PHYSICAL THERAPIST

## 2023-11-12 PROCEDURE — 250N000013 HC RX MED GY IP 250 OP 250 PS 637

## 2023-11-12 PROCEDURE — 250N000011 HC RX IP 250 OP 636: Mod: JZ | Performed by: INTERNAL MEDICINE

## 2023-11-12 PROCEDURE — 93010 ELECTROCARDIOGRAM REPORT: CPT | Performed by: INTERNAL MEDICINE

## 2023-11-12 PROCEDURE — 250N000013 HC RX MED GY IP 250 OP 250 PS 637: Performed by: INTERNAL MEDICINE

## 2023-11-12 PROCEDURE — 999N000111 HC STATISTIC OT IP EVAL DEFER

## 2023-11-12 PROCEDURE — 80048 BASIC METABOLIC PNL TOTAL CA: CPT | Performed by: HOSPITALIST

## 2023-11-12 PROCEDURE — 81001 URINALYSIS AUTO W/SCOPE: CPT | Performed by: INTERNAL MEDICINE

## 2023-11-12 PROCEDURE — 70496 CT ANGIOGRAPHY HEAD: CPT | Mod: MG

## 2023-11-12 PROCEDURE — 250N000011 HC RX IP 250 OP 636

## 2023-11-12 PROCEDURE — 70498 CT ANGIOGRAPHY NECK: CPT | Mod: MG

## 2023-11-12 PROCEDURE — 120N000001 HC R&B MED SURG/OB

## 2023-11-12 PROCEDURE — 85027 COMPLETE CBC AUTOMATED: CPT | Performed by: HOSPITALIST

## 2023-11-12 PROCEDURE — 82947 ASSAY GLUCOSE BLOOD QUANT: CPT | Performed by: HOSPITALIST

## 2023-11-12 PROCEDURE — 99207 PR NO CHARGE LOS: CPT | Performed by: NURSE PRACTITIONER

## 2023-11-12 PROCEDURE — 250N000011 HC RX IP 250 OP 636: Performed by: HOSPITALIST

## 2023-11-12 RX ORDER — LORAZEPAM 2 MG/ML
0.5 INJECTION INTRAMUSCULAR ONCE
Status: COMPLETED | OUTPATIENT
Start: 2023-11-12 | End: 2023-11-12

## 2023-11-12 RX ORDER — IOPAMIDOL 755 MG/ML
500 INJECTION, SOLUTION INTRAVASCULAR ONCE
Status: COMPLETED | OUTPATIENT
Start: 2023-11-12 | End: 2023-11-12

## 2023-11-12 RX ADMIN — HYDROMORPHONE HYDROCHLORIDE 0.2 MG: 0.2 INJECTION, SOLUTION INTRAMUSCULAR; INTRAVENOUS; SUBCUTANEOUS at 21:49

## 2023-11-12 RX ADMIN — HYDROMORPHONE HYDROCHLORIDE 0.2 MG: 0.2 INJECTION, SOLUTION INTRAMUSCULAR; INTRAVENOUS; SUBCUTANEOUS at 04:33

## 2023-11-12 RX ADMIN — CARBIDOPA AND LEVODOPA 2 TABLET: 25; 100 TABLET, EXTENDED RELEASE ORAL at 08:05

## 2023-11-12 RX ADMIN — CARBIDOPA AND LEVODOPA 2 TABLET: 25; 100 TABLET, EXTENDED RELEASE ORAL at 16:35

## 2023-11-12 RX ADMIN — HALOPERIDOL LACTATE 2 MG: 5 INJECTION, SOLUTION INTRAMUSCULAR at 21:50

## 2023-11-12 RX ADMIN — IOPAMIDOL 117 ML: 755 INJECTION, SOLUTION INTRAVENOUS at 08:52

## 2023-11-12 RX ADMIN — SODIUM CHLORIDE 80 ML: 9 INJECTION, SOLUTION INTRAVENOUS at 08:52

## 2023-11-12 RX ADMIN — ACETAMINOPHEN 975 MG: 325 TABLET, FILM COATED ORAL at 04:24

## 2023-11-12 RX ADMIN — LORAZEPAM 0.5 MG: 2 INJECTION INTRAMUSCULAR; INTRAVENOUS at 15:31

## 2023-11-12 RX ADMIN — POLYETHYLENE GLYCOL 3350 17 G: 17 POWDER, FOR SOLUTION ORAL at 08:00

## 2023-11-12 RX ADMIN — CEFAZOLIN 1 G: 1 INJECTION, POWDER, FOR SOLUTION INTRAMUSCULAR; INTRAVENOUS at 04:24

## 2023-11-12 RX ADMIN — ENOXAPARIN SODIUM 40 MG: 40 INJECTION SUBCUTANEOUS at 11:13

## 2023-11-12 RX ADMIN — LORAZEPAM 0.5 MG: 2 INJECTION INTRAMUSCULAR; INTRAVENOUS at 18:15

## 2023-11-12 RX ADMIN — DIVALPROEX SODIUM 125 MG: 125 TABLET, DELAYED RELEASE ORAL at 08:43

## 2023-11-12 RX ADMIN — CARBIDOPA AND LEVODOPA 2 TABLET: 25; 100 TABLET, EXTENDED RELEASE ORAL at 11:17

## 2023-11-12 ASSESSMENT — ACTIVITIES OF DAILY LIVING (ADL)
ADLS_ACUITY_SCORE: 49
ADLS_ACUITY_SCORE: 45
ADLS_ACUITY_SCORE: 45
ADLS_ACUITY_SCORE: 49
ADLS_ACUITY_SCORE: 45
ADLS_ACUITY_SCORE: 49

## 2023-11-12 NOTE — PLAN OF CARE
Occupational Therapy: Orders received. Chart reviewed and discussed with care team including PT.? Patient presenting from memory care and PT recommending return to memory care facility with assist for all ADL and mobility. Will defer OT at this time as PT to address IP therapy needs. Defer recommendations to IP PT.? Will complete orders.

## 2023-11-12 NOTE — PROVIDER NOTIFICATION
Antonio Page to Dr. Honeycutt-Pt was not able to get through MRI d/t agitation even after IV ativan. Will try again later.

## 2023-11-12 NOTE — PROGRESS NOTES
X-cover    Responded to RRT on this patient    Nursing reported acute neurologic change this AM    Pt is s/p R IT fx repair yesterday    Has hx of dementia and parkinsons dz    Nursing reports pt less interactive this AM; new R sided facial droop, and garbled speech compared to previous    Received Dilaudid dose at 0430    Nurse reports on assessment earlier this AM pt did not have this degree of neuro impairment    Exam  BP (!) 146/63 (BP Location: Left arm)   Pulse 94   Temp 98.5  F (36.9  C) (Temporal)   Resp 16   Wt 77.1 kg (170 lb)   SpO2 95%   Breastfeeding No   BMI 29.18 kg/m     - non-verbal  - sitting up in bedside chair.  Eyes open  - does not follow commands  - spontaneously moving BUE  - RRR  - CTA bilaterally    Glucose 100 (bedside glucometer)  BMP this AM unremarkable    Impression:  - acute neuro change in patient with baseline dementia and parkinsons who is s/p R hip surgery yesterday.  Challenging exam due to dementia and parkinsons hx with unclear baseline  Ddx:  delirium related to opioids/pain meds (favor) vs acute CVA    Plan:  - code stroke called.  Obtain cerebral imaging; r/o acute CVA  - check UA  - I spoke with stroke neuro who will review films upon completion

## 2023-11-12 NOTE — PROVIDER NOTIFICATION
Received call from radiology-wanted to give CT results to Dr. Honeycutt-page sent to Dr. Honeycutt with radiologist's number to connect. Also, received call from Mary Ann Lomas NP with neurology that code stroke can be de-escalated and will order an MRI to be done as well. Page sent to Dr. Honeycutt to notify.

## 2023-11-12 NOTE — PROGRESS NOTES
SPIRITUAL HEALTH SERVICES  SPIRITUAL ASSESSMENT Progress Note  [facility] Ortho Spine      REFERRAL SOURCE: Admission Request/Rapid Response    I responded to the Rapid Response today and no family was present. Later in, I checked in with pt who declined a desire for a visit. It was unclear if she understood or could track who I was. I consulted with nursing staff who suggested family may welcome support. I attempted later to visit but they were not available.     PLAN: [facility] will continue to follow and assess needs.     Ain Spain MA  Associate    [facility] On Call Pager: [phone]

## 2023-11-12 NOTE — CONSULTS
[facility]    Stroke Telephone Note    I was called by  on 11/12/23 regarding patient David Hogan. The patient is a 78 year old female with PMHx significant for DM, anemia, adrenal adenoma, Alzheimer's, orthostatic hypotension, Parkinson's (on sinemet 25/100 2 tabs TID), remote tobacco use. She was admitted 11/10 after a fall from standing with right intertrochanteric femur fracture. She is POD1 ORIF. Stroke code was called this morning due to pt being less interactive/responsive, garbled speech and right sided facial weakness. She is not a thrombolysis candidate due to recent surgery.     BP (!) 166/75 (BP Location: Right arm)   Pulse 91   Temp 97.8  F (36.6  C) (Temporal)   Resp 16   Wt 79 kg (174 lb 2.6 oz)   SpO2 93%   Breastfeeding No   BMI 29.90 kg/m       Stroke Code Data (for stroke code without tele)  Stroke code activated 11/12/23   0840   Stroke provider first response  11/12/23   0844            Last known normal 11/12/23   0700        Time of discovery   (or onset of symptoms) 11/12/23   0820   Head CT read by Stroke Neuro Dr/Provider 11/12/23   0915   Was stroke code de-escalated? Yes 11/12/23 0919          Imaging Findings  CT head: no obvious acute pathology, atrophy, CSVID  CTA head/neck: no LVO, no dissection, bilateral ICA stenosis (R 90%, L 50%)  CT perfusion: normal     Intravenous Thrombolysis  Not given due to:   - surgery/trauma within the past 14 days    Endovascular Treatment  Not initiated due to absence of proximal vessel occlusion    Impression  Encenphalopathy, right-sided facial weakness; unknown etiology     Recommendations   - MRI brain w/ and w/out contrast (ordered)  - if MRI shows acute stroke, please place IP stroke consult     Case discussed with vascular neurology attending Dr. Rubalcava.     My recommendations are based on the information provided over the phone by David Hogan's in-person providers. They are not intended to replace  "the clinical judgment of her in-person providers. I was not requested to personally see or examine the patient at this time.    Mary Ann Lomas, NP  Vascular Neurology    To page me or covering stroke neurology team member, click here: [email or web address]  Choose \"On Call\" tab at top, then select \"NEUROLOGY/ALL SITES\" from middle drop-down box, press Enter, then look for \"stroke\" or \"telestroke\" for your site.   "

## 2023-11-12 NOTE — PROGRESS NOTES
11/12/23 0857   Appointment Info   Signing Clinician's Name / Credentials (PT) Micah Suarez DPT   Rehab Comments (PT) WBAT R LE, no precautions   Living Environment   Living Environment Comments Per report, pt lives in memory care. Mostly w/c bound, performs pivot transfers per RN report from spouse.   Self-Care   Usual Activity Tolerance fair   Current Activity Tolerance poor   Equipment Currently Used at Home wheelchair, manual   Fall history within last six months yes   Number of times patient has fallen within last six months 1   General Information   Onset of Illness/Injury or Date of Surgery 11/10/23   Referring Physician Bc Cabrales PA-C   Patient/Family Therapy Goals Statement (PT) None stated   Pertinent History of Current Problem (include personal factors and/or comorbidities that impact the POC) Per H&P, pt is a 78 year old female with past medical history significant for dementia lives in memory care, history of osteoporosis, osteoarthritis, hypertension, hyponatremia, prior tobacco use, chronic back pain due to lumbar stenosis presented to the emergency room after a fall from standing height with acute right-sided intertrochanteric femur fracture.  Pt is POD1 s/p ORIF.   Existing Precautions/Restrictions fall;weight bearing   Weight-Bearing Status - RLE weight-bearing as tolerated   Cognition   Affect/Mental Status (Cognition) confused   Orientation Status (Cognition) oriented to;person   Follows Commands (Cognition) follows one-step commands;0-24% accuracy;delayed response/completion;initiation impaired;physical/tactile prompts required;repetition of directions required;verbal cues/prompting required   Pain Assessment   Patient Currently in Pain Yes, see Vital Sign flowsheet  (verbalizes discomfort with mobility)   Range of Motion (ROM)   ROM Comment decreased R LE ROM   Strength (Manual Muscle Testing)   Strength (Manual Muscle Testing) Deficits observed during functional mobility    Strength Comments unable to perform R SLR; difficulty assessing L LE 2/2 command following   Bed Mobility   Comment, (Bed Mobility) maxA supine to sit   Transfers   Comment, (Transfers) maxA sit <> stand with FWW   Gait/Stairs (Locomotion)   Comment, (Gait/Stairs) unable to perform at eval   Balance   Balance Comments fair+ sitting EOB; poor standing with FWW   Clinical Impression   Criteria for Skilled Therapeutic Intervention Yes, treatment indicated   PT Diagnosis (PT) impaired functional mobility   Influenced by the following impairments decreased R LE ROM; impaired strenght, balance   Functional limitations due to impairments impaired bed mobility, transfers   Clinical Presentation (PT Evaluation Complexity) stable   Clinical Presentation Rationale Pt is medically stable   Clinical Decision Making (Complexity) low complexity   Planned Therapy Interventions (PT) balance training;bed mobility training;home exercise program;neuromuscular re-education;patient/family education;ROM (range of motion);strengthening;transfer training;cryotherapy   Risk & Benefits of therapy have been explained evaluation/treatment results reviewed;care plan/treatment goals reviewed;risks/benefits reviewed;current/potential barriers reviewed;participants included;patient   PT Total Evaluation Time   PT Eval, Low Complexity Minutes (38717) 7   Physical Therapy Goals   PT Frequency 5x/week   PT Predicted Duration/Target Date for Goal Attainment 11/14/23   PT Goals Bed Mobility;Transfers   PT: Bed Mobility Minimal assist;Supine to/from sit   PT: Transfers Minimal assist;Sit to/from stand;Bed to/from chair   PT Discharge Planning   PT Plan progress bed mobility/transfers, TE   PT Discharge Recommendation (DC Rec) home with assist;home with home care physical therapy   PT Rationale for DC Rec Pt below baseline mobility, needing Ax2 with magy steady at this time. Anticipate would best be served by return to home if facility can perform lift  assistance for transfers. Pt would benefit from trial of HHPT to progress mobility.   PT Brief overview of current status Ax2 magy steady/lift   PT Equipment Needed at Discharge lift device   Total Session Time   Timed Code Treatment Minutes 20   Total Session Time (sum of timed and untimed services) 27

## 2023-11-12 NOTE — PROGRESS NOTES
[facility]  Hospitalist Progress Note  Admit 11/10/2023 10:03 PM    Name: David Hogan    MRN: [id number]  Provider:  Joselo Honeycutt MD, [facility]    Date of Service: 11/12/2023     Reason for Stay (Diagnosis): Intratrochanteric fracture         Summary of hospital stay & Assessment/Plan:   Summary of Stay: David Hogan is a 78 year old female who was admitted on 11/10/2023   78-year-old female with a history of dementia, osteoporosis, osteoarthritis, hypertension, hyponatremia, and chronic back pain due to lumbar stenosis presented to the emergency room with an acute right-sided intertrochanteric femur fracture. She also complained of pain in her right elbow. The fall occurred while attempting to transfer from a seated position on her wheelchair to use the bathroom. 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  On 11/12 AM there was concern for possible new neurological finding with expressive aphasia, code stroke was called, patient had the imaging below and the MRI of the brain is still pending.  She received earlier in the morning Dilaudid for pain.    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, and Parkinson's disease  New neurological findings: expressive aphasia, code stroke called on 11/12, CT perfusion was done chronic greater than 90% stenosis in the origin of the right ICA, neuro stroke following MRI pending        POD1 ORIF for the right intertrochanteric femur fracture      Stroke code called due to new neurological findings       MRI brain with and without contrast ordered       Patient on Sinemet 25/100 2 tabs TID for Parkinson's      Pain management with Dilaudid, might be confusing the neurological picture especially with patient underlying known deficits  Chronic  comorbidities including DM, anemia, adrenal adenoma, Alzheimer's, orthostatic hypotension, and remote tobacco use        DVT Prophylaxis: Enoxaparin (Lovenox) SQ  Code Status:  No CPR- Do NOT Intubate    Disposition Plan     Expected discharge in 2 days to transitional care unit once above work-up is done and placed for rehab is available.     Entered: Joselo Honeycutt MD 11/12/2023, 12:23 PM                 Interval History:       Having difficulty expressing mumbling word difficult to obtain history  Today's plan detailed above discussed with nursing               Physical Exam:   Physical Exam   Temp: 97.8  F (36.6  C) Temp src: Temporal BP: (!) 166/75 Pulse: 91   Resp: 16 SpO2: 93 % O2 Device: Oxymask Oxygen Delivery: 2 LPM  Vitals:    11/11/23 1212 11/12/23 0927   Weight: 77.1 kg (170 lb) 79 kg (174 lb 2.6 oz)     I/O last 3 completed shifts:  In: 600 [I.V.:600]  Out: -         GENERAL:  Comfortable.   PSYCH: Unable to assess  EYES: PERRLA, Normal conjunctiva.  HEART:  Normal S1, S2 with no edema.  LUNGS:  Clear to auscultation, normal Respiratory effort.  ABDOMEN:  Soft, no hepatosplenomegaly, normal bowel sounds.  SKIN:  Dry to touch, No rash.  Neuro: Seems to be moving all extremities although she is not following commands and unable to assess, speech is definitely mumbled seems to have expressive difficulty    Medications    lactated ringers 75 mL/hr at 11/11/23 1647      acetaminophen  975 mg Oral Q8H    carbidopa-levodopa  2 tablet Oral TID    divalproex sodium delayed-release  125 mg Oral Q12H [facility] (08/20)    enoxaparin ANTICOAGULANT  40 mg Subcutaneous Q24H    gabapentin  100 mg Oral TID    LORazepam  0.5 mg Oral At Bedtime    polyethylene glycol  17 g Oral Daily    [START ON 11/13/2023] predniSONE  25 mg Oral Once per day on Monday Thursday    senna-docusate  1 tablet Oral BID    sodium chloride (PF)  3 mL Intracatheter Q8H    traZODone  50 mg Oral At Bedtime    Vitamin D3  50 mcg Oral Daily      Data     -Data reviewed today:  I personally reviewed  all new labs and imaging results over the last 24 hours.    Recent Labs   Lab 11/12/23  0722 11/11/23  0517 11/10/23  2344   WBC 12.6* 11.0 10.5   HGB 10.0* 11.7 11.3*   HCT 32.2* 37.0 36.4   MCV 92 91 93    367 338     Recent Labs   Lab 11/12/23  0835 11/12/23  0722 11/11/23  1652 11/11/23  0517 11/10/23  2344   NA  --  133*  --  136 132*   POTASSIUM  --  4.3  --  3.9 4.9   CHLORIDE  --  98  --  99 97*   CO2  --  27  --  29 26   ANIONGAP  --  8  --  8 9   * 109*  109*  --  106* 97   BUN  --  20.6  --  17.3 17.0   CR  --  0.51 0.46* 0.55 0.57   GFRESTIMATED  --  >90 >90 >90 >90   ANN-MARIE  --  8.6*  --  8.9 9.1       Recent Results (from the past 24 hour(s))   XR Surgery XIOMY L/T 5 Min Fluoro w Stills    Narrative    This exam was marked as non-reportable because it will not be read by a   radiologist or a [place] non-radiologist provider.         CT Head w/o Contrast    Narrative    EXAM: CT HEAD W/O CONTRAST  LOCATION: [facility]  DATE: 11/12/2023    INDICATION: Altered mental status. Decreased communication. Tier 2 code stroke  COMPARISON: Head CT 11/11/2023  TECHNIQUE: Routine CT Head without IV contrast. Multiplanar reformats. Dose reduction techniques were used.    FINDINGS:  INTRACRANIAL CONTENTS: No intracranial hemorrhage, extraaxial collection, or mass effect.  No CT evidence of acute infarct. Moderate presumed chronic small vessel ischemic changes. Moderate generalized volume loss. No hydrocephalus.     VISUALIZED ORBITS/SINUSES/MASTOIDS: No intraorbital abnormality. No paranasal sinus mucosal disease. No middle ear or mastoid effusion.    BONES/SOFT TISSUES: No acute abnormality.      Impression    IMPRESSION:  1.  No CT evidence for acute intracranial process.  2.  Brain atrophy and presumed chronic microvascular ischemic changes as above.  3.  No interval change.   CTA Head Neck w Contrast    Narrative    EXAM:  CTA HEAD NECK W CONTRAST  LOCATION: [facility]  DATE: 11/12/2023    INDICATION: Decreased mental status. Decreased communication. Tier 2 code stroke  COMPARISON: None.  TECHNIQUE: Head and neck CT angiogram with IV contrast. Axial helical CT images of the head and neck vessels obtained during the arterial phase of intravenous contrast administration. Axial 2D reconstructed images and multiplanar 3D MIP reconstructed   images of the head and neck vessels were performed by the technologist. Additional CT cerebral perfusion was performed utilizing a second contrast bolus. Perfusion data were post processed with generation of standard perfusion maps and estimation of   ischemic/infarcted volumes utilizing standard threshold values. Dose reduction techniques were used. All stenosis measurements made according to NASCET criteria unless otherwise specified.  CONTRAST: 67mL Isovue 370    FINDINGS:   HEAD CTA:  ANTERIOR CIRCULATION: No stenosis/occlusion, aneurysm, or high flow vascular malformation. Standard [place] anatomy.    POSTERIOR CIRCULATION: No stenosis/occlusion, aneurysm, or high flow vascular malformation. Balanced vertebral arteries supply a normal basilar artery.     DURAL VENOUS SINUSES: Expected enhancement of the major dural venous sinuses.    NECK CTA:  RIGHT CAROTID: Atherosclerotic plaque results in critical, 90% or greater, stenosis in the right ICA. No dissection.    LEFT CAROTID: Atherosclerotic plaque results in less than 50% stenosis in the left ICA. No dissection.    VERTEBRAL ARTERIES: No focal stenosis or dissection. Balanced vertebral arteries.    AORTIC ARCH: Classic aortic arch anatomy with no significant stenosis at the origin of the great vessels.    NONVASCULAR STRUCTURES: Unremarkable.    CT PERFUSION:  PERFUSION MAPS: Symmetrical cerebral perfusion. No focal deficits in cerebral blood flow or volume to suggest ischemia/oligemia.    RAPID ANALYSIS:  CBF<30%:  0  Tmax>6sec: 0  Mismatch volume: 0  Mismatch ratio: None      Impression    IMPRESSION:   HEAD CTA:   1.  Normal CTA [place].    NECK CTA:  1.  Chronic greater than 90% stenosis in the origin of the right ICA.  2.  Chronic less than 50% stenosis in the origin of the left ICA.  3.  Normal vertebral arteries.    CT PERFUSION:  1.  Normal cerebral perfusion.    Head CT findings and head and neck CTA findings were discussed with Dr. Honeycutt at 9:16 AM hours on 11/12/2023.   CT Head Perfusion w Contrast    Narrative    EXAM: CT HEAD PERFUSION W CONTRAST  LOCATION: [facility]  DATE: 11/12/2023    INDICATION: Decreased mental status. Decreased communication. Tier 2 code stroke  COMPARISON: None.  TECHNIQUE: Head and neck CT angiogram with IV contrast. Axial helical CT images of the head and neck vessels obtained during the arterial phase of intravenous contrast administration. Axial 2D reconstructed images and multiplanar 3D MIP reconstructed   images of the head and neck vessels were performed by the technologist. Additional CT cerebral perfusion was performed utilizing a second contrast bolus. Perfusion data were post processed with generation of standard perfusion maps and estimation of   ischemic/infarcted volumes utilizing standard threshold values. Dose reduction techniques were used. All stenosis measurements made according to NASCET criteria unless otherwise specified.  CONTRAST: 50mL Isovue 370    FINDINGS:   HEAD CTA:  ANTERIOR CIRCULATION: No stenosis/occlusion, aneurysm, or high flow vascular malformation. Standard [place] anatomy.    POSTERIOR CIRCULATION: No stenosis/occlusion, aneurysm, or high flow vascular malformation. Balanced vertebral arteries supply a normal basilar artery.     DURAL VENOUS SINUSES: Expected enhancement of the major dural venous sinuses.    NECK CTA:  RIGHT CAROTID: Atherosclerotic plaque results in critical, 90% or greater, stenosis in the  right ICA. No dissection.    LEFT CAROTID: Atherosclerotic plaque results in less than 50% stenosis in the left ICA. No dissection.    VERTEBRAL ARTERIES: No focal stenosis or dissection. Balanced vertebral arteries.    AORTIC ARCH: Classic aortic arch anatomy with no significant stenosis at the origin of the great vessels.    NONVASCULAR STRUCTURES: Unremarkable.    CT PERFUSION:  PERFUSION MAPS: Symmetrical cerebral perfusion. No focal deficits in cerebral blood flow or volume to suggest ischemia/oligemia.    RAPID ANALYSIS:  CBF<30%: 0  Tmax>6sec: 0  Mismatch volume: 0  Mismatch ratio: None      Impression    IMPRESSION:   HEAD CTA:   1.  Normal CTA [place].    NECK CTA:  1.  Chronic greater than 90% stenosis in the origin of the right ICA.  2.  Chronic less than 50% stenosis in the origin of the left ICA.  3.  Normal vertebral arteries.    CT PERFUSION:  1.  Normal cerebral perfusion.    Head CT findings and head and neck CTA findings were discussed with Dr. Honeycutt at 9:16 AM hours on 11/12/2023.         This document was produced using voice recognition software

## 2023-11-12 NOTE — PROVIDER NOTIFICATION
RRT called on pt around 0830 this AM: After getting pt up to the chair with PT via magy walker. This writer noticed R side facial droop, garbled speech, change from yesterday. After assessment by RRT team, code stroke was called.    CT done, awaiting MRI.

## 2023-11-12 NOTE — PROGRESS NOTES
Orthopedics Daily Progress Note  David Hogan  November 12, 2023  Date of Admission: 11/10/2023      Post Op Day: 1 Day Post-Op   Procedures: Procedure(s):  OPEN REDUCTION INTERNAL FIXATION, FRACTURE, FEMUR, USING INTRAMEDULLARY DAMION AND FRACTURE TABLE      Interval History:  Patient sitting in chair. Rapid Response called upon visit today, Code Stroke. Patient having gargled speech more than usually. Not following commands. Unable to assess.    Temp:  [96.9  F (36.1  C)-97.5  F (36.4  C)] 97  F (36.1  C)  Pulse:  [] 97  Resp:  [8-20] 14  BP: (132-197)/(75-91) 176/85  SpO2:  [90 %-100 %] 98 %    Physical Exam:  Not following commands.   Placed on O2.   Dressing/wound c/d/I  Unable to assess 2/2 Code Stroke called.     Labs/Imaging:  Results for orders placed or performed during the hospital encounter of 11/10/23 (from the past 24 hour(s))   XR Surgery XIOMY L/T 5 Min Fluoro w Stills    Narrative    This exam was marked as non-reportable because it will not be read by a   radiologist or a [place] non-radiologist provider.         Creatinine   Result Value Ref Range    Creatinine 0.46 (L) 0.51 - 0.95 mg/dL    GFR Estimate >90 >60 mL/min/1.73m2         A/P - 78 year old female s/p right ORIF femur using IM damion      DVT proph:  Lovenox in the hospital followed by ASA 81 mg PO BID or 325 mg PO daily   Abx: Ancef x 24 hr po  Activity: WBAT. ROMAT.. PT/OT.  Dressing: Keep intact. Change if >60% saturated    Follow Up w/ Bc/Dr. Kemp in 2 weeks    Dispo: Pending PT progression and stable medically. SW following.      Nilda Merchant PA-C  [facility]

## 2023-11-12 NOTE — PLAN OF CARE
Patient vital signs are at baseline: Yes  Patient able to ambulate as they were prior to admission or with assist devices provided by therapies during their stay:  No,  Reason:  pt is W/C bound.  Patient MUST void prior to discharge:  Yes  Patient able to tolerate oral intake:  Yes  Pain has adequate pain control using Oral analgesics:  Yes  Does patient have an identified :  Yes  Has goal D/C date and time been discussed with patient:  Yes             Pt is alert to self with confusion. Pt was given scheduled Tylenol and Dilaudid for pain. VSS. Pt is on bedrest. Pt is post op. Surgical dressing clean, dry and intact. CMS intact.  Pt was given her scheduled ancef during the shift. Pt is sleeping in bed. Bed alarm in place and call light within each. Will continue to monitor and assess pt.

## 2023-11-13 ENCOUNTER — APPOINTMENT (OUTPATIENT)
Dept: MRI IMAGING | Facility: CLINIC | Age: 78
DRG: 481 | End: 2023-11-13
Attending: NURSE PRACTITIONER
Payer: MEDICARE

## 2023-11-13 ENCOUNTER — APPOINTMENT (OUTPATIENT)
Dept: SPEECH THERAPY | Facility: CLINIC | Age: 78
DRG: 481 | End: 2023-11-13
Attending: INTERNAL MEDICINE
Payer: MEDICARE

## 2023-11-13 LAB
ANION GAP SERPL CALCULATED.3IONS-SCNC: 6 MMOL/L (ref 7–15)
ATRIAL RATE - MUSE: 104 BPM
BUN SERPL-MCNC: 21.3 MG/DL (ref 8–23)
CALCIUM SERPL-MCNC: 8.7 MG/DL (ref 8.8–10.2)
CHLORIDE SERPL-SCNC: 100 MMOL/L (ref 98–107)
CREAT SERPL-MCNC: 0.52 MG/DL (ref 0.51–0.95)
DEPRECATED HCO3 PLAS-SCNC: 29 MMOL/L (ref 22–29)
DIASTOLIC BLOOD PRESSURE - MUSE: NORMAL MMHG
EGFRCR SERPLBLD CKD-EPI 2021: >90 ML/MIN/1.73M2
ERYTHROCYTE [DISTWIDTH] IN BLOOD BY AUTOMATED COUNT: 14.6 % (ref 10–15)
GLUCOSE SERPL-MCNC: 86 MG/DL (ref 70–99)
GLUCOSE SERPL-MCNC: 86 MG/DL (ref 70–99)
HCT VFR BLD AUTO: 27.9 % (ref 35–47)
HGB BLD-MCNC: 8.8 G/DL (ref 11.7–15.7)
INTERPRETATION ECG - MUSE: NORMAL
MCH RBC QN AUTO: 28.9 PG (ref 26.5–33)
MCHC RBC AUTO-ENTMCNC: 31.5 G/DL (ref 31.5–36.5)
MCV RBC AUTO: 92 FL (ref 78–100)
P AXIS - MUSE: 74 DEGREES
PLATELET # BLD AUTO: 304 10E3/UL (ref 150–450)
POTASSIUM SERPL-SCNC: 4.3 MMOL/L (ref 3.4–5.3)
PR INTERVAL - MUSE: 128 MS
QRS DURATION - MUSE: 90 MS
QT - MUSE: 338 MS
QTC - MUSE: 444 MS
R AXIS - MUSE: 53 DEGREES
RBC # BLD AUTO: 3.04 10E6/UL (ref 3.8–5.2)
SODIUM SERPL-SCNC: 135 MMOL/L (ref 135–145)
SYSTOLIC BLOOD PRESSURE - MUSE: NORMAL MMHG
T AXIS - MUSE: 62 DEGREES
VENTRICULAR RATE- MUSE: 104 BPM
WBC # BLD AUTO: 10.6 10E3/UL (ref 4–11)

## 2023-11-13 PROCEDURE — 92610 EVALUATE SWALLOWING FUNCTION: CPT | Mod: GN | Performed by: SPEECH-LANGUAGE PATHOLOGIST

## 2023-11-13 PROCEDURE — 250N000011 HC RX IP 250 OP 636: Mod: JZ

## 2023-11-13 PROCEDURE — 250N000011 HC RX IP 250 OP 636: Mod: JZ | Performed by: INTERNAL MEDICINE

## 2023-11-13 PROCEDURE — 250N000013 HC RX MED GY IP 250 OP 250 PS 637: Performed by: INTERNAL MEDICINE

## 2023-11-13 PROCEDURE — 85027 COMPLETE CBC AUTOMATED: CPT | Performed by: HOSPITALIST

## 2023-11-13 PROCEDURE — 80048 BASIC METABOLIC PNL TOTAL CA: CPT | Performed by: HOSPITALIST

## 2023-11-13 PROCEDURE — 82947 ASSAY GLUCOSE BLOOD QUANT: CPT | Performed by: ORTHOPAEDIC SURGERY

## 2023-11-13 PROCEDURE — 120N000001 HC R&B MED SURG/OB

## 2023-11-13 PROCEDURE — 70553 MRI BRAIN STEM W/O & W/DYE: CPT | Mod: MG

## 2023-11-13 PROCEDURE — A9585 GADOBUTROL INJECTION: HCPCS | Performed by: NURSE PRACTITIONER

## 2023-11-13 PROCEDURE — 255N000002 HC RX 255 OP 636: Performed by: NURSE PRACTITIONER

## 2023-11-13 PROCEDURE — 99233 SBSQ HOSP IP/OBS HIGH 50: CPT | Performed by: INTERNAL MEDICINE

## 2023-11-13 PROCEDURE — 250N000013 HC RX MED GY IP 250 OP 250 PS 637

## 2023-11-13 PROCEDURE — 250N000011 HC RX IP 250 OP 636: Mod: JZ | Performed by: HOSPITALIST

## 2023-11-13 PROCEDURE — 36415 COLL VENOUS BLD VENIPUNCTURE: CPT | Performed by: HOSPITALIST

## 2023-11-13 PROCEDURE — 250N000013 HC RX MED GY IP 250 OP 250 PS 637: Performed by: PSYCHIATRY & NEUROLOGY

## 2023-11-13 RX ORDER — NALOXONE HYDROCHLORIDE 0.4 MG/ML
0.4 INJECTION, SOLUTION INTRAMUSCULAR; INTRAVENOUS; SUBCUTANEOUS
Status: DISCONTINUED | OUTPATIENT
Start: 2023-11-13 | End: 2023-11-16 | Stop reason: HOSPADM

## 2023-11-13 RX ORDER — LORAZEPAM 0.5 MG/1
0.25 TABLET ORAL AT BEDTIME
Status: DISCONTINUED | OUTPATIENT
Start: 2023-11-13 | End: 2023-11-16 | Stop reason: HOSPADM

## 2023-11-13 RX ORDER — ASPIRIN 81 MG/1
81 TABLET ORAL 2 TIMES DAILY
Qty: 60 TABLET | Refills: 0 | Status: SHIPPED | OUTPATIENT
Start: 2023-11-13

## 2023-11-13 RX ORDER — CARBIDOPA AND LEVODOPA 25; 100 MG/1; MG/1
1 TABLET ORAL 3 TIMES DAILY
Status: DISCONTINUED | OUTPATIENT
Start: 2023-11-14 | End: 2023-11-16 | Stop reason: HOSPADM

## 2023-11-13 RX ORDER — NALOXONE HYDROCHLORIDE 0.4 MG/ML
0.2 INJECTION, SOLUTION INTRAMUSCULAR; INTRAVENOUS; SUBCUTANEOUS
Status: DISCONTINUED | OUTPATIENT
Start: 2023-11-13 | End: 2023-11-16 | Stop reason: HOSPADM

## 2023-11-13 RX ORDER — MAGNESIUM HYDROXIDE/ALUMINUM HYDROXICE/SIMETHICONE 120; 1200; 1200 MG/30ML; MG/30ML; MG/30ML
30 SUSPENSION ORAL DAILY PRN
Status: DISCONTINUED | OUTPATIENT
Start: 2023-11-13 | End: 2023-11-16 | Stop reason: HOSPADM

## 2023-11-13 RX ORDER — LORAZEPAM 0.5 MG/1
0.5 TABLET ORAL DAILY PRN
Status: DISCONTINUED | OUTPATIENT
Start: 2023-11-13 | End: 2023-11-13

## 2023-11-13 RX ORDER — BISACODYL 10 MG
10 SUPPOSITORY, RECTAL RECTAL DAILY PRN
Status: DISCONTINUED | OUTPATIENT
Start: 2023-11-13 | End: 2023-11-16 | Stop reason: HOSPADM

## 2023-11-13 RX ORDER — GABAPENTIN 100 MG/1
100 CAPSULE ORAL 3 TIMES DAILY
Status: DISCONTINUED | OUTPATIENT
Start: 2023-11-13 | End: 2023-11-16 | Stop reason: HOSPADM

## 2023-11-13 RX ORDER — RIVASTIGMINE TARTRATE 1.5 MG/1
1.5 CAPSULE ORAL 2 TIMES DAILY
Status: DISCONTINUED | OUTPATIENT
Start: 2023-11-13 | End: 2023-11-16 | Stop reason: HOSPADM

## 2023-11-13 RX ORDER — DULOXETIN HYDROCHLORIDE 60 MG/1
60 CAPSULE, DELAYED RELEASE ORAL DAILY
Status: DISCONTINUED | OUTPATIENT
Start: 2023-11-13 | End: 2023-11-16 | Stop reason: HOSPADM

## 2023-11-13 RX ORDER — POLYETHYLENE GLYCOL 3350 17 G/17G
17 POWDER, FOR SOLUTION ORAL DAILY PRN
Status: DISCONTINUED | OUTPATIENT
Start: 2023-11-13 | End: 2023-11-16 | Stop reason: HOSPADM

## 2023-11-13 RX ORDER — GADOBUTROL 604.72 MG/ML
8 INJECTION INTRAVENOUS ONCE
Status: COMPLETED | OUTPATIENT
Start: 2023-11-13 | End: 2023-11-13

## 2023-11-13 RX ORDER — ACETAMINOPHEN 325 MG/1
975 TABLET ORAL EVERY 8 HOURS
Qty: 27 TABLET | Refills: 0 | Status: SHIPPED | OUTPATIENT
Start: 2023-11-13 | End: 2023-11-16

## 2023-11-13 RX ORDER — DIVALPROEX SODIUM 125 MG/1
125 TABLET, DELAYED RELEASE ORAL 2 TIMES DAILY
Status: DISCONTINUED | OUTPATIENT
Start: 2023-11-13 | End: 2023-11-13

## 2023-11-13 RX ORDER — TRAZODONE HYDROCHLORIDE 50 MG/1
50 TABLET, FILM COATED ORAL AT BEDTIME
Status: DISCONTINUED | OUTPATIENT
Start: 2023-11-13 | End: 2023-11-16 | Stop reason: HOSPADM

## 2023-11-13 RX ORDER — CARBIDOPA AND LEVODOPA 25; 100 MG/1; MG/1
2 TABLET ORAL 3 TIMES DAILY
Status: DISCONTINUED | OUTPATIENT
Start: 2023-11-13 | End: 2023-11-13

## 2023-11-13 RX ORDER — MEMANTINE HYDROCHLORIDE 5 MG/1
5 TABLET ORAL 2 TIMES DAILY
Status: DISCONTINUED | OUTPATIENT
Start: 2023-11-13 | End: 2023-11-16 | Stop reason: HOSPADM

## 2023-11-13 RX ADMIN — MEMANTINE 5 MG: 5 TABLET ORAL at 20:25

## 2023-11-13 RX ADMIN — CARBIDOPA AND LEVODOPA 2 TABLET: 25; 100 TABLET ORAL at 14:28

## 2023-11-13 RX ADMIN — GABAPENTIN 100 MG: 100 CAPSULE ORAL at 14:28

## 2023-11-13 RX ADMIN — ACETAMINOPHEN 975 MG: 325 TABLET, FILM COATED ORAL at 14:31

## 2023-11-13 RX ADMIN — RIVASTIGMINE TARTRATE 1.5 MG: 1.5 CAPSULE ORAL at 21:22

## 2023-11-13 RX ADMIN — LORAZEPAM 0.25 MG: 0.5 TABLET ORAL at 21:22

## 2023-11-13 RX ADMIN — HYDROMORPHONE HYDROCHLORIDE 0.2 MG: 0.2 INJECTION, SOLUTION INTRAMUSCULAR; INTRAVENOUS; SUBCUTANEOUS at 20:26

## 2023-11-13 RX ADMIN — HALOPERIDOL LACTATE 2 MG: 5 INJECTION, SOLUTION INTRAMUSCULAR at 16:49

## 2023-11-13 RX ADMIN — CARBIDOPA AND LEVODOPA 2 TABLET: 25; 100 TABLET ORAL at 16:50

## 2023-11-13 RX ADMIN — Medication 50 MCG: at 14:30

## 2023-11-13 RX ADMIN — LORAZEPAM 0.5 MG: 2 INJECTION INTRAMUSCULAR; INTRAVENOUS at 08:37

## 2023-11-13 RX ADMIN — GABAPENTIN 100 MG: 100 CAPSULE ORAL at 20:25

## 2023-11-13 RX ADMIN — SENNOSIDES AND DOCUSATE SODIUM 1 TABLET: 8.6; 5 TABLET ORAL at 20:25

## 2023-11-13 RX ADMIN — HYDROMORPHONE HYDROCHLORIDE 0.2 MG: 0.2 INJECTION, SOLUTION INTRAMUSCULAR; INTRAVENOUS; SUBCUTANEOUS at 07:05

## 2023-11-13 RX ADMIN — ENOXAPARIN SODIUM 40 MG: 40 INJECTION SUBCUTANEOUS at 12:07

## 2023-11-13 RX ADMIN — GADOBUTROL 8 ML: 604.72 INJECTION INTRAVENOUS at 09:38

## 2023-11-13 RX ADMIN — TRAZODONE HYDROCHLORIDE 50 MG: 50 TABLET ORAL at 21:22

## 2023-11-13 RX ADMIN — LABETALOL HYDROCHLORIDE 10 MG: 5 INJECTION, SOLUTION INTRAVENOUS at 08:45

## 2023-11-13 RX ADMIN — ACETAMINOPHEN 975 MG: 325 TABLET, FILM COATED ORAL at 20:25

## 2023-11-13 RX ADMIN — DULOXETINE HYDROCHLORIDE 60 MG: 60 CAPSULE, DELAYED RELEASE ORAL at 14:27

## 2023-11-13 ASSESSMENT — ACTIVITIES OF DAILY LIVING (ADL)
ADLS_ACUITY_SCORE: 49

## 2023-11-13 NOTE — PROGRESS NOTES
Nurse requesting ok to given nighttime meds with sip of water.  It seems like this ok was given by the daytime doctor and I do not see that there were any reported complications.     Bedside nurse will try bedside swallow eval again.  If no complication with this, I think it would be ok to take medications one at a time by mouth.     Omi Quiros, DO

## 2023-11-13 NOTE — PLAN OF CARE
Pt is NPO no exception. Pt is agitated and screaming. This writer paged Hospitalist and asked to do swallow study. Pt not able to swallow. Pt was given Haldol and Dilaudid IV. Pt is calm and sleeping in bed. Will continue to montrior and assess pt.

## 2023-11-13 NOTE — PLAN OF CARE
Goal Outcome Evaluation:      Plan of Care Reviewed With: patient, spouse    Overall Patient Progress: no change    Pt disorientated to place, time, and situation. Calm & cooperative this afternoon.  @ bedside. Pt became agitated around 1500, trying to get out bed, yelling @ staff, refusing for staff to help her. IV ativan given @ 1530. Pt was able to fall asleep after this. Pt then became agitated again after this writer changed hip dressing, as pt had pulled this off. Paged and gave another 1x dose of IV ativan @ 1815. Pt still continuing to yell out, needing a lot of re direction. Pt was able to fall asleep around 1930.     VS stable; afebrile. Requiring 2L O2-oxymask, unable to wean. Pt denies pain @ rest but expressing pain w/ activity. Refusing to take tylenol, also NPO. CMS intact. Dressing changed x2 as pt was pulling this off. Up w/ Ax2, using gait belt, and magy steady. Voiding in good amts-using purewick for incontinence. Remains NPO until MRI can be completed to rule out stroke per MD.

## 2023-11-13 NOTE — PROGRESS NOTES
SPIRITUAL HEALTH SERVICES - Progress Note  [facility]    Referral Source: Responded to a triage request for family support for pt's spouse.    Pt's spouse Dimitry was at the bedside.  Pt was resting.  Introduced Dimitry to [facility].  He deferred  support at this time.  Dimitry shared that he is planning to ask one of their parish priests to anoint pt.  He declined my offer to assist him with contacting [facility] ([facility] and [facility]).  Dimitry reported that their granddaughter, who is a nurse, plans to visit later today.    Plan: Informed pt's spouse how he can request  support in case his needs change.  This author and other chaplains remain available per pt/family request.     Alexander Saucedo M.Div., [facility]  Staff     SHS available 24/7 for emergent requests/referrals, either by paging the on-call  or by entering an ASAP/STAT consult in [facility], which will also page the on-call .

## 2023-11-13 NOTE — PROGRESS NOTES
Orthopedic Surgery  David Hogan  11/13/2023     Admit Date:  11/10/2023    POD: 2 Days Post-Op   Procedure(s):  Surgical treatment of right intertrochanteric femur fracture with cephalomedullary device     Patient resting in bed.     Continues to be confused with garbled speech.      Temp:  [96.8  F (36  C)-98.1  F (36.7  C)] 96.8  F (36  C)  Pulse:  [] 95  Resp:  [20-28] 20  BP: (123-188)/(47-68) 153/61  SpO2:  [94 %-99 %] 94 %    Dementia at baseline.   Dressing is clean, dry, and intact.   Moderate right thigh swelling.   Bilateral calves are soft, non-tender.  Right lower extremity is NVI.  Sensation intact bilateral lower extremities  Patient does move her ankles.   +Dp pulse    Labs:  Recent Labs   Lab Test 11/13/23  0706 11/12/23  0722 11/11/23  0517   WBC 10.6 12.6* 11.0   HGB 8.8* 10.0* 11.7    321 367     Recent Labs   Lab Test 11/11/23  0517 10/10/20  1407   INR 0.92 0.95       PLAN:   Continue lovenox in house, ASA 81mg bid at discharge for DVT prophylaxis.     Mobilize with PT/OT    WBAT right LE with walker.     Continue current pain regiment.   Dressings: Keep intact.  Change if >60% saturated or peeling off.    Follow-up: 2 weeks post-op with Dr Kemp team    DISPO:    Anticipate d/c to home (memory care) when medically cleared and progressing in PT.    Marie Horner PA-C

## 2023-11-13 NOTE — PROGRESS NOTES
CLINICAL SWALLOW EVALUATION       11/13/23 1679   Appointment Info   Signing Clinician's Name / Credentials (SLP) Cassandra Chase M.A., CCC-SLP, [facility]   General Information   Onset of Illness/Injury or Date of Surgery 11/10/23   Referring Physician Dr. Dean   Patient/Family Therapy Goal Statement (SLP) Patient would like to eat Chinese food.   Pertinent History of Current Problem Patient admitted with hip fracture after fall at [facility], s/p surgery.  There was concern for new stroke due to garbled speech and facial weakness, though this was ruled out.  Her PMH is significant for myotinic dystrophy and Parkinson's disease, dementia.   Type of Evaluation   Type of Evaluation Swallow Evaluation   Oral Motor   Oral Musculature unable to assess due to poor participation/comprehension   Structural Abnormalities none present   Dentition (Oral Motor)   Dentition (Oral Motor) dental appliance/dentures;edentulous   Tongue Function (Oral Motor)   Comment, Tongue Function (Oral Motor) Not following most commands at start of evaluation, tongue function improved with moistened oral cavity and as alertness improved.   Vocal Quality/Secretion Management (Oral Motor)   Vocal Quality (Oral Motor) dysphonic;hypophonic   General Swallowing Observations   Past History of Dysphagia None known per EMR, unable to report   Comment, Secretions/Suctioning 2L   Respiratory Support nasal cannula   Current Diet/Method of Nutritional Intake (General Swallowing Observations, NIS) NPO   Swallowing Evaluation Clinical swallow evaluation   Clinical Swallow Evaluation   Feeding Assistance frequent cues/help required   Additional evaluation(s) completed today No   Clinical Swallow Evaluation Textures Trialed thin liquids;mildly thick liquids;pureed;solid foods;soft & bite-sized   Clinical Swallow Eval: Thin Liquid Texture Trial   Mode of Presentation, Thin Liquids cup;spoon;self-fed;fed by clinician   Volume of Liquid or Food Presented 4 tsp    Oral Phase of Swallow delayed AP movement;premature pharyngeal entry   Pharyngeal Phase of Swallow impaired;repeated swallows   Diagnostic Statement Suspect premature entry with delayed swallow, increased aspiration risk with feeding   Clinical Swallow Eval: Mildly Thick Liquids   Mode of Presentation spoon;cup;self-fed;fed by clinician   Volume Presented 4 oz   Oral Phase delayed AP movement   Pharyngeal Phase intact   Diagnostic Statement No overt Sx of aspiration   Clinical Swallow Evaluation: Puree Solid Texture Trial   Mode of Presentation, Puree spoon;self-fed;fed by clinician   Volume of Puree Presented 4 oz   Oral Phase, Puree WFL   Pharyngeal Phase, Puree intact   Diagnostic Statement Used as pill carrier, as well. No overt Sx of aspiration.   Clinical Swallow Eval: Soft & Bite Sized   Mode of Presentation spoon;fed by clinician   Volume Presented 1 tsp   Oral Phase effortful AP movement   Pharyngeal Phase intact   Diagnostic Statement No significant oral residue or overt Sx of aspiration   Clinical Swallow Evaluation: Solid Food Texture Trial   Mode of Presentation self-fed   Volume Presented 1 cracker (2 bites)   Oral Phase impaired mastication;delayed AP movement;effortful AP movement;residue in oral cavity   Oral Residue left anterior lateral sulci;mid posterior tongue;right anterior lateral sulci   Pharyngeal Phase intact   Diagnostic Statement Slow mastication, difficult bolus formation with dry solid.  Oral residue requiring liquid wash (assisted) for clearance.   Swallowing Recommendations   Diet Consistency Recommendations soft & bite-sized (level 6);mildly thick liquids (level 2)   Supervision Level for Intake 1:1 supervision needed   Mode of Delivery Recommendations bolus size, small   Swallowing Maneuver Recommendations alternate food and liquid intake   Monitoring/Assistance Required (Eating/Swallowing) monitor for cough or change in vocal quality with intake;stop eating activities when  fatigue is present;check mouth frequently for oral residue/pocketing   Recommended Feeding/Eating Techniques (Swallow Eval) maintain upright sitting position for eating;maintain upright posture during/after eating for 30 minutes;provide assist with feeding;provide oral hygiene prior to intake;other (see comments);encourage use of dentures   Medication Administration Recommendations, Swallowing (SLP) Whole single pills with puree carrier   Clinical Impression   Criteria for Skilled Therapeutic Interventions Met (SLP Eval) Yes, treatment indicated   SLP Diagnosis Mild to moderate oropharyngeal dysphagia and aspiration risks   Risks & Benefits of therapy have been explained evaluation/treatment results reviewed;risks/benefits reviewed;patient;caregiver   SLP Total Evaluation Time   Eval: oral/pharyngeal swallow function, clinical swallow Minutes (65648) 35   SLP Goals   Therapy Frequency (SLP Eval) 4 times/week   SLP Predicted Duration/Target Date for Goal Attainment 11/20/23   SLP Goals Swallow   SLP: Safely tolerate diet without signs/symptoms of aspiration Easy to chew diet;Thin liquids;With use of swallow precautions;With use of compensatory swallow strategies;With assistance/supervision   SLP Discharge Planning   SLP Plan Meal follow up, thin liquid trials.   SLP Discharge Recommendation extended care facility   SLP Rationale for DC Rec Recommend home SLP services at [facility] at discharge, pending swallowing progress.  Patient currrently below suspected baseline for both communication and swallowing.   SLP Brief overview of current status  Clinical swallow evaluation: recommend initiate soft and bite size diet with mildly thick liquids.  Eat only when alert, upright to chair for meals as able, dentures in, facilitate and supervise self-feeding, as able.  Whole pills in applesauce carrier recommended.   Total Session Time   Total Session Time (sum of timed and untimed services) 35

## 2023-11-13 NOTE — PLAN OF CARE
Patient vital signs are at baseline: Yes  Patient able to ambulate as they were prior to admission or with assist devices provided by therapies during their stay:  Yes  Patient MUST void prior to discharge:  Yes  Patient able to tolerate oral intake:  Yes  Pain has adequate pain control using Oral analgesics:  Yes  Does patient have an identified :  Yes  Has goal D/C date and time been discussed with patient:  Yes         Pt is alert to self with confusion. Pt was given Dilaudid for pain and Haldol for agitation. VSS. Pt is on bedrest. Pt is post op. Surgical dressing clean, dry and intact. CMS intact. Pt is NPO no exception. Pt was given her scheduled ancef during the shift. Pt is on 04 4L oxymask. Pt is sleeping in bed. Bed alarm in place and call light within each. Will continue to monitor and assess pt.

## 2023-11-13 NOTE — PROGRESS NOTES
Paged that patient having some increased agitation. Trying to get out of bed.  Will order one time dose of 0.5 mg IV ativan.  She does take lorazepam prn for agitation at baseline.    Gurjit Tillman MD

## 2023-11-14 ENCOUNTER — APPOINTMENT (OUTPATIENT)
Dept: SPEECH THERAPY | Facility: CLINIC | Age: 78
DRG: 481 | End: 2023-11-14
Payer: MEDICARE

## 2023-11-14 ENCOUNTER — APPOINTMENT (OUTPATIENT)
Dept: PHYSICAL THERAPY | Facility: CLINIC | Age: 78
DRG: 481 | End: 2023-11-14
Payer: MEDICARE

## 2023-11-14 ENCOUNTER — APPOINTMENT (OUTPATIENT)
Dept: GENERAL RADIOLOGY | Facility: CLINIC | Age: 78
DRG: 481 | End: 2023-11-14
Attending: INTERNAL MEDICINE
Payer: MEDICARE

## 2023-11-14 LAB
ALBUMIN SERPL BCG-MCNC: 3.3 G/DL (ref 3.5–5.2)
ALP SERPL-CCNC: 80 U/L (ref 40–150)
ALT SERPL W P-5'-P-CCNC: 8 U/L (ref 0–50)
ANION GAP SERPL CALCULATED.3IONS-SCNC: 8 MMOL/L (ref 7–15)
AST SERPL W P-5'-P-CCNC: 30 U/L (ref 0–45)
BILIRUB DIRECT SERPL-MCNC: <0.2 MG/DL (ref 0–0.3)
BILIRUB SERPL-MCNC: 0.3 MG/DL
BUN SERPL-MCNC: 21.1 MG/DL (ref 8–23)
CALCIUM SERPL-MCNC: 8.7 MG/DL (ref 8.8–10.2)
CHLORIDE SERPL-SCNC: 100 MMOL/L (ref 98–107)
CREAT SERPL-MCNC: 0.44 MG/DL (ref 0.51–0.95)
DEPRECATED HCO3 PLAS-SCNC: 30 MMOL/L (ref 22–29)
EGFRCR SERPLBLD CKD-EPI 2021: >90 ML/MIN/1.73M2
ERYTHROCYTE [DISTWIDTH] IN BLOOD BY AUTOMATED COUNT: 14.4 % (ref 10–15)
GLUCOSE SERPL-MCNC: 115 MG/DL (ref 70–99)
HCT VFR BLD AUTO: 28.8 % (ref 35–47)
HGB BLD-MCNC: 9 G/DL (ref 11.7–15.7)
MCH RBC QN AUTO: 28.7 PG (ref 26.5–33)
MCHC RBC AUTO-ENTMCNC: 31.3 G/DL (ref 31.5–36.5)
MCV RBC AUTO: 92 FL (ref 78–100)
PLATELET # BLD AUTO: 312 10E3/UL (ref 150–450)
POTASSIUM SERPL-SCNC: 4.1 MMOL/L (ref 3.4–5.3)
PROT SERPL-MCNC: 6.1 G/DL (ref 6.4–8.3)
RBC # BLD AUTO: 3.14 10E6/UL (ref 3.8–5.2)
SODIUM SERPL-SCNC: 138 MMOL/L (ref 135–145)
WBC # BLD AUTO: 10.6 10E3/UL (ref 4–11)

## 2023-11-14 PROCEDURE — 250N000011 HC RX IP 250 OP 636: Mod: JZ

## 2023-11-14 PROCEDURE — 250N000013 HC RX MED GY IP 250 OP 250 PS 637: Performed by: PSYCHIATRY & NEUROLOGY

## 2023-11-14 PROCEDURE — 92526 ORAL FUNCTION THERAPY: CPT | Mod: GN

## 2023-11-14 PROCEDURE — 82248 BILIRUBIN DIRECT: CPT | Performed by: INTERNAL MEDICINE

## 2023-11-14 PROCEDURE — 250N000013 HC RX MED GY IP 250 OP 250 PS 637: Performed by: INTERNAL MEDICINE

## 2023-11-14 PROCEDURE — 36415 COLL VENOUS BLD VENIPUNCTURE: CPT | Performed by: HOSPITALIST

## 2023-11-14 PROCEDURE — 250N000013 HC RX MED GY IP 250 OP 250 PS 637

## 2023-11-14 PROCEDURE — 99233 SBSQ HOSP IP/OBS HIGH 50: CPT | Performed by: INTERNAL MEDICINE

## 2023-11-14 PROCEDURE — 97530 THERAPEUTIC ACTIVITIES: CPT | Mod: GP

## 2023-11-14 PROCEDURE — 120N000001 HC R&B MED SURG/OB

## 2023-11-14 PROCEDURE — 74019 RADEX ABDOMEN 2 VIEWS: CPT

## 2023-11-14 PROCEDURE — 80053 COMPREHEN METABOLIC PANEL: CPT | Performed by: HOSPITALIST

## 2023-11-14 PROCEDURE — 85027 COMPLETE CBC AUTOMATED: CPT | Performed by: HOSPITALIST

## 2023-11-14 RX ORDER — LACTULOSE 10 G/15ML
20 SOLUTION ORAL ONCE
Status: COMPLETED | OUTPATIENT
Start: 2023-11-14 | End: 2023-11-14

## 2023-11-14 RX ADMIN — MAGNESIUM HYDROXIDE 30 ML: 400 SUSPENSION ORAL at 16:16

## 2023-11-14 RX ADMIN — LORAZEPAM 0.25 MG: 0.5 TABLET ORAL at 22:33

## 2023-11-14 RX ADMIN — Medication 50 MCG: at 07:42

## 2023-11-14 RX ADMIN — OXYCODONE HYDROCHLORIDE 2.5 MG: 5 TABLET ORAL at 17:59

## 2023-11-14 RX ADMIN — ENOXAPARIN SODIUM 40 MG: 40 INJECTION SUBCUTANEOUS at 11:43

## 2023-11-14 RX ADMIN — RIVASTIGMINE TARTRATE 1.5 MG: 1.5 CAPSULE ORAL at 22:34

## 2023-11-14 RX ADMIN — MEMANTINE 5 MG: 5 TABLET ORAL at 22:33

## 2023-11-14 RX ADMIN — TRAZODONE HYDROCHLORIDE 50 MG: 50 TABLET ORAL at 22:34

## 2023-11-14 RX ADMIN — MEMANTINE 5 MG: 5 TABLET ORAL at 07:42

## 2023-11-14 RX ADMIN — SENNOSIDES AND DOCUSATE SODIUM 1 TABLET: 8.6; 5 TABLET ORAL at 07:43

## 2023-11-14 RX ADMIN — RIVASTIGMINE TARTRATE 1.5 MG: 1.5 CAPSULE ORAL at 07:44

## 2023-11-14 RX ADMIN — LACTULOSE 20 G: 20 SOLUTION ORAL at 12:52

## 2023-11-14 RX ADMIN — GABAPENTIN 100 MG: 100 CAPSULE ORAL at 22:33

## 2023-11-14 RX ADMIN — OXYCODONE HYDROCHLORIDE 2.5 MG: 5 TABLET ORAL at 13:23

## 2023-11-14 RX ADMIN — GABAPENTIN 100 MG: 100 CAPSULE ORAL at 07:42

## 2023-11-14 RX ADMIN — CARBIDOPA AND LEVODOPA 1 TABLET: 25; 100 TABLET ORAL at 11:43

## 2023-11-14 RX ADMIN — CARBIDOPA AND LEVODOPA 1 TABLET: 25; 100 TABLET ORAL at 07:41

## 2023-11-14 RX ADMIN — CARBIDOPA AND LEVODOPA 1 TABLET: 25; 100 TABLET ORAL at 16:16

## 2023-11-14 RX ADMIN — Medication 1 MG: at 03:00

## 2023-11-14 RX ADMIN — GABAPENTIN 100 MG: 100 CAPSULE ORAL at 13:23

## 2023-11-14 RX ADMIN — POLYETHYLENE GLYCOL 3350 17 G: 17 POWDER, FOR SOLUTION ORAL at 07:42

## 2023-11-14 RX ADMIN — DULOXETINE HYDROCHLORIDE 60 MG: 60 CAPSULE, DELAYED RELEASE ORAL at 07:41

## 2023-11-14 ASSESSMENT — ACTIVITIES OF DAILY LIVING (ADL)
ADLS_ACUITY_SCORE: 49
ADLS_ACUITY_SCORE: 47
ADLS_ACUITY_SCORE: 49
ADLS_ACUITY_SCORE: 47

## 2023-11-14 NOTE — PROGRESS NOTES
Orthopedic Surgery  David Hogan  11/14/2023     Admit Date:  11/10/2023    POD: 3 Days Post-Op   Procedure(s):  Surgical treatment of right intertrochanteric femur fracture with cephalomedullary device     Patient resting in bed.     More alert today.  Able to answer questions.   Peeled off her distal dressing overnight.     Temp:  [97.6  F (36.4  C)-97.8  F (36.6  C)] 97.6  F (36.4  C)  Pulse:  [83-94] 94  Resp:  [16-20] 16  BP: (141-172)/(70-83) 172/83  SpO2:  [88 %-98 %] 94 %    Dementia at baseline.   Distal dressing is changed to aquacel today.   Moderate right thigh swelling.   Bilateral calves are soft, non-tender.  Right lower extremity is NVI.  Sensation intact bilateral lower extremities  Patient able to resist dorsi and plantar flexion bilaterally  +Dp pulse    Labs:  Recent Labs   Lab Test 11/14/23  0659 11/13/23  0706 11/12/23  0722   WBC 10.6 10.6 12.6*   HGB 9.0* 8.8* 10.0*    304 321     Recent Labs   Lab Test 11/11/23  0517 10/10/20  1407   INR 0.92 0.95       PLAN:   Continue lovenox in house, ASA 81mg bid at discharge for DVT prophylaxis.     Mobilize with PT/OT    WBAT right LE with walker.     Continue current pain regiment.   Dressings: Keep intact.  Change if >60% saturated or peeling off.    Follow-up: 2 weeks post-op with Dr Kemp team    DISPO:    Anticipate d/c to home (memory care) when medically cleared and progressing in PT.  Ortho stable.     Marie Horner PA-C

## 2023-11-14 NOTE — PLAN OF CARE
Patient vital signs are at baseline: No,  Reason:  needs O2 2L to keep above 90%  Patient able to ambulate as they were prior to admission or with assist devices provided by therapies during their stay:  No,  Reason:  pt is magy steady with assist of 2, stayed in bed during night  Patient MUST void prior to discharge:  Yes  Patient able to tolerate oral intake:  Yes  Pain has adequate pain control using Oral analgesics:  No,  Reason:  IV Dilaudid given x1  Does patient have an identified :  Yes  Has goal D/C date and time been discussed with patient:  No,  Reason:  TBD    Pt is alert to self only, agitated at times, trying to get out of bed, pulled IV out, removed lower dressing x1, replaced. Pt is incontinent, pure wick used overnight. Pt was screaming in pain after check and change, IV Dilaudid given x1 around 8 pm. Pt took all of her meds whole with apple sauce. On mildly thick liquids. Pt slept most of the night after scheduled Trazodone, scheduled Ativan. Prn Melatonin.

## 2023-11-14 NOTE — CONSULTS
Care Management Initial Consult    General Information  Assessment completed with: Patient, Spouse or significant other,    Type of CM/SW Visit: Initial Assessment    Primary Care Provider verified and updated as needed: Yes   Readmission within the last 30 days: no previous admission in last 30 days      Reason for Consult: discharge planning  Advance Care Planning:            Communication Assessment  Patient's communication style: spoken language (English or Bilingual)    Hearing Difficulty or Deaf: no   Wear Glasses or Blind: yes    Cognitive  Cognitive/Neuro/Behavioral: .WDL except, orientation, speech  Level of Consciousness: confused, alert  Arousal Level: opens eyes spontaneously  Orientation: disoriented to, place, time, situation  Mood/Behavior: calm  Best Language: 0 - No aphasia  Speech: clear    Living Environment:   People in home: facility resident     Current living Arrangements: assisted living, other (see comments) (Memory Care)  Name of Facility: [facility]   Able to return to prior arrangements: yes       Family/Social Support:  Care provided by: other (see comments) (facilty staff)  Provides care for: no one, unable/limited ability to care for self  Marital Status:             Description of Support System: Supportive, Involved    Support Assessment: Adequate family and caregiver support, Adequate social supports    Current Resources:   Patient receiving home care services: No     Community Resources:    Equipment currently used at home: wheelchair, manual  Supplies currently used at home:      Does the patient's insurance plan have a 3 day qualifying hospital stay waiver?  No    Lifestyle & Psychosocial Needs:  Social Determinants of Health     Food Insecurity: Not on file   Depression: Not on file   Housing Stability: Not on file   Tobacco Use: Medium Risk (11/11/2023)    Patient History     Smoking Tobacco Use: Former     Smokeless Tobacco Use: Never     Passive  Exposure: Not on file   Financial Resource Strain: Not on file   Alcohol Use: Not on file   Transportation Needs: Not on file   Physical Activity: Not on file   Interpersonal Safety: Not on file   Stress: Not on file   Social Connections: Not on file       Additional Information:  Care management consult for discharge planning/disposition and elevated risk score of 20%. Patient admitted from [facility] for a right femur fracture following a fall. She underwent surgery on 11/11. Met with patient and spouse at bedside. Discussed that [facility] cannot do assist of 2 transfers and typically only use a dutch with hospice patients. The recommendation by PT would be TCU if her memory care cannot support the assist of 2. Spouse verbalizes understanding. She has been to [facility] with her previous hip fracture. Will send initial referrals to [facility], [facility], [facility], [facility] and [facility].   Transportation will be wheelchair. Reviewed out of pocket cost for [facility] transport, $89.98 base and $5.79 per mile to the destination. Spoke with spouse, they expressed understanding and are agreeable to this.    Spoke with Karly at [facility], her number [phone].    Care coordination to follow for discharge planning.     Anabelle Causey RN  Care Coordinator  [facility]

## 2023-11-14 NOTE — PLAN OF CARE
Goal Outcome Evaluation:    Patient vital signs are at baseline: No,  Reason:  needs O2 2L to keep above 90%  Patient able to ambulate as they were prior to admission or with assist devices provided by therapies during their stay:  No,  Reason:  lift with assist of 2  Patient MUST void prior to discharge:  Yes  Patient able to tolerate oral intake:  Yes  Pain has adequate pain control using Oral analgesics:  No,  Reason:  Iyes  Does patient have an identified :  Yes  Has goal D/C date and time been discussed with patient:  No,  Reason:  TBD     Patient is alert to self only. VSS, On 2 L oxygen. On Level 6 soft, bite size with level 2 mildly thickened liquids. Patient is incontinent, pure wick on. took all of her meds whole with apple sauce. Scheduled bowel meds given, On scheduled lactulose. Xray to abdomen ordered. Will continue monitoring. Discharge plan TCU OR [facility].    Patient has poor appetite. WBAT right LE. Scheduled lactulose given. PRN oxycodone, and scheduled gabapentin given for pain. Dressing to right hip- changed by ortho today. Her  is at bedside during lunch and help in feeding. Bladder scan 0 ml, pt voided. Will continue monitoring.

## 2023-11-14 NOTE — PROGRESS NOTES
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     She underwent ORIF on 11/11    Brief hospitalization notable for acute neurologic changes resulting in RRT->Code stroke due to new right facial droop/garbled speech, of note had recently received opioids for pain.  Stroke eval negative, symptoms have improved      Problem List:   Right intertrochanteric hip fx  S/p ORIF on 11/11-appreciate ortho assistance   Has apap and low dose narcotics available for pain control       Acute neurological changes with facial droop/garbled speech   Stroke w/up is negative.  Sx persist.  D/w  who states this has been going on for the past 3-4 weeks.  Interestingly she was started on lorazepam 0.5mg at bedtime scheduled with an additional dose every day prn.  Then she was having issues with behavior so started on depakote about 10 days ago.  In any case her symptoms are much worse.  When I see her speech is very garbled at times I can't even understand what she is saying.  RN is concerned about her ability to swallow as well  ? Med effect, ? 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  Parkinson's dz  Cont pta carbi/levodopa  2 tabs tid  Cont with pta prednisone 25 mg twice weekly (M-Th)    Htn/orthostatic hypotension   She's on no vasoactive medications    Dementia with anxiety   Cont pta lorazepam at bedtime and daily prn as well as trazodone at night time    hyponatremia  Mild intermittent and chronic  Appears to be at baseline       Covid   S/p 3 shot moderna series 12/2021    DVT Prophylaxis: Enoxaparin (Lovenox) SQ  Code Status: DNR / DNI  Functional Status:  Diet:  Riddle:  Access    Disposition Plan   Expected discharge in tbd days to  [facility]  once above sorted out .     Entered: Hina Dean MD 11/14/2023, 11:30 AM       Securely message with [employer] (more info)  Text page via [employer]     Discussed with  by phone today    I spent 45 minutes reviewing epic including prior labs/imaging/medical history and notes related to this encounter  In addition time was spent in interveiwing the patient, communicating with contacts, and medical decision making      Interval History (Subjective):      She wants to get out of here.  Basically just tells me to leave                   Physical Exam:      Last Vital Signs:  BP (!) 172/83 (BP Location: Left arm, Patient Position: Semi-Turner's, Cuff Size: Adult Regular)   Pulse 94   Temp 97.6  F (36.4  C) (Temporal)   Resp 16   Wt 79 kg (174 lb 2.6 oz)   SpO2 94%   Breastfeeding No   BMI 29.90 kg/m      I/O:  Speech is clear today, facial droop appears resolved as well  Lungs ctab nl effort rrr no mrg no le edema skin warm and dry no cyanosis or clubbing alert, oriented to self, cooperative with cares            Medications:      All current medications were reviewed with changes reflected in problem list.         Data:      All new lab and imaging data was reviewed.   Labs:  Recent Labs   Lab 11/14/23  0659      POTASSIUM 4.1   CHLORIDE 100   CO2 30*   ANIONGAP 8   *   BUN 21.1   CR 0.44*  "  GFRESTIMATED >90   ANN-MARIE 8.7*     Recent Labs   Lab 11/14/23  0659   WBC 10.6   HGB 9.0*   HCT 28.8*   MCV 92        Recent Labs   Lab 11/14/23  0659 11/13/23  0706 11/12/23  0835 11/12/23  0722 11/11/23  0517   * 86  86 103* 109*  109* 106*     No results for input(s): \"AST\", \"ALT\", \"GGT\", \"ALKPHOS\", \"BILITOTAL\", \"BILICONJ\", \"BILIDIRECT\", \"ADAMA\" in the last 168 hours.    Invalid input(s): \"BILIRUBININDIRECT\"   Imaging:   Results for orders placed or performed during the hospital encounter of 11/10/23   CT Chest/Abdomen/Pelvis w Contrast    Narrative    EXAM: CT CHEST/ABDOMEN/PELVIS W CONTRAST  LOCATION: [facility]  DATE: 11/11/2023    INDICATION: fall, diffuse chest tenderness. right sided abdominal pain and hip pain  COMPARISON: None.  TECHNIQUE: CT scan of the chest, abdomen, and pelvis was performed following injection of IV contrast. Multiplanar reformats were obtained. Dose reduction techniques were used.   CONTRAST: 60mL Isovue 370    FINDINGS:   LUNGS AND PLEURA: Negative, no pleural effusion or pneumothorax.    MEDIASTINUM/AXILLAE: Normal.    CORONARY ARTERY CALCIFICATION: Mild.    HEPATOBILIARY: Tiny hepatic cysts. No traumatic lesion evident.    PANCREAS: Normal.    SPLEEN: Normal.    ADRENAL GLANDS: Fullness of both adrenals suggesting small bilateral benign adenomas.    KIDNEYS/BLADDER: Normal.    BOWEL: No obstruction or inflammatory change. No ascites.    LYMPH NODES: Normal.    VASCULATURE: Moderate atherosclerotic plaque.    PELVIC ORGANS: Normal.    MUSCULOSKELETAL: Prior left hip ORIF. Spinal fusion.  Acute intertrochanteric fracture right hip.      Impression    IMPRESSION:  1.  Acute appearing right intertrochanteric hip fracture.  2.   No other acute/traumatic abnormality identified.   XR Femur Right 2 Views    Narrative    EXAM: XR FEMUR RIGHT 2 VIEWS  LOCATION: [facility]  DATE: 11/11/2023    INDICATION: Right lower extremity " pain.  COMPARISON: X-ray pelvis single view 10/10/2020 at 1206 hours.      Impression    IMPRESSION: Interval development of acute comminuted proximal right femoral fracture, with varus deformity. Right femoral head is located in the acetabulum. Degenerative changes right hip and knee joint.   CT Head w/o Contrast    Narrative    EXAM: CT HEAD W/O CONTRAST, CT CERVICAL SPINE W/O CONTRAST  LOCATION: [facility]  DATE: 11/11/2023    INDICATION: Fall, head trauma  COMPARISON: None.  TECHNIQUE:   1) Routine CT Head without IV contrast. Multiplanar reformats. Dose reduction techniques were used.  2) Routine CT Cervical Spine without IV contrast. Multiplanar reformats. Dose reduction techniques were used.    FINDINGS:   HEAD CT:   INTRACRANIAL CONTENTS: No intracranial hemorrhage, extraaxial collection, or mass effect.  No CT evidence of acute infarct. Mild to moderate presumed chronic small vessel ischemic changes. Mild generalized volume loss. No hydrocephalus.     VISUALIZED ORBITS/SINUSES/MASTOIDS: No intraorbital abnormality. No significant paranasal sinus mucosal disease. No middle ear or mastoid effusion.    BONES/SOFT TISSUES: No acute abnormality.    CERVICAL SPINE CT:   VERTEBRA: Straightening of the usual cervical lordosis. No acute compression fracture or posttraumatic subluxation. Osseous fusion of the C3-C4 lateral masses.    CANAL/FORAMINA: Multilevel spondylosis without high grade canal stenosis.    PARASPINAL: No prevertebral edema.      Impression    IMPRESSION:  HEAD CT:  1.  No acute intracranial process.    CERVICAL SPINE CT:  1.  No acute cervical spine fracture.   CT Cervical Spine w/o Contrast    Narrative    EXAM: CT HEAD W/O CONTRAST, CT CERVICAL SPINE W/O CONTRAST  LOCATION: [facility]  DATE: 11/11/2023    INDICATION: Fall, head trauma  COMPARISON: None.  TECHNIQUE:   1) Routine CT Head without IV contrast. Multiplanar reformats. Dose reduction  techniques were used.  2) Routine CT Cervical Spine without IV contrast. Multiplanar reformats. Dose reduction techniques were used.    FINDINGS:   HEAD CT:   INTRACRANIAL CONTENTS: No intracranial hemorrhage, extraaxial collection, or mass effect.  No CT evidence of acute infarct. Mild to moderate presumed chronic small vessel ischemic changes. Mild generalized volume loss. No hydrocephalus.     VISUALIZED ORBITS/SINUSES/MASTOIDS: No intraorbital abnormality. No significant paranasal sinus mucosal disease. No middle ear or mastoid effusion.    BONES/SOFT TISSUES: No acute abnormality.    CERVICAL SPINE CT:   VERTEBRA: Straightening of the usual cervical lordosis. No acute compression fracture or posttraumatic subluxation. Osseous fusion of the C3-C4 lateral masses.    CANAL/FORAMINA: Multilevel spondylosis without high grade canal stenosis.    PARASPINAL: No prevertebral edema.      Impression    IMPRESSION:  HEAD CT:  1.  No acute intracranial process.    CERVICAL SPINE CT:  1.  No acute cervical spine fracture.   XR Elbow Right 2 Views    Narrative    EXAM: XR ELBOW RIGHT 2 VIEWS  LOCATION: [facility]  DATE: 11/11/2023    INDICATION: fall, tenderness to the lateral elbow  COMPARISON: None.      Impression    IMPRESSION: Normal joint spaces and alignment. No fracture or joint effusion.   XR Surgery XIOMY L/T 5 Min Fluoro w Stills    Narrative    This exam was marked as non-reportable because it will not be read by a   radiologist or a [place] non-radiologist provider.         CT Head Perfusion w Contrast    Narrative    EXAM: CT HEAD PERFUSION W CONTRAST  LOCATION: [facility]  DATE: 11/12/2023    INDICATION: Decreased mental status. Decreased communication. Tier 2 code stroke  COMPARISON: None.  TECHNIQUE: Head and neck CT angiogram with IV contrast. Axial helical CT images of the head and neck vessels obtained during the arterial phase of intravenous contrast  administration. Axial 2D reconstructed images and multiplanar 3D MIP reconstructed   images of the head and neck vessels were performed by the technologist. Additional CT cerebral perfusion was performed utilizing a second contrast bolus. Perfusion data were post processed with generation of standard perfusion maps and estimation of   ischemic/infarcted volumes utilizing standard threshold values. Dose reduction techniques were used. All stenosis measurements made according to NASCET criteria unless otherwise specified.  CONTRAST: 50mL Isovue 370    FINDINGS:   HEAD CTA:  ANTERIOR CIRCULATION: No stenosis/occlusion, aneurysm, or high flow vascular malformation. Standard [place] anatomy.    POSTERIOR CIRCULATION: No stenosis/occlusion, aneurysm, or high flow vascular malformation. Balanced vertebral arteries supply a normal basilar artery.     DURAL VENOUS SINUSES: Expected enhancement of the major dural venous sinuses.    NECK CTA:  RIGHT CAROTID: Atherosclerotic plaque results in critical, 90% or greater, stenosis in the right ICA. No dissection.    LEFT CAROTID: Atherosclerotic plaque results in less than 50% stenosis in the left ICA. No dissection.    VERTEBRAL ARTERIES: No focal stenosis or dissection. Balanced vertebral arteries.    AORTIC ARCH: Classic aortic arch anatomy with no significant stenosis at the origin of the great vessels.    NONVASCULAR STRUCTURES: Unremarkable.    CT PERFUSION:  PERFUSION MAPS: Symmetrical cerebral perfusion. No focal deficits in cerebral blood flow or volume to suggest ischemia/oligemia.    RAPID ANALYSIS:  CBF<30%: 0  Tmax>6sec: 0  Mismatch volume: 0  Mismatch ratio: None      Impression    IMPRESSION:   HEAD CTA:   1.  Normal CTA [place].    NECK CTA:  1.  Chronic greater than 90% stenosis in the origin of the right ICA.  2.  Chronic less than 50% stenosis in the origin of the left ICA.  3.  Normal vertebral arteries.    CT PERFUSION:  1.  Normal cerebral  perfusion.    Head CT findings and head and neck CTA findings were discussed with Dr. Honeycutt at 9:16 AM hours on 11/12/2023.     CT Head w/o Contrast    Narrative    EXAM: CT HEAD W/O CONTRAST  LOCATION: [facility]  DATE: 11/12/2023    INDICATION: Altered mental status. Decreased communication. Tier 2 code stroke  COMPARISON: Head CT 11/11/2023  TECHNIQUE: Routine CT Head without IV contrast. Multiplanar reformats. Dose reduction techniques were used.    FINDINGS:  INTRACRANIAL CONTENTS: No intracranial hemorrhage, extraaxial collection, or mass effect.  No CT evidence of acute infarct. Moderate presumed chronic small vessel ischemic changes. Moderate generalized volume loss. No hydrocephalus.     VISUALIZED ORBITS/SINUSES/MASTOIDS: No intraorbital abnormality. No paranasal sinus mucosal disease. No middle ear or mastoid effusion.    BONES/SOFT TISSUES: No acute abnormality.      Impression    IMPRESSION:  1.  No CT evidence for acute intracranial process.  2.  Brain atrophy and presumed chronic microvascular ischemic changes as above.  3.  No interval change.   CTA Head Neck w Contrast    Narrative    EXAM: CTA HEAD NECK W CONTRAST  LOCATION: [facility]  DATE: 11/12/2023    INDICATION: Decreased mental status. Decreased communication. Tier 2 code stroke  COMPARISON: None.  TECHNIQUE: Head and neck CT angiogram with IV contrast. Axial helical CT images of the head and neck vessels obtained during the arterial phase of intravenous contrast administration. Axial 2D reconstructed images and multiplanar 3D MIP reconstructed   images of the head and neck vessels were performed by the technologist. Additional CT cerebral perfusion was performed utilizing a second contrast bolus. Perfusion data were post processed with generation of standard perfusion maps and estimation of   ischemic/infarcted volumes utilizing standard threshold values. Dose reduction techniques were used.  All stenosis measurements made according to NASCET criteria unless otherwise specified.  CONTRAST: 67mL Isovue 370    FINDINGS:   HEAD CTA:  ANTERIOR CIRCULATION: No stenosis/occlusion, aneurysm, or high flow vascular malformation. Standard [place] anatomy.    POSTERIOR CIRCULATION: No stenosis/occlusion, aneurysm, or high flow vascular malformation. Balanced vertebral arteries supply a normal basilar artery.     DURAL VENOUS SINUSES: Expected enhancement of the major dural venous sinuses.    NECK CTA:  RIGHT CAROTID: Atherosclerotic plaque results in critical, 90% or greater, stenosis in the right ICA. No dissection.    LEFT CAROTID: Atherosclerotic plaque results in less than 50% stenosis in the left ICA. No dissection.    VERTEBRAL ARTERIES: No focal stenosis or dissection. Balanced vertebral arteries.    AORTIC ARCH: Classic aortic arch anatomy with no significant stenosis at the origin of the great vessels.    NONVASCULAR STRUCTURES: Unremarkable.    CT PERFUSION:  PERFUSION MAPS: Symmetrical cerebral perfusion. No focal deficits in cerebral blood flow or volume to suggest ischemia/oligemia.    RAPID ANALYSIS:  CBF<30%: 0  Tmax>6sec: 0  Mismatch volume: 0  Mismatch ratio: None      Impression    IMPRESSION:   HEAD CTA:   1.  Normal CTA [place].    NECK CTA:  1.  Chronic greater than 90% stenosis in the origin of the right ICA.  2.  Chronic less than 50% stenosis in the origin of the left ICA.  3.  Normal vertebral arteries.    CT PERFUSION:  1.  Normal cerebral perfusion.    Head CT findings and head and neck CTA findings were discussed with Dr. Honeycutt at 9:16 AM hours on 11/12/2023.   MR Brain w/o & w Contrast    Narrative    MRI BRAIN WITHOUT AND WITH CONTRAST  11/13/2023 9:41 AM     HISTORY:  AMS, garbled speech, less responsive.    TECHNIQUE:  Multiplanar, multisequence MRI of the brain without and  with 8 mL Gadavist.     COMPARISON: Head CT 11/12/2023.     FINDINGS: There is no evidence  of acute infarct, hemorrhage, mass, or  herniation. Moderate diffuse parenchymal volume loss. Moderate patchy  deep and subcortical white matter T2 hyperintensities which are  nonspecific, but likely related to chronic microvascular ischemic  disease. Ventricular size within normal limits without hydrocephalus.     There is no abnormal intracranial enhancement.     The facial structures appear normal.       Impression    IMPRESSION:    1. No evidence of acute infarct, mass, hemorrhage, or herniation.  2. Moderate diffuse parenchymal volume loss and white matter changes  likely due to chronic microvascular ischemic disease.     JEANMARIE ADAMES MD         SYSTEM ID:  [id number]

## 2023-11-14 NOTE — PLAN OF CARE
Goal Outcome Evaluation:      Plan of Care Reviewed With: patient    Overall Patient Progress: improving     Pt disoriented to place, time and situation. Pt was restless this am. Prn Ativan given for pt to have MRI. Speech consulted d/t pt having right side drop, garbled speech and not alert for swallowing. Pt was placed on Level 6 soft, bite size with level 2 mildly thickened liquids today. Pt can take pills whole with apple sauce. Remote tele discontinued. Hypertensive BP prn Lobetalol given. Other VSS 2L NC. 94-96% Ra sats drop to 84-88%. LS clear. LS clear. Unable to do IS. Dressing small drainage noted. Pt does call out and try to get out of bed.  came to spend more time with her this evening. Prn Haldol given which helped some. Up with assist of 2 magy steady and KATHERINE but pt was not able to tolerate getting up today d/t lethargy and confusion. PT will assess again tomorrow. Plan is discharge to TCU or Memory care.

## 2023-11-14 NOTE — CONSULTS
Neurology Consult Note  [facility]       [2023]                                                                                       Admission Date: 11/10/2023  Hospital Day: 4      Patient: David Hogan      : 1945  MRN:  [id number]     CC:      Chief Complaint   Patient presents with    Fall       Consult Request:  Referring Provider:  Veronica Jo MD  Primary Care Provider:  Jessica Thompson MD        HPI:  David Hogan is a 78 year old yo female admitted for a fall and right intertrochanteric femur fracture. Patient has history of dementia for the last 6-7 years. She has h/o dream enactment behaviors for last 10-12 years. Her  notices that she becomes belligerent in the evening. She has h/o hallucinations. She has hypophonia and mumbles at times.Patient lives in a memory care facility for the last 2 years.          A complete review of symptoms was performed including vascular, infectious, cardiovascular, pulmonary, gastrointestinal, endocrinological, hematologic, dermatologic, musculoskeletal, and neurological. All were normal except as above.    PAST MEDICAL HISTORY:  ALLERGIES:   Allergies   Allergen Reactions    Amoxicillin     Hydrochlorothiazide W-Triamterene Other (See Comments)     Hyponatremia    Sertraline Other (See Comments)     Diarrhea     Tobacco:    History   Smoking Status    Former   Smokeless Tobacco    Never     Alcohol:  Social History    Substance and Sexual Activity      Alcohol use: Not Currently    MEDICATIONS:       CURRENTLY SCHEDULED MEDICATIONS    acetaminophen  975 mg Oral Q8H    carbidopa-levodopa  2 tablet Oral TID    DULoxetine  60 mg Oral Daily    enoxaparin ANTICOAGULANT  40 mg Subcutaneous Q24H    gabapentin  100 mg Oral TID    LORazepam  0.25 mg Oral At Bedtime    polyethylene glycol  17 g Oral Daily    predniSONE  25 mg Oral Once per day on     senna-docusate  1 tablet Oral BID     sodium chloride (PF)  3 mL Intracatheter Q8H    traZODone  50 mg Oral At Bedtime    Vitamin D3  50 mcg Oral Daily          HOME MEDICATIONS  Medications Prior to Admission   Medication Sig Dispense Refill Last Dose    alum & mag hydroxide-simethicone (MAALOX) 200-200-20 MG/5ML SUSP suspension Take 30 mLs by mouth daily as needed for indigestion       bisacodyl (DULCOLAX) 10 MG suppository Place 10 mg rectally daily as needed for constipation       carbidopa-levodopa (SINEMET)  MG tablet Take 2 tablets by mouth 3 times daily Times: 0730, 1130, 1630   11/9/2023 at pm    divalproex sodium delayed-release (DEPAKOTE) 125 MG DR tablet Take 125 mg by mouth 2 times daily Mood disorder   11/9/2023 at pm    DULoxetine (CYMBALTA) 60 MG capsule Take 60 mg by mouth daily   11/9/2023 at am    gabapentin (NEURONTIN) 100 MG capsule Take 100 mg by mouth 3 times daily 8am, 2pm, 8 pm   11/9/2023 at pm    LORazepam (ATIVAN) 0.5 MG tablet Take 0.5 mg by mouth at bedtime   11/9/2023    LORazepam (ATIVAN) 0.5 MG tablet Take 0.5 mg by mouth daily as needed for anxiety       menthol-zinc oxide (CALMOSEPTINE) 0.44-20.6 % OINT ointment Apply topically 3 times daily as needed for skin protection       mineral oil-hydrophilic petrolatum (AQUAPHOR) external ointment Apply topically 2 times daily To lower facial rash   11/9/2023 at pm    polyethylene glycol (MIRALAX) 17 g packet Take 17 g by mouth daily as needed for constipation 24 packet 0     pramox-pe-glycerin-petrolatum (PREPARATION H) 1-0.25-14.4-15 % CREA cream Place rectally 2 times daily as needed for hemorrhoids       predniSONE (DELTASONE) 5 MG tablet Take 25 mg by mouth twice a week on Saturday and Kenneth   11/10/2023 at am    senna-docusate (SENOKOT-S/PERICOLACE) 8.6-50 MG tablet Take 2 tablets by mouth 2 times daily as needed for constipation       traZODone (DESYREL) 50 MG tablet Take 50 mg by mouth at bedtime   11/9/2023 at     vitamin D3 (CHOLECALCIFEROL) 50 mcg (2000  "units) tablet Take 1 tablet by mouth daily   2023 at am    order for DME Equipment being ordered: Walker Wheels () and Walker ()  Treatment Diagnosis: Impaired functional mobility 1 each 0     [DISCONTINUED] acetaminophen (TYLENOL) 500 MG tablet Take 500 mg by mouth 3 times daily 730am, 2pm, 630pm   2023 at pm     MEDICAL HISTORY  Past Medical History:   Diagnosis Date    Hypertension      SURGICAL HISTORY  Past Surgical History:   Procedure Laterality Date    BACK SURGERY      2019    BIOPSY MUSCLE DIAGNOSTIC (LOCATION) Left 9/10/2020    Procedure: LEFT QUAD MUSCLE BIOPSY;  Surgeon: Yazan Chanel MD;  Location: [facility] OR    OPEN REDUCTION INTERNAL FIXATION RODDING INTRAMEDULLAR FEMUR FRACTURE TABLE Left 10/11/2020    Procedure: Open reduction internal fixation left intertrochanteric femur fracture with an intramedullary nail;  Surgeon: Huan Feliciano MD;  Location: [facility] OR     FAMILY HISTORY    History reviewed. No pertinent family history.  SOCIAL HISTORY  Social History     Socioeconomic History    Marital status:      Spouse name: None    Number of children: None    Years of education: None    Highest education level: None   Tobacco Use    Smoking status: Former    Smokeless tobacco: Never   Substance and Sexual Activity    Alcohol use: Not Currently    Drug use: Never                  Temp: 97.8  F (36.6  C)   Weight: 79 kg (174 lb 2.6 oz)    Temp src: Temporal         BP: (!) 141/70         Estimated body mass index is 29.9 kg/m  as calculated from the following:    Height as of 9/10/20: 1.626 m (5' 4\").    Weight as of this encounter: 79 kg (174 lb 2.6 oz).    Resp: 20   SpO2: 92 %   O2 Device: Oxymask     Blood Pressure:   BP Readings from Last 3 Encounters:   23 (!) 141/70   10/14/20 (!) 147/55   09/10/20 133/74     T24 : Temp (24hrs), Av.6  F (36.4  C), Min:96.8  F (36  C), Max:98.1  F (36.7  C)       GENERAL EXAMINATION:  HEENT: PERRLA, limited eye movements (could be " lack of cooperation)  Neck: Supple, No myofascial tender points.    NEUROLOGICAL EXAMINATION  Mental Status:  Patient is awake, alert, but disoriented to time and place.    Cranial Nerves: Pupils are equal and reactive to light. Extraocular movements are intact. There is no nystagmus in the horizontal or vertical planes. No facial weakness.      Motor: There is mild cogwheel rigidity bilaterally. Decreased rapid alternating movements in both hands. Deep tendon reflexes are 2+ and symmetric in his biceps, triceps, knees, and ankles. Plantars are flexor.  Patient had mild dyskinseias.        .  LABORATORY RESULTS      Recent Labs   Lab 11/13/23  0706 11/12/23  0722 11/11/23  0517   WBC 10.6 12.6* 11.0   HGB 8.8* 10.0* 11.7   HCT 27.9* 32.2* 37.0   MCV 92 92 91    321 367     Recent Labs   Lab 11/13/23  0706 11/12/23  0835 11/12/23  0722 11/11/23  1652 11/11/23  0517     --  133*  --  136   POTASSIUM 4.3  --  4.3  --  3.9   CHLORIDE 100  --  98  --  99   CO2 29  --  27  --  29   ANIONGAP 6*  --  8  --  8   GLC 86  86 103* 109*  109*  --  106*   BUN 21.3  --  20.6  --  17.3   CR 0.52  --  0.51 0.46* 0.55   GFRESTIMATED >90  --  >90 >90 >90   ANN-MARIE 8.7*  --  8.6*  --  8.9     Recent Labs   Lab 11/12/23  0945   COLOR Light Yellow   APPEARANCE Clear   URINEGLC Negative   URINEBILI Negative   URINEKETONE 20*   SG 1.010   UBLD Negative   URINEPH 6.5   PROTEIN Negative   NITRITE Negative   LEUKEST Negative   RBCU 0   WBCU <1       IMAGING RESULTS     Recent Results (from the past 24 hour(s))   MR Brain w/o & w Contrast    Narrative    MRI BRAIN WITHOUT AND WITH CONTRAST  11/13/2023 9:41 AM     HISTORY:  AMS, garbled speech, less responsive.    TECHNIQUE:  Multiplanar, multisequence MRI of the brain without and  with 8 mL Gadavist.     COMPARISON: Head CT 11/12/2023.     FINDINGS: There is no evidence of acute infarct, hemorrhage, mass, or  herniation. Moderate diffuse parenchymal volume loss. Moderate  patchy  deep and subcortical white matter T2 hyperintensities which are  nonspecific, but likely related to chronic microvascular ischemic  disease. Ventricular size within normal limits without hydrocephalus.     There is no abnormal intracranial enhancement.     The facial structures appear normal.       Impression    IMPRESSION:    1. No evidence of acute infarct, mass, hemorrhage, or herniation.  2. Moderate diffuse parenchymal volume loss and white matter changes  likely due to chronic microvascular ischemic disease.     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 Her cognitive changes started a few years before the onset of Parkinsonism. She would benefit from addition of Exelon and Namenda.           RECOMMENDATIONS   Exelon 1.5 mg BID and Namenda 5 mg BID.  Discussed progressive nature of her deficits with her .  Continue Gabapentin and cymbalta.  Reduce Sinemet 25/100 to 1 tablet 3 times a day to prevent dyskinesias and levodopa induced hallucinations.

## 2023-11-15 ENCOUNTER — APPOINTMENT (OUTPATIENT)
Dept: SPEECH THERAPY | Facility: CLINIC | Age: 78
DRG: 481 | End: 2023-11-15
Payer: MEDICARE

## 2023-11-15 LAB
ANION GAP SERPL CALCULATED.3IONS-SCNC: 9 MMOL/L (ref 7–15)
BUN SERPL-MCNC: 19.2 MG/DL (ref 8–23)
CALCIUM SERPL-MCNC: 9.1 MG/DL (ref 8.8–10.2)
CHLORIDE SERPL-SCNC: 97 MMOL/L (ref 98–107)
CREAT SERPL-MCNC: 0.44 MG/DL (ref 0.51–0.95)
DEPRECATED HCO3 PLAS-SCNC: 29 MMOL/L (ref 22–29)
EGFRCR SERPLBLD CKD-EPI 2021: >90 ML/MIN/1.73M2
ERYTHROCYTE [DISTWIDTH] IN BLOOD BY AUTOMATED COUNT: 14.4 % (ref 10–15)
GLUCOSE SERPL-MCNC: 115 MG/DL (ref 70–99)
HCT VFR BLD AUTO: 32.2 % (ref 35–47)
HGB BLD-MCNC: 10.2 G/DL (ref 11.7–15.7)
MCH RBC QN AUTO: 29.1 PG (ref 26.5–33)
MCHC RBC AUTO-ENTMCNC: 31.7 G/DL (ref 31.5–36.5)
MCV RBC AUTO: 92 FL (ref 78–100)
PLATELET # BLD AUTO: 405 10E3/UL (ref 150–450)
POTASSIUM SERPL-SCNC: 4.4 MMOL/L (ref 3.4–5.3)
RBC # BLD AUTO: 3.5 10E6/UL (ref 3.8–5.2)
SODIUM SERPL-SCNC: 135 MMOL/L (ref 135–145)
WBC # BLD AUTO: 9.9 10E3/UL (ref 4–11)

## 2023-11-15 PROCEDURE — 250N000013 HC RX MED GY IP 250 OP 250 PS 637

## 2023-11-15 PROCEDURE — 85041 AUTOMATED RBC COUNT: CPT | Performed by: HOSPITALIST

## 2023-11-15 PROCEDURE — 250N000013 HC RX MED GY IP 250 OP 250 PS 637: Performed by: PSYCHIATRY & NEUROLOGY

## 2023-11-15 PROCEDURE — 85027 COMPLETE CBC AUTOMATED: CPT | Performed by: HOSPITALIST

## 2023-11-15 PROCEDURE — 92526 ORAL FUNCTION THERAPY: CPT | Mod: GN

## 2023-11-15 PROCEDURE — 99233 SBSQ HOSP IP/OBS HIGH 50: CPT | Performed by: INTERNAL MEDICINE

## 2023-11-15 PROCEDURE — 250N000013 HC RX MED GY IP 250 OP 250 PS 637: Performed by: INTERNAL MEDICINE

## 2023-11-15 PROCEDURE — 80048 BASIC METABOLIC PNL TOTAL CA: CPT | Performed by: HOSPITALIST

## 2023-11-15 PROCEDURE — 120N000001 HC R&B MED SURG/OB

## 2023-11-15 PROCEDURE — 36415 COLL VENOUS BLD VENIPUNCTURE: CPT | Performed by: HOSPITALIST

## 2023-11-15 PROCEDURE — 250N000011 HC RX IP 250 OP 636: Mod: JZ

## 2023-11-15 RX ORDER — ACETAMINOPHEN 325 MG/1
650 TABLET ORAL EVERY 6 HOURS PRN
Status: DISCONTINUED | OUTPATIENT
Start: 2023-11-15 | End: 2023-11-16 | Stop reason: HOSPADM

## 2023-11-15 RX ADMIN — Medication 50 MCG: at 08:12

## 2023-11-15 RX ADMIN — MEMANTINE 5 MG: 5 TABLET ORAL at 20:04

## 2023-11-15 RX ADMIN — CARBIDOPA AND LEVODOPA 1 TABLET: 25; 100 TABLET ORAL at 12:37

## 2023-11-15 RX ADMIN — SENNOSIDES AND DOCUSATE SODIUM 1 TABLET: 8.6; 5 TABLET ORAL at 08:12

## 2023-11-15 RX ADMIN — GABAPENTIN 100 MG: 100 CAPSULE ORAL at 14:06

## 2023-11-15 RX ADMIN — ACETAMINOPHEN 650 MG: 325 TABLET, FILM COATED ORAL at 14:06

## 2023-11-15 RX ADMIN — CARBIDOPA AND LEVODOPA 1 TABLET: 25; 100 TABLET ORAL at 08:12

## 2023-11-15 RX ADMIN — DULOXETINE HYDROCHLORIDE 60 MG: 60 CAPSULE, DELAYED RELEASE ORAL at 08:12

## 2023-11-15 RX ADMIN — RIVASTIGMINE TARTRATE 1.5 MG: 1.5 CAPSULE ORAL at 08:11

## 2023-11-15 RX ADMIN — CARBIDOPA AND LEVODOPA 1 TABLET: 25; 100 TABLET ORAL at 17:24

## 2023-11-15 RX ADMIN — MEMANTINE 5 MG: 5 TABLET ORAL at 08:12

## 2023-11-15 RX ADMIN — ENOXAPARIN SODIUM 40 MG: 40 INJECTION SUBCUTANEOUS at 10:46

## 2023-11-15 RX ADMIN — GABAPENTIN 100 MG: 100 CAPSULE ORAL at 20:03

## 2023-11-15 RX ADMIN — GABAPENTIN 100 MG: 100 CAPSULE ORAL at 08:12

## 2023-11-15 RX ADMIN — TRAZODONE HYDROCHLORIDE 50 MG: 50 TABLET ORAL at 21:38

## 2023-11-15 RX ADMIN — RIVASTIGMINE TARTRATE 1.5 MG: 1.5 CAPSULE ORAL at 20:04

## 2023-11-15 RX ADMIN — LORAZEPAM 0.25 MG: 0.5 TABLET ORAL at 21:38

## 2023-11-15 ASSESSMENT — ACTIVITIES OF DAILY LIVING (ADL)
ADLS_ACUITY_SCORE: 52
ADLS_ACUITY_SCORE: 52
ADLS_ACUITY_SCORE: 55
ADLS_ACUITY_SCORE: 55
ADLS_ACUITY_SCORE: 52
ADLS_ACUITY_SCORE: 51
ADLS_ACUITY_SCORE: 55
ADLS_ACUITY_SCORE: 52

## 2023-11-15 NOTE — PROGRESS NOTES
Orthopedic Surgery  David Hogan  11/15/2023     Admit Date:  11/10/2023    POD: 4 Days Post-Op   Procedure(s):  Surgical treatment of right intertrochanteric femur fracture with cephalomedullary device     Patient resting in bed.     Alert again today.  Able to answer questions.     Temp:  [98.2  F (36.8  C)-98.4  F (36.9  C)] 98.2  F (36.8  C)  Pulse:  [] 91  Resp:  [16-20] 16  BP: (136-191)/(53-88) 136/53  SpO2:  [92 %-96 %] 96 %    Dementia at baseline.   Dressings are clean, dry and intact.   Moderate right thigh swelling.   Bilateral calves are soft, non-tender.  Right lower extremity is NVI.  Sensation intact bilateral lower extremities  Patient able to resist dorsi and plantar flexion bilaterally  +Dp pulse    Labs:  Recent Labs   Lab Test 11/15/23  0655 11/14/23  0659 11/13/23  0706   WBC 9.9 10.6 10.6   HGB 10.2* 9.0* 8.8*    312 304     Recent Labs   Lab Test 11/11/23  0517 10/10/20  1407   INR 0.92 0.95       PLAN:   Continue lovenox in house, ASA 81mg bid at discharge for DVT prophylaxis.     Mobilize with PT/OT    WBAT right LE with walker.     Continue current pain regiment.   Dressings: Keep intact.  Change if >60% saturated or peeling off.    Follow-up: 2 weeks post-op with Dr Kemp team    DISPO:    Anticipate d/c to home (memory care) when medically cleared and progressing in PT.  Ortho stable.     Marie Horner PA-C

## 2023-11-15 NOTE — PLAN OF CARE
Goal Outcome Evaluation:      Plan of Care Reviewed With: patient    Overall Patient Progress: no changeOverall Patient Progress: no change     Patient vital signs are at baseline: No,  Reason: on 1L O2  Patient able to ambulate as they were prior to admission or with assist devices provided by therapies during their stay:  No,  Reason:  Assist x2 with magy steady and gait belt.   Patient MUST void prior to discharge:  Yes  Patient able to tolerate oral intake:  Yes  Pain has adequate pain control using Oral analgesics:  Yes  Does patient have an identified :  Yes  Has goal D/C date and time been discussed with patient:  Yes    Pt alert to self only. VSS; 1L O2 via NC. Pain managed with PRN oxycodone. Assist x2 with magy steady and gait belt. Voiding, incontinent. Tolerating a soft/bite size diet with thin liquids well. ROBINSON CMS due to confusion. Dressing with small dried drainage. Plan is TCU at discharge.

## 2023-11-15 NOTE — PLAN OF CARE
Goal Outcome Evaluation:   Oriented to self only.   VSS. Room air. Had a soft BM, voided. Voided 100cc and also into the toilet this AM- bladder scan 35cc.  Up in chair with the ceiling lift 2 assist. We used the magy steady this AM to the toilet but required a lot of assistance and lifting with 3 staff.   Able to lift all extremities off bed.    at the bedside.   Aquacel dressing clean and dry to right hip.   Ate fair, helped with meals. Encouraged fluids all day.   Assisted/dependent with all cares.   Leaned to the left all day so pillows assisted with positioning.   Isoflex air canister hooked up to the mattress.   Tylenol for comfort.       Plan of Care Reviewed With: patient, spouse    Overall Patient Progress: improvingOverall Patient Progress: improving     Patient vital signs are at baseline: Yes  Patient able to ambulate as they were prior to admission or with assist devices provided by therapies during their stay:  No,  Reason:  lift  Patient MUST void prior to discharge:  Yes. Encouraged fluids.   Patient able to tolerate oral intake:  Yes  Pain has adequate pain control using Oral analgesics:  Yes  Does patient have an identified :  Yes. Dimitry at bedside.   Has goal D/C date and time been discussed with patient:  Yes . CC following.

## 2023-11-15 NOTE — PROGRESS NOTES
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     She underwent ORIF on 11/11    Brief hospitalization notable for acute neurologic changes resulting in RRT->Code stroke due to new right facial droop/garbled speech, of note had recently received opioids for pain.  Stroke eval negative, symptoms have improved      Problem List:   Right intertrochanteric hip fx  S/p ORIF on 11/11-appreciate ortho assistance   Has apap and low dose narcotics available for pain control     Acute neurological changes with facial droop/garbled speech   Stroke w/up is negative.  Sx persist.  D/w  who states this has been going on for the past 3-4 weeks.  Interestingly she was started on lorazepam 0.5mg at bedtime scheduled with an additional dose every day prn.  Then she was having issues with behavior so started on depakote about 10 days ago.  In any case her symptoms are much worse.  When I see her speech is very garbled at times I can't even understand what she is saying.  RN is concerned about her ability to swallow as well  ? Med effect, ? 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  Parkinson's dz  Cont pta carbi/levodopa  2 tabs tid-> 1 tab tid due to concern for akasthesia   Cont with pta prednisone 25 mg twice weekly (M-Th)    Htn/orthostatic hypotension   She's on no vasoactive medications    Dementia with anxiety   Cont pta lorazepam at bedtime and daily prn as well as trazodone at night time    hyponatremia  Mild intermittent and chronic  Appears to be at baseline       Covid   S/p 3 shot moderna series 12/2021    DVT Prophylaxis: Enoxaparin (Lovenox) SQ  Code Status: DNR / DNI  Functional Status: forgot to ask family  Diet: reg texture but needs help eating  Riddle: not needed  Access PIV    Disposition Plan   Expected discharge tomorrow to  suspect will need TCU as not clear that [facility] will be able to meet her needs  once PT eval complete and appropriate bed found if unable to return to [facility] .     Entered: Hina Dean MD 11/15/2023, 4:59 PM       Securely message with [employer] (more info)  Text page via [employer]     Discussed with  and other family members at the bedside    I spent 50 minutes reviewing epic including prior labs/imaging/medical history and notes related to this encounter  In addition time was spent in interveiwing the patient, communicating with contacts, and medical decision making      Interval History (Subjective):      She has been much more pleasant with me.  Today she was pretty kattywampus in bed and requested my help.  She stated she had a little pain but denied any breathing difficulties.  Denies n/v and apparently appetite is fair                   Physical Exam:      Last Vital Signs:  BP (!) 153/69 (BP Location: Left arm)   Pulse 91   Temp 97.4  F (36.3  C) (Temporal)   Resp 16   Ht 1.651 m (5' 5\")   Wt 78.7 kg (173 lb 8 oz)   SpO2 94%   Breastfeeding No   BMI 28.87 kg/m      I/O:  Speech is clear today, facial droop appears resolved as well  Lungs ctab nl effort rrr no mrg no " le edema skin warm and dry no cyanosis or clubbing alert, oriented to self, cooperative with cares            Medications:      All current medications were reviewed with changes reflected in problem list.         Data:      All new lab and imaging data was reviewed.   Labs:  Recent Labs   Lab 11/15/23  0655      POTASSIUM 4.4   CHLORIDE 97*   CO2 29   ANIONGAP 9   *   BUN 19.2   CR 0.44*   GFRESTIMATED >90   ANN-MARIE 9.1     Recent Labs   Lab 11/15/23  0655   WBC 9.9   HGB 10.2*   HCT 32.2*   MCV 92        Recent Labs   Lab 11/15/23  0655 11/14/23  0659 11/13/23  0706 11/12/23  0835 11/12/23  0722   * 115* 86  86 103* 109*  109*     Recent Labs   Lab 11/14/23  0659   AST 30   ALT 8   ALKPHOS 80   BILITOTAL 0.3      Imaging:   Results for orders placed or performed during the hospital encounter of 11/10/23   CT Chest/Abdomen/Pelvis w Contrast    Narrative    EXAM: CT CHEST/ABDOMEN/PELVIS W CONTRAST  LOCATION: [facility]  DATE: 11/11/2023    INDICATION: fall, diffuse chest tenderness. right sided abdominal pain and hip pain  COMPARISON: None.  TECHNIQUE: CT scan of the chest, abdomen, and pelvis was performed following injection of IV contrast. Multiplanar reformats were obtained. Dose reduction techniques were used.   CONTRAST: 60mL Isovue 370    FINDINGS:   LUNGS AND PLEURA: Negative, no pleural effusion or pneumothorax.    MEDIASTINUM/AXILLAE: Normal.    CORONARY ARTERY CALCIFICATION: Mild.    HEPATOBILIARY: Tiny hepatic cysts. No traumatic lesion evident.    PANCREAS: Normal.    SPLEEN: Normal.    ADRENAL GLANDS: Fullness of both adrenals suggesting small bilateral benign adenomas.    KIDNEYS/BLADDER: Normal.    BOWEL: No obstruction or inflammatory change. No ascites.    LYMPH NODES: Normal.    VASCULATURE: Moderate atherosclerotic plaque.    PELVIC ORGANS: Normal.    MUSCULOSKELETAL: Prior left hip ORIF. Spinal fusion.  Acute intertrochanteric fracture right hip.       Impression    IMPRESSION:  1.  Acute appearing right intertrochanteric hip fracture.  2.   No other acute/traumatic abnormality identified.   XR Femur Right 2 Views    Narrative    EXAM: XR FEMUR RIGHT 2 VIEWS  LOCATION: [facility]  DATE: 11/11/2023    INDICATION: Right lower extremity pain.  COMPARISON: X-ray pelvis single view 10/10/2020 at 1206 hours.      Impression    IMPRESSION: Interval development of acute comminuted proximal right femoral fracture, with varus deformity. Right femoral head is located in the acetabulum. Degenerative changes right hip and knee joint.   CT Head w/o Contrast    Narrative    EXAM: CT HEAD W/O CONTRAST, CT CERVICAL SPINE W/O CONTRAST  LOCATION: [facility]  DATE: 11/11/2023    INDICATION: Fall, head trauma  COMPARISON: None.  TECHNIQUE:   1) Routine CT Head without IV contrast. Multiplanar reformats. Dose reduction techniques were used.  2) Routine CT Cervical Spine without IV contrast. Multiplanar reformats. Dose reduction techniques were used.    FINDINGS:   HEAD CT:   INTRACRANIAL CONTENTS: No intracranial hemorrhage, extraaxial collection, or mass effect.  No CT evidence of acute infarct. Mild to moderate presumed chronic small vessel ischemic changes. Mild generalized volume loss. No hydrocephalus.     VISUALIZED ORBITS/SINUSES/MASTOIDS: No intraorbital abnormality. No significant paranasal sinus mucosal disease. No middle ear or mastoid effusion.    BONES/SOFT TISSUES: No acute abnormality.    CERVICAL SPINE CT:   VERTEBRA: Straightening of the usual cervical lordosis. No acute compression fracture or posttraumatic subluxation. Osseous fusion of the C3-C4 lateral masses.    CANAL/FORAMINA: Multilevel spondylosis without high grade canal stenosis.    PARASPINAL: No prevertebral edema.      Impression    IMPRESSION:  HEAD CT:  1.  No acute intracranial process.    CERVICAL SPINE CT:  1.  No acute cervical spine fracture.   CT  Cervical Spine w/o Contrast    Narrative    EXAM: CT HEAD W/O CONTRAST, CT CERVICAL SPINE W/O CONTRAST  LOCATION: [facility]  DATE: 11/11/2023    INDICATION: Fall, head trauma  COMPARISON: None.  TECHNIQUE:   1) Routine CT Head without IV contrast. Multiplanar reformats. Dose reduction techniques were used.  2) Routine CT Cervical Spine without IV contrast. Multiplanar reformats. Dose reduction techniques were used.    FINDINGS:   HEAD CT:   INTRACRANIAL CONTENTS: No intracranial hemorrhage, extraaxial collection, or mass effect.  No CT evidence of acute infarct. Mild to moderate presumed chronic small vessel ischemic changes. Mild generalized volume loss. No hydrocephalus.     VISUALIZED ORBITS/SINUSES/MASTOIDS: No intraorbital abnormality. No significant paranasal sinus mucosal disease. No middle ear or mastoid effusion.    BONES/SOFT TISSUES: No acute abnormality.    CERVICAL SPINE CT:   VERTEBRA: Straightening of the usual cervical lordosis. No acute compression fracture or posttraumatic subluxation. Osseous fusion of the C3-C4 lateral masses.    CANAL/FORAMINA: Multilevel spondylosis without high grade canal stenosis.    PARASPINAL: No prevertebral edema.      Impression    IMPRESSION:  HEAD CT:  1.  No acute intracranial process.    CERVICAL SPINE CT:  1.  No acute cervical spine fracture.   XR Elbow Right 2 Views    Narrative    EXAM: XR ELBOW RIGHT 2 VIEWS  LOCATION: [facility]  DATE: 11/11/2023    INDICATION: fall, tenderness to the lateral elbow  COMPARISON: None.      Impression    IMPRESSION: Normal joint spaces and alignment. No fracture or joint effusion.   XR Surgery XIOMY L/T 5 Min Fluoro w Stills    Narrative    This exam was marked as non-reportable because it will not be read by a   radiologist or a [place] non-radiologist provider.         CT Head Perfusion w Contrast    Narrative    EXAM: CT HEAD PERFUSION W CONTRAST  LOCATION: [facility]  DATE: 11/12/2023    INDICATION: Decreased mental status. Decreased communication. Tier 2 code stroke  COMPARISON: None.  TECHNIQUE: Head and neck CT angiogram with IV contrast. Axial helical CT images of the head and neck vessels obtained during the arterial phase of intravenous contrast administration. Axial 2D reconstructed images and multiplanar 3D MIP reconstructed   images of the head and neck vessels were performed by the technologist. Additional CT cerebral perfusion was performed utilizing a second contrast bolus. Perfusion data were post processed with generation of standard perfusion maps and estimation of   ischemic/infarcted volumes utilizing standard threshold values. Dose reduction techniques were used. All stenosis measurements made according to NASCET criteria unless otherwise specified.  CONTRAST: 50mL Isovue 370    FINDINGS:   HEAD CTA:  ANTERIOR CIRCULATION: No stenosis/occlusion, aneurysm, or high flow vascular malformation. Standard [place] anatomy.    POSTERIOR CIRCULATION: No stenosis/occlusion, aneurysm, or high flow vascular malformation. Balanced vertebral arteries supply a normal basilar artery.     DURAL VENOUS SINUSES: Expected enhancement of the major dural venous sinuses.    NECK CTA:  RIGHT CAROTID: Atherosclerotic plaque results in critical, 90% or greater, stenosis in the right ICA. No dissection.    LEFT CAROTID: Atherosclerotic plaque results in less than 50% stenosis in the left ICA. No dissection.    VERTEBRAL ARTERIES: No focal stenosis or dissection. Balanced vertebral arteries.    AORTIC ARCH: Classic aortic arch anatomy with no significant stenosis at the origin of the great vessels.    NONVASCULAR STRUCTURES: Unremarkable.    CT PERFUSION:  PERFUSION MAPS: Symmetrical cerebral perfusion. No focal deficits in cerebral blood flow or volume to suggest ischemia/oligemia.    RAPID ANALYSIS:  CBF<30%: 0  Tmax>6sec: 0  Mismatch volume: 0  Mismatch ratio:  None      Impression    IMPRESSION:   HEAD CTA:   1.  Normal CTA [place].    NECK CTA:  1.  Chronic greater than 90% stenosis in the origin of the right ICA.  2.  Chronic less than 50% stenosis in the origin of the left ICA.  3.  Normal vertebral arteries.    CT PERFUSION:  1.  Normal cerebral perfusion.    Head CT findings and head and neck CTA findings were discussed with Dr. Honeycutt at 9:16 AM hours on 11/12/2023.     CT Head w/o Contrast    Narrative    EXAM: CT HEAD W/O CONTRAST  LOCATION: [facility]  DATE: 11/12/2023    INDICATION: Altered mental status. Decreased communication. Tier 2 code stroke  COMPARISON: Head CT 11/11/2023  TECHNIQUE: Routine CT Head without IV contrast. Multiplanar reformats. Dose reduction techniques were used.    FINDINGS:  INTRACRANIAL CONTENTS: No intracranial hemorrhage, extraaxial collection, or mass effect.  No CT evidence of acute infarct. Moderate presumed chronic small vessel ischemic changes. Moderate generalized volume loss. No hydrocephalus.     VISUALIZED ORBITS/SINUSES/MASTOIDS: No intraorbital abnormality. No paranasal sinus mucosal disease. No middle ear or mastoid effusion.    BONES/SOFT TISSUES: No acute abnormality.      Impression    IMPRESSION:  1.  No CT evidence for acute intracranial process.  2.  Brain atrophy and presumed chronic microvascular ischemic changes as above.  3.  No interval change.   CTA Head Neck w Contrast    Narrative    EXAM: CTA HEAD NECK W CONTRAST  LOCATION: [facility]  DATE: 11/12/2023    INDICATION: Decreased mental status. Decreased communication. Tier 2 code stroke  COMPARISON: None.  TECHNIQUE: Head and neck CT angiogram with IV contrast. Axial helical CT images of the head and neck vessels obtained during the arterial phase of intravenous contrast administration. Axial 2D reconstructed images and multiplanar 3D MIP reconstructed   images of the head and neck vessels were performed  by the technologist. Additional CT cerebral perfusion was performed utilizing a second contrast bolus. Perfusion data were post processed with generation of standard perfusion maps and estimation of   ischemic/infarcted volumes utilizing standard threshold values. Dose reduction techniques were used. All stenosis measurements made according to NASCET criteria unless otherwise specified.  CONTRAST: 67mL Isovue 370    FINDINGS:   HEAD CTA:  ANTERIOR CIRCULATION: No stenosis/occlusion, aneurysm, or high flow vascular malformation. Standard [place] anatomy.    POSTERIOR CIRCULATION: No stenosis/occlusion, aneurysm, or high flow vascular malformation. Balanced vertebral arteries supply a normal basilar artery.     DURAL VENOUS SINUSES: Expected enhancement of the major dural venous sinuses.    NECK CTA:  RIGHT CAROTID: Atherosclerotic plaque results in critical, 90% or greater, stenosis in the right ICA. No dissection.    LEFT CAROTID: Atherosclerotic plaque results in less than 50% stenosis in the left ICA. No dissection.    VERTEBRAL ARTERIES: No focal stenosis or dissection. Balanced vertebral arteries.    AORTIC ARCH: Classic aortic arch anatomy with no significant stenosis at the origin of the great vessels.    NONVASCULAR STRUCTURES: Unremarkable.    CT PERFUSION:  PERFUSION MAPS: Symmetrical cerebral perfusion. No focal deficits in cerebral blood flow or volume to suggest ischemia/oligemia.    RAPID ANALYSIS:  CBF<30%: 0  Tmax>6sec: 0  Mismatch volume: 0  Mismatch ratio: None      Impression    IMPRESSION:   HEAD CTA:   1.  Normal CTA [place].    NECK CTA:  1.  Chronic greater than 90% stenosis in the origin of the right ICA.  2.  Chronic less than 50% stenosis in the origin of the left ICA.  3.  Normal vertebral arteries.    CT PERFUSION:  1.  Normal cerebral perfusion.    Head CT findings and head and neck CTA findings were discussed with Dr. Honeycutt at 9:16 AM hours on 11/12/2023.   MR Brain  w/o & w Contrast    Narrative    MRI BRAIN WITHOUT AND WITH CONTRAST  11/13/2023 9:41 AM     HISTORY:  AMS, garbled speech, less responsive.    TECHNIQUE:  Multiplanar, multisequence MRI of the brain without and  with 8 mL Gadavist.     COMPARISON: Head CT 11/12/2023.     FINDINGS: There is no evidence of acute infarct, hemorrhage, mass, or  herniation. Moderate diffuse parenchymal volume loss. Moderate patchy  deep and subcortical white matter T2 hyperintensities which are  nonspecific, but likely related to chronic microvascular ischemic  disease. Ventricular size within normal limits without hydrocephalus.     There is no abnormal intracranial enhancement.     The facial structures appear normal.       Impression    IMPRESSION:    1. No evidence of acute infarct, mass, hemorrhage, or herniation.  2. Moderate diffuse parenchymal volume loss and white matter changes  likely due to chronic microvascular ischemic disease.     JEANMARIE ADAMES MD         SYSTEM ID:  [id number]

## 2023-11-15 NOTE — PLAN OF CARE
Patient vital signs are at baseline: No,  Reason:  on 1L of O2  Patient able to ambulate as they were prior to admission or with assist devices provided by therapies during their stay:  No,  Reason:  assist of 2 with magy walker  Patient MUST void prior to discharge:  Yes  Patient able to tolerate oral intake:  Yes  Pain has adequate pain control using Oral analgesics:  Yes  Does patient have an identified :  Yes  Has goal D/C date and time been discussed with patient:  No,  Reason:  pending TCU placement.    Pt is alert to self, pain controlled with prn Oxycodone 5 mg, Pt had 2 large stools after MOM, upper dressing with dry small amt of drainage.  Lower dressing cdi. SL. Took meds with apple sauce. Pt is more calmed today, transferred to bathroom x1 with magy walker.

## 2023-11-16 ENCOUNTER — LAB REQUISITION (OUTPATIENT)
Dept: LAB | Facility: CLINIC | Age: 78
End: 2023-11-16
Payer: MEDICARE

## 2023-11-16 VITALS
SYSTOLIC BLOOD PRESSURE: 167 MMHG | OXYGEN SATURATION: 95 % | TEMPERATURE: 97.4 F | RESPIRATION RATE: 16 BRPM | HEART RATE: 100 BPM | DIASTOLIC BLOOD PRESSURE: 57 MMHG | BODY MASS INDEX: 28.91 KG/M2 | HEIGHT: 65 IN | WEIGHT: 173.5 LBS

## 2023-11-16 DIAGNOSIS — Z11.1 ENCOUNTER FOR SCREENING FOR RESPIRATORY TUBERCULOSIS: ICD-10-CM

## 2023-11-16 LAB
ANION GAP SERPL CALCULATED.3IONS-SCNC: 8 MMOL/L (ref 7–15)
BUN SERPL-MCNC: 20.9 MG/DL (ref 8–23)
CALCIUM SERPL-MCNC: 8.6 MG/DL (ref 8.8–10.2)
CHLORIDE SERPL-SCNC: 98 MMOL/L (ref 98–107)
CREAT SERPL-MCNC: 0.46 MG/DL (ref 0.51–0.95)
DEPRECATED HCO3 PLAS-SCNC: 28 MMOL/L (ref 22–29)
EGFRCR SERPLBLD CKD-EPI 2021: >90 ML/MIN/1.73M2
ERYTHROCYTE [DISTWIDTH] IN BLOOD BY AUTOMATED COUNT: 14.3 % (ref 10–15)
GLUCOSE SERPL-MCNC: 112 MG/DL (ref 70–99)
HCT VFR BLD AUTO: 29.3 % (ref 35–47)
HGB BLD-MCNC: 9.2 G/DL (ref 11.7–15.7)
MCH RBC QN AUTO: 28.3 PG (ref 26.5–33)
MCHC RBC AUTO-ENTMCNC: 31.4 G/DL (ref 31.5–36.5)
MCV RBC AUTO: 90 FL (ref 78–100)
PLATELET # BLD AUTO: 396 10E3/UL (ref 150–450)
POTASSIUM SERPL-SCNC: 4.2 MMOL/L (ref 3.4–5.3)
RBC # BLD AUTO: 3.25 10E6/UL (ref 3.8–5.2)
SODIUM SERPL-SCNC: 134 MMOL/L (ref 135–145)
WBC # BLD AUTO: 10.3 10E3/UL (ref 4–11)

## 2023-11-16 PROCEDURE — 250N000011 HC RX IP 250 OP 636: Performed by: INTERNAL MEDICINE

## 2023-11-16 PROCEDURE — 250N000013 HC RX MED GY IP 250 OP 250 PS 637: Performed by: PSYCHIATRY & NEUROLOGY

## 2023-11-16 PROCEDURE — 36415 COLL VENOUS BLD VENIPUNCTURE: CPT | Performed by: HOSPITALIST

## 2023-11-16 PROCEDURE — 250N000013 HC RX MED GY IP 250 OP 250 PS 637

## 2023-11-16 PROCEDURE — 250N000013 HC RX MED GY IP 250 OP 250 PS 637: Performed by: INTERNAL MEDICINE

## 2023-11-16 PROCEDURE — 85027 COMPLETE CBC AUTOMATED: CPT | Performed by: HOSPITALIST

## 2023-11-16 PROCEDURE — 99239 HOSP IP/OBS DSCHRG MGMT >30: CPT | Performed by: INTERNAL MEDICINE

## 2023-11-16 PROCEDURE — 90662 IIV NO PRSV INCREASED AG IM: CPT | Performed by: INTERNAL MEDICINE

## 2023-11-16 PROCEDURE — 250N000011 HC RX IP 250 OP 636: Mod: JZ

## 2023-11-16 PROCEDURE — 250N000012 HC RX MED GY IP 250 OP 636 PS 637: Performed by: HOSPITALIST

## 2023-11-16 PROCEDURE — G0008 ADMIN INFLUENZA VIRUS VAC: HCPCS | Performed by: INTERNAL MEDICINE

## 2023-11-16 PROCEDURE — 82310 ASSAY OF CALCIUM: CPT | Performed by: HOSPITALIST

## 2023-11-16 RX ORDER — ACETAMINOPHEN 325 MG/1
975 TABLET ORAL EVERY 8 HOURS
DISCHARGE
Start: 2023-11-16 | End: 2023-11-21

## 2023-11-16 RX ORDER — LORAZEPAM 0.5 MG/1
TABLET ORAL
Qty: 7 TABLET | Refills: 0 | Status: SHIPPED | OUTPATIENT
Start: 2023-11-16

## 2023-11-16 RX ORDER — RIVASTIGMINE TARTRATE 1.5 MG/1
1.5 CAPSULE ORAL 2 TIMES DAILY
DISCHARGE
Start: 2023-11-16

## 2023-11-16 RX ORDER — CARBIDOPA AND LEVODOPA 25; 100 MG/1; MG/1
1 TABLET ORAL 3 TIMES DAILY
DISCHARGE
Start: 2023-11-16

## 2023-11-16 RX ORDER — TRAZODONE HYDROCHLORIDE 50 MG/1
50 TABLET, FILM COATED ORAL AT BEDTIME
Qty: 30 TABLET | Refills: 0 | Status: SHIPPED | OUTPATIENT
Start: 2023-11-16

## 2023-11-16 RX ORDER — OXYCODONE HYDROCHLORIDE 5 MG/1
2.5 TABLET ORAL EVERY 4 HOURS PRN
Qty: 15 TABLET | Refills: 0 | Status: SHIPPED | OUTPATIENT
Start: 2023-11-16

## 2023-11-16 RX ADMIN — CARBIDOPA AND LEVODOPA 1 TABLET: 25; 100 TABLET ORAL at 16:50

## 2023-11-16 RX ADMIN — RIVASTIGMINE TARTRATE 1.5 MG: 1.5 CAPSULE ORAL at 08:45

## 2023-11-16 RX ADMIN — DULOXETINE HYDROCHLORIDE 60 MG: 60 CAPSULE, DELAYED RELEASE ORAL at 08:45

## 2023-11-16 RX ADMIN — CARBIDOPA AND LEVODOPA 1 TABLET: 25; 100 TABLET ORAL at 11:23

## 2023-11-16 RX ADMIN — MEMANTINE 5 MG: 5 TABLET ORAL at 08:46

## 2023-11-16 RX ADMIN — CARBIDOPA AND LEVODOPA 1 TABLET: 25; 100 TABLET ORAL at 08:45

## 2023-11-16 RX ADMIN — GABAPENTIN 100 MG: 100 CAPSULE ORAL at 08:46

## 2023-11-16 RX ADMIN — POLYETHYLENE GLYCOL 3350 17 G: 17 POWDER, FOR SOLUTION ORAL at 08:45

## 2023-11-16 RX ADMIN — Medication 50 MCG: at 08:46

## 2023-11-16 RX ADMIN — GABAPENTIN 100 MG: 100 CAPSULE ORAL at 14:23

## 2023-11-16 RX ADMIN — INFLUENZA A VIRUS A/VICTORIA/4897/2022 IVR-238 (H1N1) ANTIGEN (FORMALDEHYDE INACTIVATED), INFLUENZA A VIRUS A/DARWIN/9/2021 SAN-010 (H3N2) ANTIGEN (FORMALDEHYDE INACTIVATED), INFLUENZA B VIRUS B/PHUKET/3073/2013 ANTIGEN (FORMALDEHYDE INACTIVATED), AND INFLUENZA B VIRUS B/MICHIGAN/01/2021 ANTIGEN (FORMALDEHYDE INACTIVATED) 0.7 ML: 60; 60; 60; 60 INJECTION, SUSPENSION INTRAMUSCULAR at 14:43

## 2023-11-16 RX ADMIN — PREDNISONE 25 MG: 5 TABLET ORAL at 10:35

## 2023-11-16 RX ADMIN — ACETAMINOPHEN 650 MG: 325 TABLET, FILM COATED ORAL at 16:50

## 2023-11-16 RX ADMIN — ACETAMINOPHEN 650 MG: 325 TABLET, FILM COATED ORAL at 08:46

## 2023-11-16 RX ADMIN — ENOXAPARIN SODIUM 40 MG: 40 INJECTION SUBCUTANEOUS at 10:35

## 2023-11-16 ASSESSMENT — ACTIVITIES OF DAILY LIVING (ADL)
ADLS_ACUITY_SCORE: 55
ADLS_ACUITY_SCORE: 55
ADLS_ACUITY_SCORE: 51
ADLS_ACUITY_SCORE: 55
ADLS_ACUITY_SCORE: 55
ADLS_ACUITY_SCORE: 51
ADLS_ACUITY_SCORE: 55
ADLS_ACUITY_SCORE: 55
ADLS_ACUITY_SCORE: 51

## 2023-11-16 NOTE — PLAN OF CARE
Goal Outcome Evaluation:      Plan of Care Reviewed With: patient, spouse    Overall Patient Progress: improvingOverall Patient Progress: improving     No c/o pain. Up to chair & BSC with lift. Gavin bite size diet. Urine output dk vernon, smaller amts- fluids encouraged. Cms intact, drsg CD&I. Pt resting comfortable in bed. Forgetful. Disoriented to place time & situation

## 2023-11-16 NOTE — DISCHARGE SUMMARY
Discharge Summary  Hospitalist Service    David Hogan MRN# [id number]   YOB: 1945 Age: 78 year old     Date of Admission:  11/10/2023  Date of Discharge:  11/16/2023  Admitting Physician:  Veronica Jo MD  Discharge Physician: Hina Dean MD  Discharging Service: Hospitalist Service     Primary Provider: Jessica Thompson  Primary Care Physician Phone Number: [phone]         Discharge Diagnoses/Problem Oriented Hospital Course (Providers):      David Hogan was admitted on 11/10/2023 by Veronica Jo MD and I would refer you to their history and physical.  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      She underwent ORIF on 11/11     Brief hospitalization notable for acute neurologic changes resulting in RRT->Code stroke due to new right facial droop/garbled speech, of note had recently received opioids for pain.  Stroke eval negative, symptoms have improved        Problem List:   Right intertrochanteric hip fx  S/p ORIF on 11/11-appreciate ortho assistance   Has apap and low dose narcotics available for pain control      Acute neurological changes with facial droop/garbled speech   Stroke w/up is negative.  Sx persist.  D/w  who states this has been going on for the past 3-4 weeks.  Interestingly she was started on lorazepam 0.5mg at bedtime scheduled with an additional  dose every day prn.  Then she was having issues with behavior so started on depakote about 10 days ago.  In any case her symptoms are much worse.  When I see her speech is very garbled at times I can't even understand what she is saying.  RN is concerned about her ability to swallow as well  ? Med effect, ? Worsening of her myopathy-?exacerbation  -was on  lorazepam 0.5 mg at bedtime and every day prn, weaned down to 0.25 at night  and stopped prn daily dose, plan is to wean to off over the next week , stopped depakote  -neuro consultation appreciated and they feel due to lewy body dementia so initiated exelon and dropped back on her carbi/levodopa,  I discussed this with Dr Simon 11/14         *sx much improved since medication adjustments  -SLP eval  with mild oropharyngeal dysphagia with aspiration risk        Diet-easy to chew/thin liquids, aspiration precautions      Underlying neuromuscular disorder- thought due to myotonic dystrophy  Parkinson's dz  pta carbi/levodopa  2 tabs tid-> 1 tab tid due to akasthesia   Cont with pta prednisone 25 mg twice weekly (M-Th)  Follow-up with neurology on discharge from TCU      Htn/orthostatic hypotension   She's on no vasoactive medications     Dementia with anxiety   Apparently had been having  issues with anxiety prompting initiation of lorazepam 0.5 mg at bedtime and every day prn, seems like after that she started going down hill with garbled speech and began having some behavioral  disturbances prompting initiation of depakote  -weaning lorazepam to off and stopped depakote and is doing well on decreased carbi-levodopa and  initiation of exelon      hyponatremia  Mild intermittent and chronic  Appears to be at baseline         Covid   S/p 3 shot moderna series 12/2021     DVT Prophylaxis: Enoxaparin (Lovenox) SQ  Code Status: DNR / DNI  Functional Status: forgot to ask family  Diet: reg texture but needs help eating  Riddle: not needed  Access PIV         Code  Status:      DNR / DNI        Brief Hospital Stay Summary Sent Home With Patient in AVS:          Reason for your hospital stay      Right hip fracture: IM Nail                     Important Results:        As noted below          Pending Results:        Unresulted Labs Ordered in the Past 30 Days of this Admission       No orders found from 10/11/2023 to 11/11/2023.              Discharge Instructions and Follow-Up:      Follow-up Appointments     Follow-up and recommended labs and tests       Follow-up with Dr. Kemp team at [facility] approximately 2   weeks following your surgery.  Call the care coordinator at [phone]   to arrange appointment or if any questions.  If too difficult to   transport, call Dr Kemp's office to discuss options.    [facility] clinic numbers:  [place] [phone], [place] [phone], [place]   [phone]  Walk in clinic hours: 8 am - 8 pm      Follow-up with neurology clinic after discharge from TCU       *Dr Simon is who you saw while in the hospital              Discharge Disposition:        Discharged to home         Discharge Medications:        Current Discharge Medication List        START taking these medications    Details   aspirin 81 MG EC tablet Take 1 tablet (81 mg) by mouth 2 times daily  Qty: 60 tablet, Refills: 0    Associated Diagnoses: Closed displaced intertrochanteric fracture of right femur, initial encounter (H)      oxyCODONE (ROXICODONE) 5 MG tablet Take 0.5 tablets (2.5 mg) by mouth every 4 hours as needed  Qty: 15 tablet, Refills: 0    Associated Diagnoses: Closed nondisplaced intertrochanteric fracture of left femur, initial encounter (H)      rivastigmine (EXELON) 1.5 MG capsule Take 1 capsule (1.5 mg) by mouth 2 times daily    Associated Diagnoses: Moderate Lewy body dementia with anxiety (H)           CONTINUE these medications which have CHANGED    Details   acetaminophen (TYLENOL) 325 MG tablet Take 3 tablets (975 mg) by mouth every 8  hours for 5 days    Associated Diagnoses: Closed displaced intertrochanteric fracture of right femur, initial encounter (H)      carbidopa-levodopa (SINEMET)  MG tablet Take 1 tablet by mouth 3 times daily Times: 0730, 1130, 1630    Associated Diagnoses: Moderate Lewy body dementia with anxiety (H)      LORazepam (ATIVAN) 0.5 MG tablet At bedtime scheduled for one more day,  then prn for one week then stop  Qty: 7 tablet, Refills: 0    Associated Diagnoses: Anxiety           CONTINUE these medications which have NOT CHANGED    Details   alum & mag hydroxide-simethicone (MAALOX) 200-200-20 MG/5ML SUSP suspension Take 30 mLs by mouth daily as needed for indigestion      bisacodyl (DULCOLAX) 10 MG suppository Place 10 mg rectally daily as needed for constipation      DULoxetine (CYMBALTA) 60 MG capsule Take 60 mg by mouth daily      gabapentin (NEURONTIN) 100 MG capsule Take 100 mg by mouth 3 times daily 8am, 2pm, 8 pm      menthol-zinc oxide (CALMOSEPTINE) 0.44-20.6 % OINT ointment Apply topically 3 times daily as needed for skin protection      mineral oil-hydrophilic petrolatum (AQUAPHOR) external ointment Apply topically 2 times daily To lower facial rash      polyethylene glycol (MIRALAX) 17 g packet Take 17 g by mouth daily as needed for constipation  Qty: 24 packet, Refills: 0    Associated Diagnoses: Closed nondisplaced intertrochanteric fracture of left femur, initial encounter (H)      pramox-pe-glycerin-petrolatum (PREPARATION H) 1-0.25-14.4-15 % CREA cream Place rectally 2 times daily as needed for hemorrhoids      predniSONE (DELTASONE) 5 MG tablet Take 25 mg by mouth twice a week on Saturday and Sunday      senna-docusate (SENOKOT-S/PERICOLACE) 8.6-50 MG tablet Take 2 tablets by mouth 2 times daily as needed for constipation      traZODone (DESYREL) 50 MG tablet Take 50 mg by mouth at bedtime      vitamin D3 (CHOLECALCIFEROL) 50 mcg (2000 units) tablet Take 1 tablet by mouth daily      order for DME  "Equipment being ordered: Walker Wheels () and Walker ()  Treatment Diagnosis: Impaired functional mobility  Qty: 1 each, Refills: 0    Associated Diagnoses: Generalized weakness           STOP taking these medications       divalproex sodium delayed-release (DEPAKOTE) 125 MG DR tablet Comments:   Reason for Stopping:                 Allergies:         Allergies   Allergen Reactions    Amoxicillin     Hydrochlorothiazide W-Triamterene Other (See Comments)     Hyponatremia    Sertraline Other (See Comments)     Diarrhea           Consultations This Hospital Stay:        Ortho,  tele-stroke, neurology, PT/OT, SW,  SLP          Condition and Physical on Discharge:        Discharge condition: Stable   Vitals: Blood pressure 131/55, pulse 96, temperature 97.6  F (36.4  C), temperature source Temporal, resp. rate 16, height 1.651 m (5' 5\"), weight 78.7 kg (173 lb 8 oz), SpO2 92%, not currently breastfeeding.     Constitutional: Pleasant sitting in recliner nad looks stated age head  nc/at sclera clear     Lungs: Normal respiratory effort   Cardiovascular: Normal CV status   Abdomen: Tolerating po   Skin: Warm and dry no cc   Other: Affect is pleasant and  cooperated, oriented to willem          Discharge Time:      Greater than 30 minutes.        Image Results From This Hospital Stay (For Non-EPIC Providers):        Results for orders placed or performed during the hospital encounter of 11/10/23   CT Chest/Abdomen/Pelvis w Contrast    Narrative    EXAM: CT CHEST/ABDOMEN/PELVIS W CONTRAST  LOCATION: [facility]  DATE: 11/11/2023    INDICATION: fall, diffuse chest tenderness. right sided abdominal pain and hip pain  COMPARISON: None.  TECHNIQUE: CT scan of the chest, abdomen, and pelvis was performed following injection of IV contrast. Multiplanar reformats were obtained. Dose reduction techniques were used.   CONTRAST: 60mL Isovue 370    FINDINGS:   LUNGS AND PLEURA: Negative, no pleural " effusion or pneumothorax.    MEDIASTINUM/AXILLAE: Normal.    CORONARY ARTERY CALCIFICATION: Mild.    HEPATOBILIARY: Tiny hepatic cysts. No traumatic lesion evident.    PANCREAS: Normal.    SPLEEN: Normal.    ADRENAL GLANDS: Fullness of both adrenals suggesting small bilateral benign adenomas.    KIDNEYS/BLADDER: Normal.    BOWEL: No obstruction or inflammatory change. No ascites.    LYMPH NODES: Normal.    VASCULATURE: Moderate atherosclerotic plaque.    PELVIC ORGANS: Normal.    MUSCULOSKELETAL: Prior left hip ORIF. Spinal fusion.  Acute intertrochanteric fracture right hip.      Impression    IMPRESSION:  1.  Acute appearing right intertrochanteric hip fracture.  2.   No other acute/traumatic abnormality identified.   XR Femur Right 2 Views    Narrative    EXAM: XR FEMUR RIGHT 2 VIEWS  LOCATION: [facility]  DATE: 11/11/2023    INDICATION: Right lower extremity pain.  COMPARISON: X-ray pelvis single view 10/10/2020 at 1206 hours.      Impression    IMPRESSION: Interval development of acute comminuted proximal right femoral fracture, with varus deformity. Right femoral head is located in the acetabulum. Degenerative changes right hip and knee joint.   CT Head w/o Contrast    Narrative    EXAM: CT HEAD W/O CONTRAST, CT CERVICAL SPINE W/O CONTRAST  LOCATION: [facility]  DATE: 11/11/2023    INDICATION: Fall, head trauma  COMPARISON: None.  TECHNIQUE:   1) Routine CT Head without IV contrast. Multiplanar reformats. Dose reduction techniques were used.  2) Routine CT Cervical Spine without IV contrast. Multiplanar reformats. Dose reduction techniques were used.    FINDINGS:   HEAD CT:   INTRACRANIAL CONTENTS: No intracranial hemorrhage, extraaxial collection, or mass effect.  No CT evidence of acute infarct. Mild to moderate presumed chronic small vessel ischemic changes. Mild generalized volume loss. No hydrocephalus.     VISUALIZED ORBITS/SINUSES/MASTOIDS: No intraorbital  abnormality. No significant paranasal sinus mucosal disease. No middle ear or mastoid effusion.    BONES/SOFT TISSUES: No acute abnormality.    CERVICAL SPINE CT:   VERTEBRA: Straightening of the usual cervical lordosis. No acute compression fracture or posttraumatic subluxation. Osseous fusion of the C3-C4 lateral masses.    CANAL/FORAMINA: Multilevel spondylosis without high grade canal stenosis.    PARASPINAL: No prevertebral edema.      Impression    IMPRESSION:  HEAD CT:  1.  No acute intracranial process.    CERVICAL SPINE CT:  1.  No acute cervical spine fracture.   CT Cervical Spine w/o Contrast    Narrative    EXAM: CT HEAD W/O CONTRAST, CT CERVICAL SPINE W/O CONTRAST  LOCATION: [facility]  DATE: 11/11/2023    INDICATION: Fall, head trauma  COMPARISON: None.  TECHNIQUE:   1) Routine CT Head without IV contrast. Multiplanar reformats. Dose reduction techniques were used.  2) Routine CT Cervical Spine without IV contrast. Multiplanar reformats. Dose reduction techniques were used.    FINDINGS:   HEAD CT:   INTRACRANIAL CONTENTS: No intracranial hemorrhage, extraaxial collection, or mass effect.  No CT evidence of acute infarct. Mild to moderate presumed chronic small vessel ischemic changes. Mild generalized volume loss. No hydrocephalus.     VISUALIZED ORBITS/SINUSES/MASTOIDS: No intraorbital abnormality. No significant paranasal sinus mucosal disease. No middle ear or mastoid effusion.    BONES/SOFT TISSUES: No acute abnormality.    CERVICAL SPINE CT:   VERTEBRA: Straightening of the usual cervical lordosis. No acute compression fracture or posttraumatic subluxation. Osseous fusion of the C3-C4 lateral masses.    CANAL/FORAMINA: Multilevel spondylosis without high grade canal stenosis.    PARASPINAL: No prevertebral edema.      Impression    IMPRESSION:  HEAD CT:  1.  No acute intracranial process.    CERVICAL SPINE CT:  1.  No acute cervical spine fracture.   XR Elbow Right 2 Views     Narrative    EXAM: XR ELBOW RIGHT 2 VIEWS  LOCATION: [facility]  DATE: 11/11/2023    INDICATION: fall, tenderness to the lateral elbow  COMPARISON: None.      Impression    IMPRESSION: Normal joint spaces and alignment. No fracture or joint effusion.   XR Surgery XIOMY L/T 5 Min Fluoro w Stills    Narrative    This exam was marked as non-reportable because it will not be read by a   radiologist or a [place] non-radiologist provider.         CT Head Perfusion w Contrast    Narrative    EXAM: CT HEAD PERFUSION W CONTRAST  LOCATION: [facility]  DATE: 11/12/2023    INDICATION: Decreased mental status. Decreased communication. Tier 2 code stroke  COMPARISON: None.  TECHNIQUE: Head and neck CT angiogram with IV contrast. Axial helical CT images of the head and neck vessels obtained during the arterial phase of intravenous contrast administration. Axial 2D reconstructed images and multiplanar 3D MIP reconstructed   images of the head and neck vessels were performed by the technologist. Additional CT cerebral perfusion was performed utilizing a second contrast bolus. Perfusion data were post processed with generation of standard perfusion maps and estimation of   ischemic/infarcted volumes utilizing standard threshold values. Dose reduction techniques were used. All stenosis measurements made according to NASCET criteria unless otherwise specified.  CONTRAST: 50mL Isovue 370    FINDINGS:   HEAD CTA:  ANTERIOR CIRCULATION: No stenosis/occlusion, aneurysm, or high flow vascular malformation. Standard [place] anatomy.    POSTERIOR CIRCULATION: No stenosis/occlusion, aneurysm, or high flow vascular malformation. Balanced vertebral arteries supply a normal basilar artery.     DURAL VENOUS SINUSES: Expected enhancement of the major dural venous sinuses.    NECK CTA:  RIGHT CAROTID: Atherosclerotic plaque results in critical, 90% or greater, stenosis in the right ICA. No  dissection.    LEFT CAROTID: Atherosclerotic plaque results in less than 50% stenosis in the left ICA. No dissection.    VERTEBRAL ARTERIES: No focal stenosis or dissection. Balanced vertebral arteries.    AORTIC ARCH: Classic aortic arch anatomy with no significant stenosis at the origin of the great vessels.    NONVASCULAR STRUCTURES: Unremarkable.    CT PERFUSION:  PERFUSION MAPS: Symmetrical cerebral perfusion. No focal deficits in cerebral blood flow or volume to suggest ischemia/oligemia.    RAPID ANALYSIS:  CBF<30%: 0  Tmax>6sec: 0  Mismatch volume: 0  Mismatch ratio: None      Impression    IMPRESSION:   HEAD CTA:   1.  Normal CTA [place].    NECK CTA:  1.  Chronic greater than 90% stenosis in the origin of the right ICA.  2.  Chronic less than 50% stenosis in the origin of the left ICA.  3.  Normal vertebral arteries.    CT PERFUSION:  1.  Normal cerebral perfusion.    Head CT findings and head and neck CTA findings were discussed with Dr. Honeycutt at 9:16 AM hours on 11/12/2023.     CT Head w/o Contrast    Narrative    EXAM: CT HEAD W/O CONTRAST  LOCATION: [facility]  DATE: 11/12/2023    INDICATION: Altered mental status. Decreased communication. Tier 2 code stroke  COMPARISON: Head CT 11/11/2023  TECHNIQUE: Routine CT Head without IV contrast. Multiplanar reformats. Dose reduction techniques were used.    FINDINGS:  INTRACRANIAL CONTENTS: No intracranial hemorrhage, extraaxial collection, or mass effect.  No CT evidence of acute infarct. Moderate presumed chronic small vessel ischemic changes. Moderate generalized volume loss. No hydrocephalus.     VISUALIZED ORBITS/SINUSES/MASTOIDS: No intraorbital abnormality. No paranasal sinus mucosal disease. No middle ear or mastoid effusion.    BONES/SOFT TISSUES: No acute abnormality.      Impression    IMPRESSION:  1.  No CT evidence for acute intracranial process.  2.  Brain atrophy and presumed chronic microvascular ischemic  changes as above.  3.  No interval change.   CTA Head Neck w Contrast    Narrative    EXAM: CTA HEAD NECK W CONTRAST  LOCATION: [facility]  DATE: 11/12/2023    INDICATION: Decreased mental status. Decreased communication. Tier 2 code stroke  COMPARISON: None.  TECHNIQUE: Head and neck CT angiogram with IV contrast. Axial helical CT images of the head and neck vessels obtained during the arterial phase of intravenous contrast administration. Axial 2D reconstructed images and multiplanar 3D MIP reconstructed   images of the head and neck vessels were performed by the technologist. Additional CT cerebral perfusion was performed utilizing a second contrast bolus. Perfusion data were post processed with generation of standard perfusion maps and estimation of   ischemic/infarcted volumes utilizing standard threshold values. Dose reduction techniques were used. All stenosis measurements made according to NASCET criteria unless otherwise specified.  CONTRAST: 67mL Isovue 370    FINDINGS:   HEAD CTA:  ANTERIOR CIRCULATION: No stenosis/occlusion, aneurysm, or high flow vascular malformation. Standard [place] anatomy.    POSTERIOR CIRCULATION: No stenosis/occlusion, aneurysm, or high flow vascular malformation. Balanced vertebral arteries supply a normal basilar artery.     DURAL VENOUS SINUSES: Expected enhancement of the major dural venous sinuses.    NECK CTA:  RIGHT CAROTID: Atherosclerotic plaque results in critical, 90% or greater, stenosis in the right ICA. No dissection.    LEFT CAROTID: Atherosclerotic plaque results in less than 50% stenosis in the left ICA. No dissection.    VERTEBRAL ARTERIES: No focal stenosis or dissection. Balanced vertebral arteries.    AORTIC ARCH: Classic aortic arch anatomy with no significant stenosis at the origin of the great vessels.    NONVASCULAR STRUCTURES: Unremarkable.    CT PERFUSION:  PERFUSION MAPS: Symmetrical cerebral perfusion. No focal deficits  in cerebral blood flow or volume to suggest ischemia/oligemia.    RAPID ANALYSIS:  CBF<30%: 0  Tmax>6sec: 0  Mismatch volume: 0  Mismatch ratio: None      Impression    IMPRESSION:   HEAD CTA:   1.  Normal CTA [place].    NECK CTA:  1.  Chronic greater than 90% stenosis in the origin of the right ICA.  2.  Chronic less than 50% stenosis in the origin of the left ICA.  3.  Normal vertebral arteries.    CT PERFUSION:  1.  Normal cerebral perfusion.    Head CT findings and head and neck CTA findings were discussed with Dr. Honeycutt at 9:16 AM hours on 11/12/2023.   MR Brain w/o & w Contrast    Narrative    MRI BRAIN WITHOUT AND WITH CONTRAST  11/13/2023 9:41 AM     HISTORY:  AMS, garbled speech, less responsive.    TECHNIQUE:  Multiplanar, multisequence MRI of the brain without and  with 8 mL Gadavist.     COMPARISON: Head CT 11/12/2023.     FINDINGS: There is no evidence of acute infarct, hemorrhage, mass, or  herniation. Moderate diffuse parenchymal volume loss. Moderate patchy  deep and subcortical white matter T2 hyperintensities which are  nonspecific, but likely related to chronic microvascular ischemic  disease. Ventricular size within normal limits without hydrocephalus.     There is no abnormal intracranial enhancement.     The facial structures appear normal.       Impression    IMPRESSION:    1. No evidence of acute infarct, mass, hemorrhage, or herniation.  2. Moderate diffuse parenchymal volume loss and white matter changes  likely due to chronic microvascular ischemic disease.     JEANMARIE ADAMES MD         SYSTEM ID:  [id number]   XR Abdomen 2 Views    Narrative    XR ABDOMEN 2 VIEWS 11/14/2023 12:20 PM    HISTORY: abdominal pain    COMPARISON: Pelvis radiograph 10/10/2020    FINDINGS: Nonobstructive bowel gas pattern. Mild amount of formed  stool in the colon. No air-fluid levels. No free air. Diffuse  demineralization of the bones. Lower lumbar spine PLIF. Postop changes  in both hips.    ANDREW  YOU YO MD         SYSTEM ID:  [id number]           Most Recent Lab Results In EPIC (For Non-EPIC Providers):    Most Recent 3 CBC's:  Recent Labs   Lab Test 11/16/23  0639 11/15/23  0655 11/14/23  0659   WBC 10.3 9.9 10.6   HGB 9.2* 10.2* 9.0*   MCV 90 92 92    405 312      Most Recent 3 BMP's:  Recent Labs   Lab Test 11/16/23  0639 11/15/23  0655 11/14/23  0659   * 135 138   POTASSIUM 4.2 4.4 4.1   CHLORIDE 98 97* 100   CO2 28 29 30*   BUN 20.9 19.2 21.1   CR 0.46* 0.44* 0.44*   ANIONGAP 8 9 8   ANN-MARIE 8.6* 9.1 8.7*   * 115* 115*     Most Recent 3 INR's:  Recent Labs   Lab Test 11/11/23  0517 10/10/20  1407   INR 0.92 0.95     Most Recent 2 LFT's:  Recent Labs   Lab Test 11/14/23  0659 06/15/22  1046   AST 30 15   ALT 8 <9   ALKPHOS 80 94   BILITOTAL 0.3 0.3

## 2023-11-16 NOTE — PLAN OF CARE
Goal Outcome Evaluation:   Oriented to self only. Intermittent confusion and intermittently able to answer easy questions,   Plan is to TCU today.    requested I give her a flu shot done.     VSS. Room air. Voided.   Up in chair with the ceiling lift 2 assist.    Able to lift all extremities off bed.    at the bedside.   Aquacel dressing clean and dry to right hip. Blister 1 inch intact  right hip.   Ate poor to fair, helped with meals. Encouraged fluids all day.   Assisted/dependent with all cares.   Isoflex air canister hooked up to the mattress.   Tylenol for comfort.        Plan of Care Reviewed With: patient, spouse     Overall Patient Progress: improvingOverall Patient Progress: improving      Patient vital signs are at baseline: Yes  Patient able to ambulate as they were prior to admission or with assist devices provided by therapies during their stay:  No,  Reason:  lift  Patient MUST void prior to discharge:  Yes. Encouraged fluids.   Patient able to tolerate oral intake:  Yes  Pain has adequate pain control using Oral analgesics:  Yes  Does patient have an identified :  Yes. Dimitry at bedside.   Has goal D/C date and time been discussed with patient:  Yes . CC following.   [facility]. Written discharge instructions given to  and patient and  in agreement of discharge plan.   1712 discharge to [facility]. [place]. Confused.  at the bedside. VSS.

## 2023-11-16 NOTE — PLAN OF CARE
Goal Outcome Evaluation:      Plan of Care Reviewed With: patient    Overall Patient Progress: improvingOverall Patient Progress: improving     Patient vital signs are at baseline: Yes  Patient able to ambulate as they were prior to admission or with assist devices provided by therapies during their stay:  No,  Reason:  Assist x2 with magy steady/Lift  Patient MUST void prior to discharge:  Yes  Patient able to tolerate oral intake:  Yes  Pain has adequate pain control using Oral analgesics:  Yes  Does patient have an identified :  Yes  Has goal D/C date and time been discussed with patient:  Yes    Pt alert to self triston. VSS. PIV SL. Pain managed with PRN oxycodone. Assist x2 with gait belt and magy steady/lift. Voiding well, incontinent. Tolerating a soft/bit size diet with thin liquids well, feeder. ROBINSON CMS due to confusion. Dressing CDI. Plan is TCU at discharge.

## 2023-11-16 NOTE — PROGRESS NOTES
Care Management Discharge Note    Discharge Date: 11/16/2023       Discharge Disposition: Transitional Care    Discharge Services:  rehab    Discharge DME:      Discharge Transportation: agency    Private pay costs discussed: private room/amenity fees and transportation costs    Does the patient's insurance plan have a 3 day qualifying hospital stay waiver?  No    PAS Confirmation Code: [id number]  Patient/family educated on Medicare website which has current facility and service quality ratings: yes    Education Provided on the Discharge Plan:    Persons Notified of Discharge Plans: patient, spouse  Patient/Family in Agreement with the Plan: yes    Handoff Referral Completed: No    Additional Information:  Patient accepted at [facility] for TCU and they can accept patient today. Updated MD and discharge orders placed. Updated spouse, Herb.   Wheelchair transport set up for today between [id number].   Updated care team and Lucrecia at [facility] of transport time.  Discharge orders faxed over.   Left voicemail with [facility] to update that patient transferring to [facility] for TCU.     Anabelle Causey RN  Care Coordinator  [facility]

## 2023-11-17 ENCOUNTER — PATIENT OUTREACH (OUTPATIENT)
Dept: CARE COORDINATION | Facility: CLINIC | Age: 78
End: 2023-11-17
Payer: MEDICARE

## 2023-11-17 PROCEDURE — P9604 ONE-WAY ALLOW PRORATED TRIP: HCPCS | Performed by: INTERNAL MEDICINE

## 2023-11-17 PROCEDURE — 36415 COLL VENOUS BLD VENIPUNCTURE: CPT | Performed by: INTERNAL MEDICINE

## 2023-11-17 PROCEDURE — 86481 TB AG RESPONSE T-CELL SUSP: CPT | Performed by: INTERNAL MEDICINE

## 2023-11-17 NOTE — PROGRESS NOTES
[facility]cted [facility] Resource [facility]    Background: Transitional Care Management program identified per system criteria and reviewed by Conne[facility] Resource Center team for possible outreach.    Assessment: Upon chart review, CCRC Team member will not proceed with patient outreach related to this episode of Transitional Care Management program due to reason below:    Non-[facility] TCU: CCRC team member noted patient discharged to TCU/ARU/LTACH. Patient is not established with a [facility] currently supported by Primary Care-Care Coordination therefore handoff to Primary Care-Care Coordination is not appropriate at this time.    Plan: Transitional Care Management episode addressed appropriately per reason noted above.      JOANNE Toney  Connected Care Resource [place], [facility]    *Connected Care Resource Team does NOT follow patient ongoing. Referrals are identified based on internal discharge reports and the outreach is to ensure patient has an understanding of their discharge instructions.

## 2023-11-17 NOTE — PROGRESS NOTES
Speech Language Therapy Discharge Summary    Reason for therapy discharge:    Discharged to transitional care facility.    Progress towards therapy goal(s). See goals on Care Plan in [facility] electronic health record for goal details.  Goals partially met.  Barriers to achieving goals:   discharge from facility.    Therapy recommendation(s):    Continued therapy is recommended.  Rationale/Recommendations:  recommend continued SLP at TCU to work toward safe advancement to least restrictive diet.    Recommend continue a soft and bite sized diet (IDDSI 6) and thin liquid (0). Rec small bites/sips, alternated consistencies & slow pacing. Ensure oral residue is cleared before giving another bite. Pills crushed and served in puree if medically appropriate.

## 2023-11-17 NOTE — PLAN OF CARE
Physical Therapy Discharge Summary    Reason for therapy discharge:    Discharged to transitional care facility.    Progress towards therapy goal(s). See goals on Care Plan in [facility] electronic health record for goal details.  Goals not met.  Barriers to achieving goals:   discharge from facility.    Therapy recommendation(s):    Continued therapy is recommended.  Rationale/Recommendations:  Recommend continued therapy at TCU to progress independence with mobility.

## 2023-11-18 LAB
GAMMA INTERFERON BACKGROUND BLD IA-ACNC: 0.02 IU/ML
M TB IFN-G BLD-IMP: NEGATIVE
M TB IFN-G CD4+ BCKGRND COR BLD-ACNC: 1.54 IU/ML
MITOGEN IGNF BCKGRD COR BLD-ACNC: 0 IU/ML
MITOGEN IGNF BCKGRD COR BLD-ACNC: 0 IU/ML
QUANTIFERON MITOGEN: 1.56 IU/ML
QUANTIFERON NIL TUBE: 0.02 IU/ML
QUANTIFERON TB1 TUBE: 0.02 IU/ML
QUANTIFERON TB2 TUBE: 0.02

## 2023-11-21 ENCOUNTER — DOCUMENTATION ONLY (OUTPATIENT)
Dept: OTHER | Facility: CLINIC | Age: 78
End: 2023-11-21
Payer: MEDICARE

## 2023-12-15 PROCEDURE — 81001 URINALYSIS AUTO W/SCOPE: CPT | Performed by: FAMILY MEDICINE

## 2023-12-16 ENCOUNTER — LAB REQUISITION (OUTPATIENT)
Dept: LAB | Facility: CLINIC | Age: 78
End: 2023-12-16

## 2023-12-16 LAB
ALBUMIN UR-MCNC: 10 MG/DL
APPEARANCE UR: ABNORMAL
BILIRUB UR QL STRIP: NEGATIVE
CAOX CRY #/AREA URNS HPF: ABNORMAL /HPF
COLOR UR AUTO: YELLOW
GLUCOSE UR STRIP-MCNC: NEGATIVE MG/DL
HGB UR QL STRIP: NEGATIVE
HYALINE CASTS: 1 /LPF
KETONES UR STRIP-MCNC: NEGATIVE MG/DL
LEUKOCYTE ESTERASE UR QL STRIP: NEGATIVE
MUCOUS THREADS #/AREA URNS LPF: PRESENT /LPF
NITRATE UR QL: NEGATIVE
PH UR STRIP: 6.5 [PH] (ref 5–7)
RBC URINE: 10 /HPF
SP GR UR STRIP: 1.02 (ref 1–1.03)
SQUAMOUS EPITHELIAL: 1 /HPF
TRANSITIONAL EPI: <1 /HPF
UROBILINOGEN UR STRIP-MCNC: 2 MG/DL
WBC URINE: 2 /HPF

## 2024-02-09 ENCOUNTER — LAB REQUISITION (OUTPATIENT)
Dept: LAB | Facility: CLINIC | Age: 79
End: 2024-02-09
Payer: MEDICARE

## 2024-02-09 DIAGNOSIS — D64.9 ANEMIA, UNSPECIFIED: ICD-10-CM

## 2024-02-12 LAB
ERYTHROCYTE [DISTWIDTH] IN BLOOD BY AUTOMATED COUNT: 16.4 % (ref 10–15)
HCT VFR BLD AUTO: 28.5 % (ref 35–47)
HGB BLD-MCNC: 10.3 G/DL (ref 11.7–15.7)
MCH RBC QN AUTO: 32.4 PG (ref 26.5–33)
MCHC RBC AUTO-ENTMCNC: 36.1 G/DL (ref 31.5–36.5)
MCV RBC AUTO: 90 FL (ref 78–100)
PLATELET # BLD AUTO: 567 10E3/UL (ref 150–450)
RBC # BLD AUTO: 3.18 10E6/UL (ref 3.8–5.2)
WBC # BLD AUTO: 12.4 10E3/UL (ref 4–11)

## 2024-02-12 PROCEDURE — P9603 ONE-WAY ALLOW PRORATED MILES: HCPCS | Performed by: FAMILY MEDICINE

## 2024-02-12 PROCEDURE — 85048 AUTOMATED LEUKOCYTE COUNT: CPT | Performed by: FAMILY MEDICINE

## 2024-02-12 PROCEDURE — 36415 COLL VENOUS BLD VENIPUNCTURE: CPT | Performed by: FAMILY MEDICINE

## 2024-02-19 ENCOUNTER — LAB REQUISITION (OUTPATIENT)
Dept: LAB | Facility: CLINIC | Age: 79
End: 2024-02-19
Payer: MEDICARE

## 2024-02-19 DIAGNOSIS — D72.829 ELEVATED WHITE BLOOD CELL COUNT, UNSPECIFIED: ICD-10-CM

## 2024-02-20 LAB
BASOPHILS # BLD AUTO: 0.1 10E3/UL (ref 0–0.2)
BASOPHILS NFR BLD AUTO: 1 %
EOSINOPHIL # BLD AUTO: 0.2 10E3/UL (ref 0–0.7)
EOSINOPHIL NFR BLD AUTO: 2 %
ERYTHROCYTE [DISTWIDTH] IN BLOOD BY AUTOMATED COUNT: 16.9 % (ref 10–15)
HCT VFR BLD AUTO: 28.9 % (ref 35–47)
HGB BLD-MCNC: 10.4 G/DL (ref 11.7–15.7)
IMM GRANULOCYTES # BLD: 0.1 10E3/UL
IMM GRANULOCYTES NFR BLD: 1 %
LYMPHOCYTES # BLD AUTO: 2.3 10E3/UL (ref 0.8–5.3)
LYMPHOCYTES NFR BLD AUTO: 21 %
MCH RBC QN AUTO: 31.4 PG (ref 26.5–33)
MCHC RBC AUTO-ENTMCNC: 36 G/DL (ref 31.5–36.5)
MCV RBC AUTO: 87 FL (ref 78–100)
MONOCYTES # BLD AUTO: 0.8 10E3/UL (ref 0–1.3)
MONOCYTES NFR BLD AUTO: 7 %
NEUTROPHILS # BLD AUTO: 7.2 10E3/UL (ref 1.6–8.3)
NEUTROPHILS NFR BLD AUTO: 68 %
NRBC # BLD AUTO: 0 10E3/UL
NRBC BLD AUTO-RTO: 0 /100
PLATELET # BLD AUTO: 445 10E3/UL (ref 150–450)
RBC # BLD AUTO: 3.31 10E6/UL (ref 3.8–5.2)
WBC # BLD AUTO: 10.5 10E3/UL (ref 4–11)

## 2024-02-20 PROCEDURE — P9604 ONE-WAY ALLOW PRORATED TRIP: HCPCS | Mod: ORL | Performed by: FAMILY MEDICINE

## 2024-02-20 PROCEDURE — 36415 COLL VENOUS BLD VENIPUNCTURE: CPT | Mod: ORL | Performed by: FAMILY MEDICINE

## 2024-02-20 PROCEDURE — 85025 COMPLETE CBC W/AUTO DIFF WBC: CPT | Mod: ORL | Performed by: FAMILY MEDICINE

## 2024-04-07 ENCOUNTER — LAB REQUISITION (OUTPATIENT)
Dept: LAB | Facility: CLINIC | Age: 79
End: 2024-04-07
Payer: MEDICARE

## 2024-04-07 DIAGNOSIS — N18.9 CHRONIC KIDNEY DISEASE, UNSPECIFIED: ICD-10-CM

## 2024-04-09 LAB
ANION GAP SERPL CALCULATED.3IONS-SCNC: 12 MMOL/L (ref 7–15)
BUN SERPL-MCNC: 13.6 MG/DL (ref 8–23)
CALCIUM SERPL-MCNC: 9.6 MG/DL (ref 8.8–10.2)
CHLORIDE SERPL-SCNC: 97 MMOL/L (ref 98–107)
CREAT SERPL-MCNC: 0.35 MG/DL (ref 0.51–0.95)
DEPRECATED HCO3 PLAS-SCNC: 29 MMOL/L (ref 22–29)
EGFRCR SERPLBLD CKD-EPI 2021: >90 ML/MIN/1.73M2
GLUCOSE SERPL-MCNC: 94 MG/DL (ref 70–99)
MAGNESIUM SERPL-MCNC: 2 MG/DL (ref 1.7–2.3)
POTASSIUM SERPL-SCNC: 3.2 MMOL/L (ref 3.4–5.3)
SODIUM SERPL-SCNC: 138 MMOL/L (ref 135–145)

## 2024-04-09 PROCEDURE — 36415 COLL VENOUS BLD VENIPUNCTURE: CPT | Mod: ORL | Performed by: NURSE PRACTITIONER

## 2024-04-09 PROCEDURE — 83735 ASSAY OF MAGNESIUM: CPT | Mod: ORL | Performed by: NURSE PRACTITIONER

## 2024-04-09 PROCEDURE — 80048 BASIC METABOLIC PNL TOTAL CA: CPT | Mod: ORL | Performed by: NURSE PRACTITIONER

## 2024-04-09 PROCEDURE — P9604 ONE-WAY ALLOW PRORATED TRIP: HCPCS | Mod: ORL | Performed by: NURSE PRACTITIONER

## 2024-04-12 ENCOUNTER — LAB REQUISITION (OUTPATIENT)
Dept: LAB | Facility: CLINIC | Age: 79
End: 2024-04-12
Payer: MEDICARE

## 2024-04-12 DIAGNOSIS — E87.6 HYPOKALEMIA: ICD-10-CM

## 2024-04-15 LAB — POTASSIUM SERPL-SCNC: 4.2 MMOL/L (ref 3.4–5.3)

## 2024-04-15 PROCEDURE — 36415 COLL VENOUS BLD VENIPUNCTURE: CPT | Mod: ORL | Performed by: FAMILY MEDICINE

## 2024-04-15 PROCEDURE — 84132 ASSAY OF SERUM POTASSIUM: CPT | Mod: ORL | Performed by: FAMILY MEDICINE

## 2024-04-15 PROCEDURE — P9603 ONE-WAY ALLOW PRORATED MILES: HCPCS | Mod: ORL | Performed by: FAMILY MEDICINE

## 2024-06-18 NOTE — PROGRESS NOTES
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     She underwent ORIF on 11/11    Brief hospitalization notable for acute neurologic changes resulting in RRT->Code stroke due to new right facial droop/garbled speech, of note had recently received opioids for pain.  Stroke eval negative, symptoms have improved      Problem List:   Right intertrochanteric hip fx  S/p ORIF on 11/11-appreciate ortho assistance   Has apap and low dose narcotics available for pain control       Acute neurological changes with facial droop/garbled speech   Stroke w/up is negative.  Sx persist.  D/w  who states this has been going on for the past 3-4 weeks.  Interestingly she was started on lorazepam 0.5mg at bedtime scheduled with an additional dose every day prn.  Then she was having issues with behavior so started on depakote about 10 days ago.  In any case her symptoms are much worse.  When I see her speech is very garbled at times I can't even understand what she is saying.  RN is concerned about her ability to swallow as well  ? Med effect, ? 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 Mccullough today.  The following has been recommended for you based on the results of your appointment:     -Please fill out the bladder diary that was given to you today.   -Follow-up with one of our PA's in the next 2-3 months for a symptom check.  Remember to bring your 24 hour voiding diary with you to your follow-up .    Please reach out to us with any questions!  [phone]    Thank you,  Roshni Pearson LPN    "time    hyponatremia  Mild intermittent and chronic  Appears to be at baseline       Covid   S/p 3 shot moderna series 12/2021    DVT Prophylaxis: Enoxaparin (Lovenox) SQ  Code Status: DNR / DNI  Functional Status:  Diet:  Riddle:  Access    Disposition Plan   Expected discharge in tbd days to  tbs  once above sorted out .     Entered: Hina Dean MD 11/13/2023, 11:53 AM       Securely message with [employer] (more info)  Text page via [employer] Paging/Directory     Discussed with , granddtr and granddtr's SO (both of whom are nurses    I spent 50 minutes reviewing epic including prior labs/imaging/medical history and notes related to this encounter  In addition time was spent in interveiwing the patient, communicating with contacts, and medical decision making      Interval History (Subjective):      She wants to get out of here.  Basically just tells me to leave                   Physical Exam:      Last Vital Signs:  BP (!) 153/61   Pulse 95   Temp 96.8  F (36  C) (Temporal)   Resp 20   Wt 79 kg (174 lb 2.6 oz)   SpO2 94%   Breastfeeding No   BMI 29.90 kg/m      I/O:  Speech very garbled at times unintelligible.  Nad looks stated age does look like she has a mild left facial droop. Lungs ctab nl effort rrr no mrg no le edema skin warm and dry no cyanosis or clubbing alert, orientation is unclear.  l           Medications:      All current medications were reviewed with changes reflected in problem list.         Data:      All new lab and imaging data was reviewed.   Labs:  Recent Labs   Lab 11/13/23  0706      POTASSIUM 4.3   CHLORIDE 100   CO2 29   ANIONGAP 6*   GLC 86  86   BUN 21.3   CR 0.52   GFRESTIMATED >90   ANN-MARIE 8.7*     Recent Labs   Lab 11/13/23  0706   WBC 10.6   HGB 8.8*   HCT 27.9*   MCV 92        Recent Labs   Lab 11/13/23  0706 11/12/23  0835 11/12/23  0722 11/11/23  0517 11/10/23  2344   GLC 86  86 103* 109*  109* 106* 97     No results for input(s): \"AST\", \"ALT\", \"GGT\", " "\"ALKPHOS\", \"BILITOTAL\", \"BILICONJ\", \"BILIDIRECT\", \"ADAMA\" in the last 168 hours.    Invalid input(s): \"BILIRUBININDIRECT\"   Imaging:   Results for orders placed or performed during the hospital encounter of 11/10/23   CT Chest/Abdomen/Pelvis w Contrast    Narrative    EXAM: CT CHEST/ABDOMEN/PELVIS W CONTRAST  LOCATION: [facility]  DATE: 11/11/2023    INDICATION: fall, diffuse chest tenderness. right sided abdominal pain and hip pain  COMPARISON: None.  TECHNIQUE: CT scan of the chest, abdomen, and pelvis was performed following injection of IV contrast. Multiplanar reformats were obtained. Dose reduction techniques were used.   CONTRAST: 60mL Isovue 370    FINDINGS:   LUNGS AND PLEURA: Negative, no pleural effusion or pneumothorax.    MEDIASTINUM/AXILLAE: Normal.    CORONARY ARTERY CALCIFICATION: Mild.    HEPATOBILIARY: Tiny hepatic cysts. No traumatic lesion evident.    PANCREAS: Normal.    SPLEEN: Normal.    ADRENAL GLANDS: Fullness of both adrenals suggesting small bilateral benign adenomas.    KIDNEYS/BLADDER: Normal.    BOWEL: No obstruction or inflammatory change. No ascites.    LYMPH NODES: Normal.    VASCULATURE: Moderate atherosclerotic plaque.    PELVIC ORGANS: Normal.    MUSCULOSKELETAL: Prior left hip ORIF. Spinal fusion.  Acute intertrochanteric fracture right hip.      Impression    IMPRESSION:  1.  Acute appearing right intertrochanteric hip fracture.  2.   No other acute/traumatic abnormality identified.   XR Femur Right 2 Views    Narrative    EXAM: XR FEMUR RIGHT 2 VIEWS  LOCATION: [facility]  DATE: 11/11/2023    INDICATION: Right lower extremity pain.  COMPARISON: X-ray pelvis single view 10/10/2020 at 1206 hours.      Impression    IMPRESSION: Interval development of acute comminuted proximal right femoral fracture, with varus deformity. Right femoral head is located in the acetabulum. Degenerative changes right hip and knee joint.   CT Head w/o " Contrast    Narrative    EXAM: CT HEAD W/O CONTRAST, CT CERVICAL SPINE W/O CONTRAST  LOCATION: [facility]  DATE: 11/11/2023    INDICATION: Fall, head trauma  COMPARISON: None.  TECHNIQUE:   1) Routine CT Head without IV contrast. Multiplanar reformats. Dose reduction techniques were used.  2) Routine CT Cervical Spine without IV contrast. Multiplanar reformats. Dose reduction techniques were used.    FINDINGS:   HEAD CT:   INTRACRANIAL CONTENTS: No intracranial hemorrhage, extraaxial collection, or mass effect.  No CT evidence of acute infarct. Mild to moderate presumed chronic small vessel ischemic changes. Mild generalized volume loss. No hydrocephalus.     VISUALIZED ORBITS/SINUSES/MASTOIDS: No intraorbital abnormality. No significant paranasal sinus mucosal disease. No middle ear or mastoid effusion.    BONES/SOFT TISSUES: No acute abnormality.    CERVICAL SPINE CT:   VERTEBRA: Straightening of the usual cervical lordosis. No acute compression fracture or posttraumatic subluxation. Osseous fusion of the C3-C4 lateral masses.    CANAL/FORAMINA: Multilevel spondylosis without high grade canal stenosis.    PARASPINAL: No prevertebral edema.      Impression    IMPRESSION:  HEAD CT:  1.  No acute intracranial process.    CERVICAL SPINE CT:  1.  No acute cervical spine fracture.   CT Cervical Spine w/o Contrast    Narrative    EXAM: CT HEAD W/O CONTRAST, CT CERVICAL SPINE W/O CONTRAST  LOCATION: [facility]  DATE: 11/11/2023    INDICATION: Fall, head trauma  COMPARISON: None.  TECHNIQUE:   1) Routine CT Head without IV contrast. Multiplanar reformats. Dose reduction techniques were used.  2) Routine CT Cervical Spine without IV contrast. Multiplanar reformats. Dose reduction techniques were used.    FINDINGS:   HEAD CT:   INTRACRANIAL CONTENTS: No intracranial hemorrhage, extraaxial collection, or mass effect.  No CT evidence of acute infarct. Mild to moderate presumed  chronic small vessel ischemic changes. Mild generalized volume loss. No hydrocephalus.     VISUALIZED ORBITS/SINUSES/MASTOIDS: No intraorbital abnormality. No significant paranasal sinus mucosal disease. No middle ear or mastoid effusion.    BONES/SOFT TISSUES: No acute abnormality.    CERVICAL SPINE CT:   VERTEBRA: Straightening of the usual cervical lordosis. No acute compression fracture or posttraumatic subluxation. Osseous fusion of the C3-C4 lateral masses.    CANAL/FORAMINA: Multilevel spondylosis without high grade canal stenosis.    PARASPINAL: No prevertebral edema.      Impression    IMPRESSION:  HEAD CT:  1.  No acute intracranial process.    CERVICAL SPINE CT:  1.  No acute cervical spine fracture.   XR Elbow Right 2 Views    Narrative    EXAM: XR ELBOW RIGHT 2 VIEWS  LOCATION: [facility]  DATE: 11/11/2023    INDICATION: fall, tenderness to the lateral elbow  COMPARISON: None.      Impression    IMPRESSION: Normal joint spaces and alignment. No fracture or joint effusion.   XR Surgery XIOMY L/T 5 Min Fluoro w Stills    Narrative    This exam was marked as non-reportable because it will not be read by a   radiologist or a [place] non-radiologist provider.         CT Head Perfusion w Contrast    Narrative    EXAM: CT HEAD PERFUSION W CONTRAST  LOCATION: [facility]  DATE: 11/12/2023    INDICATION: Decreased mental status. Decreased communication. Tier 2 code stroke  COMPARISON: None.  TECHNIQUE: Head and neck CT angiogram with IV contrast. Axial helical CT images of the head and neck vessels obtained during the arterial phase of intravenous contrast administration. Axial 2D reconstructed images and multiplanar 3D MIP reconstructed   images of the head and neck vessels were performed by the technologist. Additional CT cerebral perfusion was performed utilizing a second contrast bolus. Perfusion data were post processed with generation of standard perfusion maps  and estimation of   ischemic/infarcted volumes utilizing standard threshold values. Dose reduction techniques were used. All stenosis measurements made according to NASCET criteria unless otherwise specified.  CONTRAST: 50mL Isovue 370    FINDINGS:   HEAD CTA:  ANTERIOR CIRCULATION: No stenosis/occlusion, aneurysm, or high flow vascular malformation. Standard [place] anatomy.    POSTERIOR CIRCULATION: No stenosis/occlusion, aneurysm, or high flow vascular malformation. Balanced vertebral arteries supply a normal basilar artery.     DURAL VENOUS SINUSES: Expected enhancement of the major dural venous sinuses.    NECK CTA:  RIGHT CAROTID: Atherosclerotic plaque results in critical, 90% or greater, stenosis in the right ICA. No dissection.    LEFT CAROTID: Atherosclerotic plaque results in less than 50% stenosis in the left ICA. No dissection.    VERTEBRAL ARTERIES: No focal stenosis or dissection. Balanced vertebral arteries.    AORTIC ARCH: Classic aortic arch anatomy with no significant stenosis at the origin of the great vessels.    NONVASCULAR STRUCTURES: Unremarkable.    CT PERFUSION:  PERFUSION MAPS: Symmetrical cerebral perfusion. No focal deficits in cerebral blood flow or volume to suggest ischemia/oligemia.    RAPID ANALYSIS:  CBF<30%: 0  Tmax>6sec: 0  Mismatch volume: 0  Mismatch ratio: None      Impression    IMPRESSION:   HEAD CTA:   1.  Normal CTA [place].    NECK CTA:  1.  Chronic greater than 90% stenosis in the origin of the right ICA.  2.  Chronic less than 50% stenosis in the origin of the left ICA.  3.  Normal vertebral arteries.    CT PERFUSION:  1.  Normal cerebral perfusion.    Head CT findings and head and neck CTA findings were discussed with Dr. Honeycutt at 9:16 AM hours on 11/12/2023.     CT Head w/o Contrast    Narrative    EXAM: CT HEAD W/O CONTRAST  LOCATION: [facility]  DATE: 11/12/2023    INDICATION: Altered mental status. Decreased communication.  Tier 2 code stroke  COMPARISON: Head CT 11/11/2023  TECHNIQUE: Routine CT Head without IV contrast. Multiplanar reformats. Dose reduction techniques were used.    FINDINGS:  INTRACRANIAL CONTENTS: No intracranial hemorrhage, extraaxial collection, or mass effect.  No CT evidence of acute infarct. Moderate presumed chronic small vessel ischemic changes. Moderate generalized volume loss. No hydrocephalus.     VISUALIZED ORBITS/SINUSES/MASTOIDS: No intraorbital abnormality. No paranasal sinus mucosal disease. No middle ear or mastoid effusion.    BONES/SOFT TISSUES: No acute abnormality.      Impression    IMPRESSION:  1.  No CT evidence for acute intracranial process.  2.  Brain atrophy and presumed chronic microvascular ischemic changes as above.  3.  No interval change.   CTA Head Neck w Contrast    Narrative    EXAM: CTA HEAD NECK W CONTRAST  LOCATION: [facility]  DATE: 11/12/2023    INDICATION: Decreased mental status. Decreased communication. Tier 2 code stroke  COMPARISON: None.  TECHNIQUE: Head and neck CT angiogram with IV contrast. Axial helical CT images of the head and neck vessels obtained during the arterial phase of intravenous contrast administration. Axial 2D reconstructed images and multiplanar 3D MIP reconstructed   images of the head and neck vessels were performed by the technologist. Additional CT cerebral perfusion was performed utilizing a second contrast bolus. Perfusion data were post processed with generation of standard perfusion maps and estimation of   ischemic/infarcted volumes utilizing standard threshold values. Dose reduction techniques were used. All stenosis measurements made according to NASCET criteria unless otherwise specified.  CONTRAST: 67mL Isovue 370    FINDINGS:   HEAD CTA:  ANTERIOR CIRCULATION: No stenosis/occlusion, aneurysm, or high flow vascular malformation. Standard [place] anatomy.    POSTERIOR CIRCULATION: No stenosis/occlusion,  aneurysm, or high flow vascular malformation. Balanced vertebral arteries supply a normal basilar artery.     DURAL VENOUS SINUSES: Expected enhancement of the major dural venous sinuses.    NECK CTA:  RIGHT CAROTID: Atherosclerotic plaque results in critical, 90% or greater, stenosis in the right ICA. No dissection.    LEFT CAROTID: Atherosclerotic plaque results in less than 50% stenosis in the left ICA. No dissection.    VERTEBRAL ARTERIES: No focal stenosis or dissection. Balanced vertebral arteries.    AORTIC ARCH: Classic aortic arch anatomy with no significant stenosis at the origin of the great vessels.    NONVASCULAR STRUCTURES: Unremarkable.    CT PERFUSION:  PERFUSION MAPS: Symmetrical cerebral perfusion. No focal deficits in cerebral blood flow or volume to suggest ischemia/oligemia.    RAPID ANALYSIS:  CBF<30%: 0  Tmax>6sec: 0  Mismatch volume: 0  Mismatch ratio: None      Impression    IMPRESSION:   HEAD CTA:   1.  Normal CTA [place].    NECK CTA:  1.  Chronic greater than 90% stenosis in the origin of the right ICA.  2.  Chronic less than 50% stenosis in the origin of the left ICA.  3.  Normal vertebral arteries.    CT PERFUSION:  1.  Normal cerebral perfusion.    Head CT findings and head and neck CTA findings were discussed with Dr. Honeycutt at 9:16 AM hours on 11/12/2023.   MR Brain w/o & w Contrast    Narrative    MRI BRAIN WITHOUT AND WITH CONTRAST  11/13/2023 9:41 AM     HISTORY:  AMS, garbled speech, less responsive.    TECHNIQUE:  Multiplanar, multisequence MRI of the brain without and  with 8 mL Gadavist.     COMPARISON: Head CT 11/12/2023.     FINDINGS: There is no evidence of acute infarct, hemorrhage, mass, or  herniation. Moderate diffuse parenchymal volume loss. Moderate patchy  deep and subcortical white matter T2 hyperintensities which are  nonspecific, but likely related to chronic microvascular ischemic  disease. Ventricular size within normal limits without hydrocephalus.      There is no abnormal intracranial enhancement.     The facial structures appear normal.       Impression    IMPRESSION:    1. No evidence of acute infarct, mass, hemorrhage, or herniation.  2. Moderate diffuse parenchymal volume loss and white matter changes  likely due to chronic microvascular ischemic disease.     JEANMARIE ADAMES MD         SYSTEM ID:  [id number]

## 2024-07-17 ENCOUNTER — LAB REQUISITION (OUTPATIENT)
Dept: LAB | Facility: CLINIC | Age: 79
End: 2024-07-17
Payer: MEDICARE

## 2024-07-17 DIAGNOSIS — F02.84 DEMENTIA IN OTHER DISEASES CLASSIFIED ELSEWHERE, UNSPECIFIED SEVERITY, WITH ANXIETY (H): ICD-10-CM

## 2024-07-17 DIAGNOSIS — S72.141D DISPLACED INTERTROCHANTERIC FRACTURE OF RIGHT FEMUR, SUBSEQUENT ENCOUNTER FOR CLOSED FRACTURE WITH ROUTINE HEALING: ICD-10-CM

## 2024-07-17 DIAGNOSIS — Z91.81 HISTORY OF FALLING: ICD-10-CM

## 2024-07-17 DIAGNOSIS — E87.6 HYPOKALEMIA: ICD-10-CM

## 2024-07-18 LAB
ERYTHROCYTE [DISTWIDTH] IN BLOOD BY AUTOMATED COUNT: 14.6 % (ref 10–15)
HCT VFR BLD AUTO: 32.2 % (ref 35–47)
HGB BLD-MCNC: 10.5 G/DL (ref 11.7–15.7)
MCH RBC QN AUTO: 30.2 PG (ref 26.5–33)
MCHC RBC AUTO-ENTMCNC: 32.6 G/DL (ref 31.5–36.5)
MCV RBC AUTO: 93 FL (ref 78–100)
PLATELET # BLD AUTO: 401 10E3/UL (ref 150–450)
POTASSIUM SERPL-SCNC: 3.8 MMOL/L (ref 3.4–5.3)
RBC # BLD AUTO: 3.48 10E6/UL (ref 3.8–5.2)
WBC # BLD AUTO: 9.4 10E3/UL (ref 4–11)

## 2024-07-18 PROCEDURE — P9603 ONE-WAY ALLOW PRORATED MILES: HCPCS | Mod: ORL | Performed by: FAMILY MEDICINE

## 2024-07-18 PROCEDURE — 36415 COLL VENOUS BLD VENIPUNCTURE: CPT | Mod: ORL | Performed by: FAMILY MEDICINE

## 2024-07-18 PROCEDURE — 84132 ASSAY OF SERUM POTASSIUM: CPT | Mod: ORL | Performed by: FAMILY MEDICINE

## 2024-07-18 PROCEDURE — 85027 COMPLETE CBC AUTOMATED: CPT | Mod: ORL | Performed by: FAMILY MEDICINE

## (undated) DEVICE — Device

## (undated) DEVICE — DECANTER VIAL 2006S

## (undated) DEVICE — PREP CHLORAPREP 26ML TINTED ORANGE  260815

## (undated) DEVICE — SU VICRYL 1 CT-1 27" J341H

## (undated) DEVICE — GLOVE PROTEXIS POWDER FREE 7.0 ORTHOPEDIC 2D73ET70

## (undated) DEVICE — LINEN FULL SHEET 5511

## (undated) DEVICE — SOL NACL 0.9% IRRIG 1000ML BOTTLE 2F7124

## (undated) DEVICE — LINEN HALF SHEET 5512

## (undated) DEVICE — SU VICRYL 0 CT-1 27" J340H

## (undated) DEVICE — NDL 22GA 1.5"

## (undated) DEVICE — SYR 10ML FINGER CONTROL W/O NDL 309695

## (undated) DEVICE — PACK HIP NAILING SOP32HPFC4

## (undated) DEVICE — DRILL BIT STRK 4.2X103MM FULL THRD 1806-4280S

## (undated) DEVICE — DRILL SRG 360MM LCK 4.2MM STRL

## (undated) DEVICE — LINEN TOWEL PACK X5 5464

## (undated) DEVICE — GLOVE PROTEXIS BLUE W/NEU-THERA 7.0  2D73EB70

## (undated) DEVICE — LINEN ORTHO ACL PACK 5447

## (undated) DEVICE — DRAPE X-RAY TUBE 00-901169-01-OEC

## (undated) DEVICE — GLOVE PROTEXIS W/NEU-THERA 7.5  2D73TE75

## (undated) DEVICE — DRSG TEGADERM 4X10" 1627

## (undated) DEVICE — GLOVE BIOGEL PI MICRO SZ 7.5 48575

## (undated) DEVICE — SU MONOCRYL 3-0 PS-1 27" Y936H

## (undated) DEVICE — GLOVE PROTEXIS POWDER FREE 6.0 ORTHOPEDIC 2D73ET60

## (undated) DEVICE — DRSG STERI STRIP 1/2X4" R1547

## (undated) DEVICE — DRAPE LAP W/ARMBOARD 29410

## (undated) DEVICE — SU VICRYL 2-0 CT-2 27" UND J269H

## (undated) DEVICE — GLOVE BIOGEL PI MICRO INDICATOR UNDERGLOVE SZ 6.5 48965

## (undated) DEVICE — BAG CLEAR TRASH 1.3M 39X33" P4040C

## (undated) DEVICE — GLOVE BIOGEL PI MICRO SZ 6.5 48565

## (undated) DEVICE — DRSG XEROFORM 5X9" 8884431605

## (undated) DEVICE — STPL SKIN 35W 6.9MM  PXW35

## (undated) DEVICE — DRAPE IOBAN INCISE 23X17" 6650EZ

## (undated) DEVICE — PACK MINOR SBA15MIFSE

## (undated) DEVICE — SU VICRYL 3-0 SH 27" J316H

## (undated) DEVICE — DRAPE C-ARMOR 5 SIDED 5523

## (undated) DEVICE — DRSG TEGADERM 4X4 3/4" 1626W

## (undated) DEVICE — SOL WATER IRRIG 1000ML BOTTLE 2F7114

## (undated) DEVICE — SU VICRYL 2-0 CT-1 36" UND J945H

## (undated) DEVICE — DRAPE SPLIT SHEET 77X108 REINFORCED 29436

## (undated) DEVICE — PREP CHLORAPREP 26ML TINTED HI-LITE ORANGE 930815

## (undated) DEVICE — GLOVE PROTEXIS BLUE W/NEU-THERA 7.5  2D73EB75

## (undated) DEVICE — DRAPE CONVERTORS U-DRAPE 60X72" 8476

## (undated) DEVICE — ESU GROUND PAD ADULT W/CORD E7507

## (undated) DEVICE — DRAPE LEGGINGS 8421

## (undated) DEVICE — SU SILK 2-0 FS-1 18" 685G

## (undated) DEVICE — SU VICRYL 4-0 PS-2 18" UND J496H

## (undated) DEVICE — ADH SKIN CLOSURE PREMIERPRO EXOFIN 1.0ML 3470

## (undated) DEVICE — TONGUE DEPRESSOR STERILE 6023

## (undated) DEVICE — DRSG TEGADERM 4X4 3/4" 1626

## (undated) DEVICE — ESU PENCIL W/SMOKE EVAC CVPLP2000

## (undated) DEVICE — GOWN XLG DISP 9545

## (undated) DEVICE — GLOVE PROTEXIS BLUE W/NEU-THERA 6.0  2D73EB60

## (undated) DEVICE — SUCTION MANIFOLD NEPTUNE 2 SYS 4 PORT 0702-020-000

## (undated) DEVICE — GLOVE BIOGEL PI MICRO INDICATOR UNDERGLOVE SZ 7.5 48975

## (undated) DEVICE — CATH TRAY FOLEY SURESTEP 16FR DRAIN BAG STATOCK A899916

## (undated) DEVICE — SYR 05ML SLIP TIP W/O NDL 309647

## (undated) RX ORDER — CEFAZOLIN SODIUM/WATER 2 G/20 ML
SYRINGE (ML) INTRAVENOUS
Status: DISPENSED
Start: 2023-11-11

## (undated) RX ORDER — KETOROLAC TROMETHAMINE 15 MG/ML
INJECTION, SOLUTION INTRAMUSCULAR; INTRAVENOUS
Status: DISPENSED
Start: 2020-10-11

## (undated) RX ORDER — FENTANYL CITRATE 50 UG/ML
INJECTION, SOLUTION INTRAMUSCULAR; INTRAVENOUS
Status: DISPENSED
Start: 2020-10-11

## (undated) RX ORDER — FENTANYL CITRATE-0.9 % NACL/PF 10 MCG/ML
PLASTIC BAG, INJECTION (ML) INTRAVENOUS
Status: DISPENSED
Start: 2023-11-11

## (undated) RX ORDER — LIDOCAINE HYDROCHLORIDE 10 MG/ML
INJECTION, SOLUTION EPIDURAL; INFILTRATION; INTRACAUDAL; PERINEURAL
Status: DISPENSED
Start: 2020-10-11

## (undated) RX ORDER — GLYCOPYRROLATE 0.2 MG/ML
INJECTION INTRAMUSCULAR; INTRAVENOUS
Status: DISPENSED
Start: 2020-10-11

## (undated) RX ORDER — TRANEXAMIC ACID 10 MG/ML
INJECTION, SOLUTION INTRAVENOUS
Status: DISPENSED
Start: 2023-11-11

## (undated) RX ORDER — FENTANYL CITRATE-0.9 % NACL/PF 10 MCG/ML
PLASTIC BAG, INJECTION (ML) INTRAVENOUS
Status: DISPENSED
Start: 2020-10-11

## (undated) RX ORDER — PROPOFOL 10 MG/ML
INJECTION, EMULSION INTRAVENOUS
Status: DISPENSED
Start: 2020-10-11

## (undated) RX ORDER — DEXAMETHASONE SODIUM PHOSPHATE 4 MG/ML
INJECTION, SOLUTION INTRA-ARTICULAR; INTRALESIONAL; INTRAMUSCULAR; INTRAVENOUS; SOFT TISSUE
Status: DISPENSED
Start: 2020-10-11

## (undated) RX ORDER — CLINDAMYCIN PHOSPHATE 900 MG/50ML
INJECTION, SOLUTION INTRAVENOUS
Status: DISPENSED
Start: 2020-10-11

## (undated) RX ORDER — FENTANYL CITRATE 50 UG/ML
INJECTION, SOLUTION INTRAMUSCULAR; INTRAVENOUS
Status: DISPENSED
Start: 2023-11-11

## (undated) RX ORDER — BUPIVACAINE HYDROCHLORIDE AND EPINEPHRINE 5; 5 MG/ML; UG/ML
INJECTION, SOLUTION EPIDURAL; INTRACAUDAL; PERINEURAL
Status: DISPENSED
Start: 2020-10-11

## (undated) RX ORDER — ONDANSETRON 2 MG/ML
INJECTION INTRAMUSCULAR; INTRAVENOUS
Status: DISPENSED
Start: 2020-10-11

## (undated) RX ORDER — METHADONE HYDROCHLORIDE 10 MG/ML
INJECTION, SOLUTION INTRAMUSCULAR; INTRAVENOUS; SUBCUTANEOUS
Status: DISPENSED
Start: 2023-11-11

## (undated) RX ORDER — ACETAMINOPHEN 325 MG/1
TABLET ORAL
Status: DISPENSED
Start: 2020-10-11

## (undated) RX ORDER — LABETALOL 20 MG/4 ML (5 MG/ML) INTRAVENOUS SYRINGE
Status: DISPENSED
Start: 2020-10-11